# Patient Record
Sex: MALE | Race: ASIAN | NOT HISPANIC OR LATINO | URBAN - METROPOLITAN AREA
[De-identification: names, ages, dates, MRNs, and addresses within clinical notes are randomized per-mention and may not be internally consistent; named-entity substitution may affect disease eponyms.]

---

## 2019-08-16 ENCOUNTER — INPATIENT (INPATIENT)
Facility: HOSPITAL | Age: 84
LOS: 10 days | Discharge: HOME | DRG: 291 | End: 2019-08-27
Attending: INTERNAL MEDICINE | Admitting: INTERNAL MEDICINE
Payer: MEDICAID

## 2019-08-16 VITALS
OXYGEN SATURATION: 96 % | RESPIRATION RATE: 20 BRPM | SYSTOLIC BLOOD PRESSURE: 145 MMHG | DIASTOLIC BLOOD PRESSURE: 76 MMHG | HEART RATE: 90 BPM | TEMPERATURE: 98 F

## 2019-08-16 DIAGNOSIS — Z95.0 PRESENCE OF CARDIAC PACEMAKER: Chronic | ICD-10-CM

## 2019-08-16 LAB
ALBUMIN SERPL ELPH-MCNC: 3.5 G/DL — SIGNIFICANT CHANGE UP (ref 3.3–5)
ALP SERPL-CCNC: 65 U/L — SIGNIFICANT CHANGE UP (ref 40–120)
ALT FLD-CCNC: 11 U/L — SIGNIFICANT CHANGE UP (ref 10–45)
ANION GAP SERPL CALC-SCNC: 11 MMOL/L — SIGNIFICANT CHANGE UP (ref 5–17)
APTT BLD: 30.5 SEC — SIGNIFICANT CHANGE UP (ref 27.5–36.3)
AST SERPL-CCNC: 21 U/L — SIGNIFICANT CHANGE UP (ref 10–40)
BASOPHILS # BLD AUTO: 0 K/UL — SIGNIFICANT CHANGE UP (ref 0–0.2)
BASOPHILS NFR BLD AUTO: 0.6 % — SIGNIFICANT CHANGE UP (ref 0–2)
BILIRUB SERPL-MCNC: 0.3 MG/DL — SIGNIFICANT CHANGE UP (ref 0.2–1.2)
BUN SERPL-MCNC: 26 MG/DL — HIGH (ref 7–23)
CALCIUM SERPL-MCNC: 8.7 MG/DL — SIGNIFICANT CHANGE UP (ref 8.4–10.5)
CHLORIDE SERPL-SCNC: 108 MMOL/L — SIGNIFICANT CHANGE UP (ref 96–108)
CO2 SERPL-SCNC: 22 MMOL/L — SIGNIFICANT CHANGE UP (ref 22–31)
CREAT SERPL-MCNC: 1.33 MG/DL — HIGH (ref 0.5–1.3)
EOSINOPHIL # BLD AUTO: 0.2 K/UL — SIGNIFICANT CHANGE UP (ref 0–0.5)
EOSINOPHIL NFR BLD AUTO: 2.3 % — SIGNIFICANT CHANGE UP (ref 0–6)
GLUCOSE SERPL-MCNC: 120 MG/DL — HIGH (ref 70–99)
HCT VFR BLD CALC: 34.4 % — LOW (ref 39–50)
HGB BLD-MCNC: 11.2 G/DL — LOW (ref 13–17)
INR BLD: 1 RATIO — SIGNIFICANT CHANGE UP (ref 0.88–1.16)
LYMPHOCYTES # BLD AUTO: 1.6 K/UL — SIGNIFICANT CHANGE UP (ref 1–3.3)
LYMPHOCYTES # BLD AUTO: 24.7 % — SIGNIFICANT CHANGE UP (ref 13–44)
MCHC RBC-ENTMCNC: 30.5 PG — SIGNIFICANT CHANGE UP (ref 27–34)
MCHC RBC-ENTMCNC: 32.5 GM/DL — SIGNIFICANT CHANGE UP (ref 32–36)
MCV RBC AUTO: 93.8 FL — SIGNIFICANT CHANGE UP (ref 80–100)
MONOCYTES # BLD AUTO: 0.6 K/UL — SIGNIFICANT CHANGE UP (ref 0–0.9)
MONOCYTES NFR BLD AUTO: 9.8 % — SIGNIFICANT CHANGE UP (ref 2–14)
NEUTROPHILS # BLD AUTO: 4.2 K/UL — SIGNIFICANT CHANGE UP (ref 1.8–7.4)
NEUTROPHILS NFR BLD AUTO: 62.6 % — SIGNIFICANT CHANGE UP (ref 43–77)
NT-PROBNP SERPL-SCNC: HIGH PG/ML (ref 0–300)
PLATELET # BLD AUTO: 228 K/UL — SIGNIFICANT CHANGE UP (ref 150–400)
POTASSIUM SERPL-MCNC: 4.3 MMOL/L — SIGNIFICANT CHANGE UP (ref 3.5–5.3)
POTASSIUM SERPL-SCNC: 4.3 MMOL/L — SIGNIFICANT CHANGE UP (ref 3.5–5.3)
PROT SERPL-MCNC: 6.7 G/DL — SIGNIFICANT CHANGE UP (ref 6–8.3)
PROTHROM AB SERPL-ACNC: 11.5 SEC — SIGNIFICANT CHANGE UP (ref 10–12.9)
RBC # BLD: 3.67 M/UL — LOW (ref 4.2–5.8)
RBC # FLD: 13.1 % — SIGNIFICANT CHANGE UP (ref 10.3–14.5)
SODIUM SERPL-SCNC: 141 MMOL/L — SIGNIFICANT CHANGE UP (ref 135–145)
WBC # BLD: 6.6 K/UL — SIGNIFICANT CHANGE UP (ref 3.8–10.5)
WBC # FLD AUTO: 6.6 K/UL — SIGNIFICANT CHANGE UP (ref 3.8–10.5)

## 2019-08-16 PROCEDURE — 93010 ELECTROCARDIOGRAM REPORT: CPT

## 2019-08-16 PROCEDURE — 99285 EMERGENCY DEPT VISIT HI MDM: CPT

## 2019-08-16 RX ORDER — FUROSEMIDE 40 MG
20 TABLET ORAL ONCE
Refills: 0 | Status: COMPLETED | OUTPATIENT
Start: 2019-08-16 | End: 2019-08-16

## 2019-08-16 RX ADMIN — Medication 20 MILLIGRAM(S): at 23:49

## 2019-08-16 NOTE — ED ADULT TRIAGE NOTE - NS ED NOTE AC HIGH RISK COUNTRIES
Problem: ACTIVITY INTOLERANCE/IMPAIRED MOBILITY  Goal: Mobility/activity is maintained at optimum level for patient  4/8/2019 1101 by Rosa Linder RN  Outcome: Met This Shift     Problem: Falls - Risk of:  Goal: Will remain free from falls  Description  Will remain free from falls  4/8/2019 1101 by Rosa Linder RN  Outcome: Met This Shift No

## 2019-08-16 NOTE — ED ADULT NURSE NOTE - OBJECTIVE STATEMENT
patient came in with son complaining of sob especially on exertion x 2 weeks. With occassional cough felt like some mucus but not bringing up anything. Denies chest discomfort,nausea,vomiting, difficulty urinating or fever. (+) chronic BLE swelling. Also added feeling bloated & no bm for the last 2 days. Hx pacemaker placed on 2015 in [place].

## 2019-08-16 NOTE — ED ADULT NURSE NOTE - PAIN: BODY LOCATION
----- Message from Eric Prather MA sent at 6/13/2017  9:03 AM CDT -----  Contact: self - [phone]  Requesting to speak with someone this morning regarding his EKG results. Please call. Thanks!    abdomen

## 2019-08-17 DIAGNOSIS — I50.9 HEART FAILURE, UNSPECIFIED: ICD-10-CM

## 2019-08-17 DIAGNOSIS — I10 ESSENTIAL (PRIMARY) HYPERTENSION: ICD-10-CM

## 2019-08-17 DIAGNOSIS — N17.9 ACUTE KIDNEY FAILURE, UNSPECIFIED: ICD-10-CM

## 2019-08-17 DIAGNOSIS — Z85.46 PERSONAL HISTORY OF MALIGNANT NEOPLASM OF PROSTATE: ICD-10-CM

## 2019-08-17 DIAGNOSIS — Z29.9 ENCOUNTER FOR PROPHYLACTIC MEASURES, UNSPECIFIED: ICD-10-CM

## 2019-08-17 DIAGNOSIS — R09.89 OTHER SPECIFIED SYMPTOMS AND SIGNS INVOLVING THE CIRCULATORY AND RESPIRATORY SYSTEMS: ICD-10-CM

## 2019-08-17 LAB
ANION GAP SERPL CALC-SCNC: 13 MMOL/L — SIGNIFICANT CHANGE UP (ref 5–17)
APPEARANCE UR: CLEAR — SIGNIFICANT CHANGE UP
BILIRUB UR-MCNC: NEGATIVE — SIGNIFICANT CHANGE UP
BUN SERPL-MCNC: 24 MG/DL — HIGH (ref 7–23)
CALCIUM SERPL-MCNC: 8.8 MG/DL — SIGNIFICANT CHANGE UP (ref 8.4–10.5)
CHLORIDE SERPL-SCNC: 109 MMOL/L — HIGH (ref 96–108)
CO2 SERPL-SCNC: 21 MMOL/L — LOW (ref 22–31)
COLOR SPEC: SIGNIFICANT CHANGE UP
CREAT SERPL-MCNC: 1.27 MG/DL — SIGNIFICANT CHANGE UP (ref 0.5–1.3)
DIFF PNL FLD: SIGNIFICANT CHANGE UP
FERRITIN SERPL-MCNC: 99 NG/ML — SIGNIFICANT CHANGE UP (ref 30–400)
GLUCOSE SERPL-MCNC: 116 MG/DL — HIGH (ref 70–99)
GLUCOSE UR QL: NEGATIVE — SIGNIFICANT CHANGE UP
HCT VFR BLD CALC: 37 % — LOW (ref 39–50)
HGB BLD-MCNC: 11.7 G/DL — LOW (ref 13–17)
IRON SATN MFR SERPL: 15 % — LOW (ref 16–55)
IRON SATN MFR SERPL: 40 UG/DL — LOW (ref 45–165)
KETONES UR-MCNC: NEGATIVE — SIGNIFICANT CHANGE UP
LEUKOCYTE ESTERASE UR-ACNC: NEGATIVE — SIGNIFICANT CHANGE UP
MAGNESIUM SERPL-MCNC: 2.1 MG/DL — SIGNIFICANT CHANGE UP (ref 1.6–2.6)
MCHC RBC-ENTMCNC: 30.1 PG — SIGNIFICANT CHANGE UP (ref 27–34)
MCHC RBC-ENTMCNC: 31.6 GM/DL — LOW (ref 32–36)
MCV RBC AUTO: 95.1 FL — SIGNIFICANT CHANGE UP (ref 80–100)
NITRITE UR-MCNC: NEGATIVE — SIGNIFICANT CHANGE UP
PH UR: 6 — SIGNIFICANT CHANGE UP (ref 5–8)
PHOSPHATE SERPL-MCNC: 3.7 MG/DL — SIGNIFICANT CHANGE UP (ref 2.5–4.5)
PLATELET # BLD AUTO: 239 K/UL — SIGNIFICANT CHANGE UP (ref 150–400)
POTASSIUM SERPL-MCNC: 3.8 MMOL/L — SIGNIFICANT CHANGE UP (ref 3.5–5.3)
POTASSIUM SERPL-SCNC: 3.8 MMOL/L — SIGNIFICANT CHANGE UP (ref 3.5–5.3)
PROT UR-MCNC: ABNORMAL
RBC # BLD: 3.89 M/UL — LOW (ref 4.2–5.8)
RBC # BLD: 3.98 M/UL — LOW (ref 4.2–5.8)
RBC # FLD: 14 % — SIGNIFICANT CHANGE UP (ref 10.3–14.5)
RETICS #: 95.1 K/UL — SIGNIFICANT CHANGE UP (ref 25–125)
RETICS/RBC NFR: 2.4 % — SIGNIFICANT CHANGE UP (ref 0.5–2.5)
SODIUM SERPL-SCNC: 143 MMOL/L — SIGNIFICANT CHANGE UP (ref 135–145)
SP GR SPEC: 1.01 — SIGNIFICANT CHANGE UP (ref 1.01–1.02)
TIBC SERPL-MCNC: 274 UG/DL — SIGNIFICANT CHANGE UP (ref 220–430)
TROPONIN T, HIGH SENSITIVITY RESULT: 87 NG/L — HIGH (ref 0–51)
TROPONIN T, HIGH SENSITIVITY RESULT: 88 NG/L — HIGH (ref 0–51)
UIBC SERPL-MCNC: 234 UG/DL — SIGNIFICANT CHANGE UP (ref 110–370)
UROBILINOGEN FLD QL: SIGNIFICANT CHANGE UP
WBC # BLD: 7.1 K/UL — SIGNIFICANT CHANGE UP (ref 3.8–10.5)
WBC # FLD AUTO: 7.1 K/UL — SIGNIFICANT CHANGE UP (ref 3.8–10.5)

## 2019-08-17 PROCEDURE — 71046 X-RAY EXAM CHEST 2 VIEWS: CPT | Mod: 26

## 2019-08-17 PROCEDURE — 99223 1ST HOSP IP/OBS HIGH 75: CPT

## 2019-08-17 RX ORDER — FUROSEMIDE 40 MG
40 TABLET ORAL EVERY 8 HOURS
Refills: 0 | Status: DISCONTINUED | OUTPATIENT
Start: 2019-08-17 | End: 2019-08-18

## 2019-08-17 RX ADMIN — Medication 40 MILLIGRAM(S): at 21:38

## 2019-08-17 RX ADMIN — Medication 40 MILLIGRAM(S): at 07:58

## 2019-08-17 RX ADMIN — Medication 200 MILLIGRAM(S): at 23:17

## 2019-08-17 RX ADMIN — Medication 40 MILLIGRAM(S): at 13:00

## 2019-08-17 NOTE — PROVIDER CONTACT NOTE (CRITICAL VALUE NOTIFICATION) - BACKGROUND
89 y/o M with PMH CHF, HTN presents to the ED with c/o SOB and productive cough with white sputum onset 4 day ago

## 2019-08-17 NOTE — H&P ADULT - PROBLEM SELECTOR PLAN 4
Patient states that he had previously been treated and had downtrending PSA.  Patient should have regular follow up with his urologist upon discharge from the hospital.

## 2019-08-17 NOTE — H&P ADULT - NSHPLABSRESULTS_GEN_ALL_CORE
Labs, imaging and EKG personally reviewed by me.     LABS:                        11.2   6.6   )-----------( 228      ( 16 Aug 2019 23:07 )             34.4     Hgb Trend: 11.2<--  08-16    141  |  108  |  26<H>  ----------------------------<  120<H>  4.3   |  22  |  1.33<H>    Ca    8.7      16 Aug 2019 23:07    TPro  6.7  /  Alb  3.5  /  TBili  0.3  /  DBili  x   /  AST  21  /  ALT  11  /  AlkPhos  65  08-16    Creatinine Trend: 1.33<--  PT/INR - ( 16 Aug 2019 23:07 )   PT: 11.5 sec;   INR: 1.00 ratio         PTT - ( 16 Aug 2019 23:07 )  PTT:30.5 sec Labs, imaging and EKG personally reviewed by me.     EKG a sensed, v paced HR 70s    LABS:                        11.2   6.6   )-----------( 228      ( 16 Aug 2019 23:07 )             34.4     Hgb Trend: 11.2<--  08-16    141  |  108  |  26<H>  ----------------------------<  120<H>  4.3   |  22  |  1.33<H>    Ca    8.7      16 Aug 2019 23:07    TPro  6.7  /  Alb  3.5  /  TBili  0.3  /  DBili  x   /  AST  21  /  ALT  11  /  AlkPhos  65  08-16    Creatinine Trend: 1.33<--  PT/INR - ( 16 Aug 2019 23:07 )   PT: 11.5 sec;   INR: 1.00 ratio         PTT - ( 16 Aug 2019 23:07 )  PTT:30.5 sec    < from: Xray Chest 2 Views PA/Lat (08.17.19 @ 00:35) >      PROCEDURE DATE:  08/17/2019      ******PRELIMINARY REPORT******    ******PRELIMINARY REPORT******          INTERPRETATION:  Cardiomegaly with pulmonary edema and bilateral pleural   effusions.      ******PRELIMINARY REPORT******      < end of copied text >

## 2019-08-17 NOTE — H&P ADULT - HISTORY OF PRESENT ILLNESS
This patient is a 89yoM with PMH of htn, prostate cancer s/p treatment, ppm (indication unknown) who presents to the ED with complaint of dyspnea. He endorses having worsening dyspnea with exertion over the last 2 weeks, and lower extremity edema progressing to the knees bilaterally. He also complains of persistent dry cough. He denies any fever, chills, dizziness, CP or palpitations. The patient is hard of hearing.      In the ED, T 97.6F, HR 90, /76, RR 20, SpO2 97.6F  Patient was given lasix 20mg IVP. This patient is a 89yoM with PMH of htn, prostate cancer s/p treatment, ppm (indication unknown) who presents to the ED with complaint of dyspnea. He endorses having worsening dyspnea with exertion over the last 2 weeks, and lower extremity edema progressing to the knees bilaterally. He also complains of persistent dry cough. He denies any fever, chills, dizziness, CP or palpitations. He denies orthopnea. He has no history of MI, renal or hepatic dysfunction.   He is visiting from [place] and does not have a physician in the [place].   The patient is hard of hearing but reads English well.       In the ED, T 97.6F, HR 90, /76, RR 20, SpO2 97.6F  Patient was given lasix 20mg IVP.

## 2019-08-17 NOTE — H&P ADULT - NSHPPHYSICALEXAM_GEN_ALL_CORE
Vital Signs Last 24 Hrs  T(C): 36.3 (17 Aug 2019 02:18), Max: 36.6 (16 Aug 2019 22:39)  T(F): 97.4 (17 Aug 2019 02:18), Max: 97.9 (16 Aug 2019 22:39)  HR: 90 (17 Aug 2019 02:18) (71 - 90)  BP: 153/81 (17 Aug 2019 02:18) (137/89 - 158/90)  BP(mean): --  RR: 18 (17 Aug 2019 02:18) (18 - 20)  SpO2: 94% (17 Aug 2019 02:18) (94% - 96%)    PHYSICAL EXAM:  GENERAL: NAD, well-groomed, well-developed  HEAD:  Atraumatic, Normocephalic  EYES: EOMI, PERRLA, conjunctiva and sclera clear  ENMT: No oropharyngeal exudates, erythema or lesions,  Moist mucous membranes, hard of hearing  NECK: Supple, no cervical lymphadenopathy, no visible JVD  NERVOUS SYSTEM:  Alert & Oriented X3, 5/5 BUE and BLE motor strength, full sensation to light touch   CHEST/LUNG: bilateral crackles in all lung fields, no rhonchi  HEART: Regular rate and rhythm; No murmurs, rubs, or gallops  ppm visible at L chest wall  ABDOMEN: Soft, Nontender, Nondistended  EXTREMITIES:  2+ radial pulses, No clubbing, no cyanosis, 2+ pitting edema to knees bilaterally  MSK: lower back tenderness to palpation  SKIN: No rashes or lesions

## 2019-08-17 NOTE — PATIENT PROFILE ADULT - PSYCHOSOCIAL CONCERNS
----- Message from Kofi Renee MD sent at 3/31/2017  1:15 PM CDT -----  Amoxicillin 500 mg t.i.d. for 14 days, start 10 days prior to procedure  
Alison notified of ua results, RX will be started.  
none

## 2019-08-17 NOTE — H&P ADULT - PROBLEM SELECTOR PLAN 1
Patient has TRUONG, crackles bilaterally, with CXR of pulmonary edema, pleural effusions and elevated proBNP indicative of heart failure exacerbation.   Monitor on telemetry  Check transthoracic echocardiogram.  Start lasix 40mg IVP q8h. Monitor strict I&Os and daily weights. Check BMP and replete electrolytes q12.  Monitor renal function.   Obtain additional documents regarding patient's cardiac history. It is unclear if heart failure is chronic or acute. Patient denies history of MI. Patient may require inpatient ischemic workup if this had not been performed previously.  Repeat troponin x1. Patient has TRUONG, crackles bilaterally, with CXR of pulmonary edema, pleural effusions and elevated proBNP indicative of heart failure exacerbation.   Monitor on telemetry  Check transthoracic echocardiogram.  Start lasix 40mg IVP q8h. Monitor strict I&Os and daily weights. Check BMP and replete electrolytes q12.  Monitor renal function.   Obtain additional documents regarding patient's cardiac history. It is unclear if heart failure is chronic or acute. Patient denies history of MI. Patient may require inpatient ischemic workup if this had not been performed previously.  Repeat troponin x1.   Follow up final CXR read Patient has TRUONG, crackles bilaterally, with CXR of pulmonary edema, pleural effusions and elevated proBNP indicative of heart failure exacerbation.   Monitor on telemetry  Check transthoracic echocardiogram.  Start lasix 40mg IVP q8h. Monitor strict I&Os and daily weights. Check BMP and replete electrolytes q12.  Monitor renal function.   Obtain additional documents regarding patient's cardiac history. It is unclear if heart failure is chronic or acute. Patient denies history of MI. Patient may require inpatient ischemic workup if this had not been performed previously.  Check troponin    Follow up final CXR read

## 2019-08-17 NOTE — H&P ADULT - ASSESSMENT
This patient is a 89yoM with PMH of htn, prostate cancer s/p treatment, ppm (indication unknown) who presents to the ED with complaint of dyspnea. He endorses having worsening dyspnea with exertion over the last 2 weeks, and lower extremity edema progressing to the knees bilaterally. He also complains of persistent dry cough. He denies any fever, chills, dizziness, CP or palpitations. The patient is hard of hearing.      In the ED, T 97.6F, HR 90, /76, RR 20, SpO2 97.6F  Patient was given lasix 20mg IVP. This patient is a 89yoM with PMH of htn, prostate cancer s/p treatment, ppm (indication unknown) who presents to the ED with complaint of dyspnea and BLE edema 2/2 suspected heart failure exacerbation

## 2019-08-17 NOTE — ED PROVIDER NOTE - CLINICAL SUMMARY MEDICAL DECISION MAKING FREE TEXT BOX
89 y/o M with PMH HTN and CHF presents to the ED with c/o SOB, orthopnea and increased b/l leg swelling for the past 4 days. Also c/o cough. No fevers. Labs, BNP, CXR. 89 y/o M with PMH HTN and CHF presents to the ED with c/o SOB, orthopnea and increased b/l leg swelling for the past 4 days. Also c/o cough. No fevers. Labs, BNP, CXR, lasix. Patient 89 y/o M with PMH HTN and CHF presents to the ED with c/o SOB, orthopnea and increased b/l leg swelling for the past 4 days. Also c/o cough. No fevers. DDx include CHF exacerbation (most likely), pneumonia, PNA UTI. Labs, BNP, CXR, lasix.

## 2019-08-17 NOTE — ED PROVIDER NOTE - PHYSICAL EXAMINATION
Gen: WDWN, NAD  HEENT: EOMI, no nasal discharge, mucous membranes moist  CV: RRR, +S1/S2, no M/R/G  Resp: CTAB, +crackles in LLL, no wheezing  GI: Abdomen soft non-distended, NTTP, no masses  MSK: No open wounds, no bruising, 2+pitting edema to bilateral LEs to upper 1/3 shin  Neuro: A&Ox4, following commands, moving all four extremities spontaneously  Psych: appropriate mood, denies AH, VH, SI

## 2019-08-17 NOTE — H&P ADULT - NSHPREVIEWOFSYSTEMS_GEN_ALL_CORE
REVIEW OF SYSTEMS  CONSTITUTIONAL: No fever, no chills, no fatigue  EYES: No eye pain, no vision changes  ENMT:  No difficulty hearing, no throat pain  RESPIRATORY: + cough, no wheezing, no sputum production; + shortness of breath  CARDIOVASCULAR: No chest pain, no palpitations, + TRUONG, + leg swelling  GASTROINTESTINAL: No abdominal pain, no nausea, no vomiting, no diarrhea, no constipation  GENITOURINARY: No dysuria, no hematuria  NEUROLOGICAL: No headaches, no loss of strength, no numbness  SKIN: No itching, no rashes, no lesions   MUSCULOSKELETAL: No joint pain, no joint swelling; No muscle pain  HEME/LYMPH: No easy bruising, bleeding

## 2019-08-17 NOTE — H&P ADULT - PROBLEM SELECTOR PLAN 2
Patient noted to have elevated SCr, potentially  related to renal vascular congestion.  Continue diuresis as noted above.  Monitor SCr. Patient noted to have elevated SCr, potentially  related to renal vascular congestion.  Continue diuresis as noted above.  Monitor SCr.  When renal function normalizes, pt would  benefit from being started on an ACEI.

## 2019-08-17 NOTE — H&P ADULT - PROBLEM SELECTOR PLAN 3
The patient's BP is currently elevated.  Will continue lasix for diuresis.  Patient is on s-numlo at home, which appears to be amlodipine. Will hold for now.

## 2019-08-17 NOTE — ED PROVIDER NOTE - OBJECTIVE STATEMENT
89 y/o M with PMH CHF, HTN presents to the ED with c/o SOB and productive cough with white sputum onset 4 day ago. Patient states SOB worse with walking and laying flat. No fevers. Also notes 2 days constipation and some abdominal distension. Denies CP, palpitations, n/v/d. Patient took lasix 8 months ago 20mg, but has not had any in 8 months.

## 2019-08-18 LAB
ANION GAP SERPL CALC-SCNC: 12 MMOL/L — SIGNIFICANT CHANGE UP (ref 5–17)
BUN SERPL-MCNC: 24 MG/DL — HIGH (ref 7–23)
CALCIUM SERPL-MCNC: 8.7 MG/DL — SIGNIFICANT CHANGE UP (ref 8.4–10.5)
CHLORIDE SERPL-SCNC: 104 MMOL/L — SIGNIFICANT CHANGE UP (ref 96–108)
CO2 SERPL-SCNC: 26 MMOL/L — SIGNIFICANT CHANGE UP (ref 22–31)
CREAT SERPL-MCNC: 1.34 MG/DL — HIGH (ref 0.5–1.3)
GLUCOSE SERPL-MCNC: 104 MG/DL — HIGH (ref 70–99)
POTASSIUM SERPL-MCNC: 3.8 MMOL/L — SIGNIFICANT CHANGE UP (ref 3.5–5.3)
POTASSIUM SERPL-SCNC: 3.8 MMOL/L — SIGNIFICANT CHANGE UP (ref 3.5–5.3)
SODIUM SERPL-SCNC: 142 MMOL/L — SIGNIFICANT CHANGE UP (ref 135–145)

## 2019-08-18 RX ORDER — LANOLIN ALCOHOL/MO/W.PET/CERES
3 CREAM (GRAM) TOPICAL AT BEDTIME
Refills: 0 | Status: DISCONTINUED | OUTPATIENT
Start: 2019-08-18 | End: 2019-08-27

## 2019-08-18 RX ORDER — FUROSEMIDE 40 MG
40 TABLET ORAL
Refills: 0 | Status: DISCONTINUED | OUTPATIENT
Start: 2019-08-18 | End: 2019-08-22

## 2019-08-18 RX ADMIN — Medication 3 MILLIGRAM(S): at 21:49

## 2019-08-18 RX ADMIN — Medication 40 MILLIGRAM(S): at 05:17

## 2019-08-18 RX ADMIN — Medication 40 MILLIGRAM(S): at 17:00

## 2019-08-18 NOTE — PROVIDER CONTACT NOTE (MEDICATION) - ASSESSMENT
Pt A&OX3, forgetful at times.  Pt has a infrequent, nonproductive cough preventing him to fall sleep.

## 2019-08-18 NOTE — PROGRESS NOTE ADULT - SUBJECTIVE AND OBJECTIVE BOX
Patient is a 89y old  Male who presents with a chief complaint of dyspnea (17 Aug 2019 04:41)      SUBJECTIVE / OVERNIGHT EVENTS:    Events noted.  CONSTITUTIONAL: Weakness  No N/V/Abd pain  On supp O2    MEDICATIONS  (STANDING):  furosemide   Injectable 40 milliGRAM(s) IV Push two times a day    MEDICATIONS  (PRN):  melatonin 3 milliGRAM(s) Oral at bedtime PRN Insomnia        CAPILLARY BLOOD GLUCOSE        I&O's Summary    17 Aug 2019 07:  -  18 Aug 2019 07:00  --------------------------------------------------------  IN: 1205 mL / OUT: 675 mL / NET: 530 mL    18 Aug 2019 07:01  -  18 Aug 2019 22:50  --------------------------------------------------------  IN: 1200 mL / OUT: 1425 mL / NET: -225 mL        PHYSICAL EXAM:    NECK: Supple, No JVD  CHEST/LUNG: Clear to auscultation bilaterally; Bl basilar crackles  HEART: Regular rate and rhythm; No murmurs, rubs, or gallops  ABDOMEN: Soft, Nontender, Nondistended; Bowel sounds present  EXTREMITIES:   Pedal edema  NEUROLOGY: AAO       LABS:                        11.7   7.10  )-----------( 239      ( 17 Aug 2019 12:53 )             37.0         142  |  104  |  24<H>  ----------------------------<  104<H>  3.8   |  26  |  1.34<H>    Ca    8.7      18 Aug 2019 05:51  Phos  3.7       Mg     2.1         TPro  6.7  /  Alb  3.5  /  TBili  0.3  /  DBili  x   /  AST  21  /  ALT  11  /  AlkPhos  65  08-16    PT/INR - ( 16 Aug 2019 23:07 )   PT: 11.5 sec;   INR: 1.00 ratio         PTT - ( 16 Aug 2019 23:07 )  PTT:30.5 sec      Urinalysis Basic - ( 17 Aug 2019 12:12 )    Color: Light Yellow / Appearance: Clear / S.010 / pH: x  Gluc: x / Ketone: Negative  / Bili: Negative / Urobili: <2 mg/dL   Blood: x / Protein: 100 mg/dL / Nitrite: Negative   Leuk Esterase: Negative / RBC: 2 /HPF / WBC 1 /HPF   Sq Epi: x / Non Sq Epi: 0 /HPF / Bacteria: Negative      CAPILLARY BLOOD GLUCOSE                    RADIOLOGY & ADDITIONAL TESTS:    Imaging Personally Reviewed:    Consultant(s) Notes Reviewed:      Care Discussed with Consultants/Other Providers:

## 2019-08-18 NOTE — PROVIDER CONTACT NOTE (MEDICATION) - SITUATION
Pt complaining about anxiety and difficulty sleeping, says he takes daxid 25 mg at home for anxiety and lexapro.

## 2019-08-18 NOTE — PROVIDER CONTACT NOTE (MEDICATION) - ASSESSMENT
Pt A&OX4, V/S stable see flowsheet.  Pt complaining of anxiety and difficulty sleeping and requesting his home meds for anxiety.

## 2019-08-18 NOTE — PROVIDER CONTACT NOTE (MEDICATION) - ACTION/TREATMENT ORDERED:
WALLY Robb ordered melatonin prn for sleep, unable to prescribe med because of neg side effect on heart. Will continue to monitor.  Pt will bring home meds for anxiety tmr to discuss with AM team.

## 2019-08-18 NOTE — PROVIDER CONTACT NOTE (MEDICATION) - BACKGROUND
Pt admitted for CHF exacerbation on Lasix IV and LUZ.  PMH of HTN, BPH, prostate cancer, LUZ, HF and history of pacemaker.

## 2019-08-19 LAB
ANION GAP SERPL CALC-SCNC: 11 MMOL/L — SIGNIFICANT CHANGE UP (ref 5–17)
BUN SERPL-MCNC: 21 MG/DL — SIGNIFICANT CHANGE UP (ref 7–23)
CALCIUM SERPL-MCNC: 9 MG/DL — SIGNIFICANT CHANGE UP (ref 8.4–10.5)
CHLORIDE SERPL-SCNC: 98 MMOL/L — SIGNIFICANT CHANGE UP (ref 96–108)
CO2 SERPL-SCNC: 28 MMOL/L — SIGNIFICANT CHANGE UP (ref 22–31)
CREAT SERPL-MCNC: 1.31 MG/DL — HIGH (ref 0.5–1.3)
GLUCOSE SERPL-MCNC: 105 MG/DL — HIGH (ref 70–99)
HCT VFR BLD CALC: 38.4 % — LOW (ref 39–50)
HGB BLD-MCNC: 12.3 G/DL — LOW (ref 13–17)
MCHC RBC-ENTMCNC: 29.7 PG — SIGNIFICANT CHANGE UP (ref 27–34)
MCHC RBC-ENTMCNC: 32 GM/DL — SIGNIFICANT CHANGE UP (ref 32–36)
MCV RBC AUTO: 92.8 FL — SIGNIFICANT CHANGE UP (ref 80–100)
PLATELET # BLD AUTO: 242 K/UL — SIGNIFICANT CHANGE UP (ref 150–400)
POTASSIUM SERPL-MCNC: 3.5 MMOL/L — SIGNIFICANT CHANGE UP (ref 3.5–5.3)
POTASSIUM SERPL-SCNC: 3.5 MMOL/L — SIGNIFICANT CHANGE UP (ref 3.5–5.3)
RBC # BLD: 4.14 M/UL — LOW (ref 4.2–5.8)
RBC # FLD: 13.7 % — SIGNIFICANT CHANGE UP (ref 10.3–14.5)
SODIUM SERPL-SCNC: 137 MMOL/L — SIGNIFICANT CHANGE UP (ref 135–145)
WBC # BLD: 6.24 K/UL — SIGNIFICANT CHANGE UP (ref 3.8–10.5)
WBC # FLD AUTO: 6.24 K/UL — SIGNIFICANT CHANGE UP (ref 3.8–10.5)

## 2019-08-19 PROCEDURE — 93306 TTE W/DOPPLER COMPLETE: CPT | Mod: 26

## 2019-08-19 RX ADMIN — Medication 40 MILLIGRAM(S): at 17:46

## 2019-08-19 RX ADMIN — Medication 40 MILLIGRAM(S): at 05:01

## 2019-08-20 LAB
ANION GAP SERPL CALC-SCNC: 10 MMOL/L — SIGNIFICANT CHANGE UP (ref 5–17)
BUN SERPL-MCNC: 18 MG/DL — SIGNIFICANT CHANGE UP (ref 7–23)
CALCIUM SERPL-MCNC: 8.7 MG/DL — SIGNIFICANT CHANGE UP (ref 8.4–10.5)
CHLORIDE SERPL-SCNC: 94 MMOL/L — LOW (ref 96–108)
CO2 SERPL-SCNC: 31 MMOL/L — SIGNIFICANT CHANGE UP (ref 22–31)
CREAT SERPL-MCNC: 1.14 MG/DL — SIGNIFICANT CHANGE UP (ref 0.5–1.3)
GLUCOSE SERPL-MCNC: 100 MG/DL — HIGH (ref 70–99)
HCT VFR BLD CALC: 40.4 % — SIGNIFICANT CHANGE UP (ref 39–50)
HGB BLD-MCNC: 13.3 G/DL — SIGNIFICANT CHANGE UP (ref 13–17)
MCHC RBC-ENTMCNC: 30.1 PG — SIGNIFICANT CHANGE UP (ref 27–34)
MCHC RBC-ENTMCNC: 32.9 GM/DL — SIGNIFICANT CHANGE UP (ref 32–36)
MCV RBC AUTO: 91.4 FL — SIGNIFICANT CHANGE UP (ref 80–100)
PLATELET # BLD AUTO: 258 K/UL — SIGNIFICANT CHANGE UP (ref 150–400)
POTASSIUM SERPL-MCNC: 3.8 MMOL/L — SIGNIFICANT CHANGE UP (ref 3.5–5.3)
POTASSIUM SERPL-SCNC: 3.8 MMOL/L — SIGNIFICANT CHANGE UP (ref 3.5–5.3)
RBC # BLD: 4.42 M/UL — SIGNIFICANT CHANGE UP (ref 4.2–5.8)
RBC # FLD: 13.6 % — SIGNIFICANT CHANGE UP (ref 10.3–14.5)
SODIUM SERPL-SCNC: 135 MMOL/L — SIGNIFICANT CHANGE UP (ref 135–145)
WBC # BLD: 6.73 K/UL — SIGNIFICANT CHANGE UP (ref 3.8–10.5)
WBC # FLD AUTO: 6.73 K/UL — SIGNIFICANT CHANGE UP (ref 3.8–10.5)

## 2019-08-20 PROCEDURE — 99255 IP/OBS CONSLTJ NEW/EST HI 80: CPT | Mod: GC

## 2019-08-20 RX ORDER — ACETAMINOPHEN 500 MG
650 TABLET ORAL ONCE
Refills: 0 | Status: COMPLETED | OUTPATIENT
Start: 2019-08-20 | End: 2019-08-20

## 2019-08-20 RX ADMIN — Medication 650 MILLIGRAM(S): at 12:30

## 2019-08-20 RX ADMIN — Medication 40 MILLIGRAM(S): at 17:41

## 2019-08-20 RX ADMIN — Medication 650 MILLIGRAM(S): at 11:31

## 2019-08-20 RX ADMIN — Medication 40 MILLIGRAM(S): at 06:05

## 2019-08-20 NOTE — SWALLOW BEDSIDE ASSESSMENT ADULT - SWALLOW EVAL: DIAGNOSIS
Pt presents with a grossly functional oropharyngeal swallow. Some confusion, abnormal behaviors noted initially during cup sips (pouring water into open oral cavity), however responded to min cues to take small single cup sips. Otherwise, adequate oral prep, transit and clearance across consistencies. No overt signs/symptoms of laryngeal penetration/aspiration noted across consistencies trialed.

## 2019-08-20 NOTE — CONSULT NOTE ADULT - ASSESSMENT
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 Cardiology consulted for management of pacemaker lead thrombus.     !!! INCOMPLETE NOTE PENDING DISCUSSION WITH ATTENDING !!!    #Pacemaker lead thrombus  - Low risk for significant pulmonary embolis  - Recommend starting warfarin for 3 months  - Repeat TTE at 6 weeks to evaluate for resolution/regression of thrombus    Mark Hellerman PGY2 89 yo man (visiting from [place], speaks [place]) w/ hx of htn, prostate cancer s/p treatment, who presented with 2 weeks of dyspnea associated with progressive LLE, admitted for HF exacerbation, TTE w/ concern for lead thrombus. Cardiology consulted for management of possible pacemaker lead thrombus.     #Pacemaker lead thrombus  - Etiology of lead finding unclear. Low concern for endocarditis give absence of fever and leukocytosis.   - Management of implanted cardiac lead thrombi remains undefined however some studies have found >80% thrombus resolution after 6 week trial of Warfarin (PMID [id number])  - Recommend 6 week trial of Warfarin (Goal INR 2-3) w/ repeat TTE after 6 weeks to evaluate for resolution of thrombus    #Heart failure  - Recommend continuing diuresis  - Recommend initiating guideline directed medical therapy starting with carvedilol ER 20mg daily     Mark Hellerman, PGY2 89 yo man (visiting from [place], speaks [place]) w/ hx of htn, prostate cancer s/p treatment, who presented with 2 weeks of dyspnea associated with progressive LLE, admitted for HF exacerbation, TTE w/ concern for lead thrombus. Cardiology consulted for management of possible pacemaker lead thrombus.     #Pacemaker lead thrombus  - Etiology of lead finding unclear. Low concern for endocarditis give absence of fever and leukocytosis.   - Management of implanted cardiac lead thrombi remains undefined however some studies have found >80% thrombus resolution after 6 week trial of Warfarin (PMID [id number])  - Recommend 6 week trial of Warfarin (Goal INR 2-3) w/ repeat TTE after 6 weeks to evaluate for resolution of thrombus    #Heart failure  - Recommend continuing diuresis  - Recommend initiating guideline directed medical therapy starting with carvedilol ER 10mg daily     Mark Hellerman, PGY2

## 2019-08-20 NOTE — CONSULT NOTE ADULT - SUBJECTIVE AND OBJECTIVE BOX
Patient seen and evaluated at bedside    Chief Complaint:    HPI:  This patient is a 89yoM with PMH of htn, prostate cancer s/p treatment, ppm (indication unknown) who presents to the ED with complaint of dyspnea. He endorses having worsening dyspnea with exertion over the last 2 weeks, and lower extremity edema progressing to the knees bilaterally. He also complains of persistent dry cough. He denies any fever, chills, dizziness, CP or palpitations. He denies orthopnea. He has no history of MI, renal or hepatic dysfunction.   He is visiting from [facility] and does not have a physician in the [place].   The patient is hard of hearing but reads English well.       In the ED, T 97.6F, HR 90, /76, RR 20, SpO2 97.6F  Patient was given lasix 20mg IVP. (17 Aug 2019 04:41)      PMH:   BPH (benign prostatic hyperplasia)  Hypertension      PSH:   History of pacemaker      Medications:   furosemide   Injectable 40 milliGRAM(s) IV Push two times a day  melatonin 3 milliGRAM(s) Oral at bedtime PRN      Allergies:  No Known Allergies      FAMILY HISTORY:      Social History:  Smoking History:  Alcohol Use:  Drug Use:    Review of Systems:  REVIEW OF SYSTEMS:  CONSTITUTIONAL: No weakness, fevers or chills  EYES/ENT: No visual changes;  No dysphagia  NECK: No pain or stiffness  RESPIRATORY: No cough, wheezing, hemoptysis; No shortness of breath  CARDIOVASCULAR: No chest pain or palpitations; No lower extremity edema  GASTROINTESTINAL: No abdominal or epigastric pain. No nausea, vomiting, or hematemesis; No diarrhea or constipation. No melena or hematochezia.  BACK: No back pain  GENITOURINARY: No dysuria, frequency or hematuria  NEUROLOGICAL: No numbness or weakness  SKIN: No itching, burning, rashes, or lesions   All other review of systems is negative unless indicated above.    Physical Exam:  T(F): 97.8 (08-20), Max: 98.8 (08-20)  HR: 64 (08-20) (64 - 85)  BP: 114/61 (08-20) (114/61 - 154/79)  RR: 18 (08-20)  SpO2: 98% (08-20)  GENERAL: No acute distress, well-developed  HEAD:  Atraumatic, Normocephalic  ENT: EOMI, PERRLA, conjunctiva and sclera clear, Neck supple, No JVD, moist mucosa  CHEST/LUNG: Clear to auscultation bilaterally; No wheeze, equal breath sounds bilaterally   BACK: No spinal tenderness  HEART: Regular rate and rhythm; No murmurs, rubs, or gallops  ABDOMEN: Soft, Nontender, Nondistended; Bowel sounds present  EXTREMITIES:  No clubbing, cyanosis, or edema  PSYCH: Nl behavior, nl affect  NEUROLOGY: AAOx3, non-focal, cranial nerves intact  SKIN: Normal color, No rashes or lesions  LINES:    Cardiovascular Diagnostic Testing:    ECG: Personally reviewed    Echo:    Stress Testing:    Cath:    Interpretation of Telemetry:    Imaging:    Labs: Personally reviewed                        13.3   6.73  )-----------( 258      ( 20 Aug 2019 08:14 )             40.4     08-20    135  |  94<L>  |  18  ----------------------------<  100<H>  3.8   |  31  |  1.14    Ca    8.7      20 Aug 2019 06:32            Serum Pro-Brain Natriuretic Peptide: 20464 pg/mL (08-16 @ 23:07) Patient seen and evaluated at bedside    Chief Complaint:    Hx obtained from chart, [facility]  unable to connect a Kannada-speaking , unable to reach pt's son.      HPI:  89 yo man (visiting from [place], speaks Kannada) w/ hx of htn, prostate cancer s/p treatment, who presented with 2 weeks of dyspnea associated with progressive LLE, admitted for HF exacerbation. During this hospital course pt has been managed with IV furosemide 40 mg q8h.  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  Pt denies chest pain, palpitations orthopnea or hx of MI.  Pt is visiting family from [facility] and is hard of hearing.  Cardiology consulted for management of pacemaker lead thrombus       PMH:   BPH (benign prostatic hyperplasia)  Hypertension      PSH:   History of pacemaker      Medications:   furosemide   Injectable 40 milliGRAM(s) IV Push two times a day  melatonin 3 milliGRAM(s) Oral at bedtime PRN      Allergies:  No Known Allergies      FAMILY HISTORY:  Unkown     Social History:  The patient lives with his son.  He denies tobacco or alcohol use.    Review of Systems:  REVIEW OF SYSTEMS:  Limited ROS due to language barrier and inability to obtain   Pt states "no" when asked about chest pain but "yes" to trouble breathing    Physical Exam:  T(F): 97.8 (08-20), Max: 98.8 (08-20)  HR: 64 (08-20) (64 - 85)  BP: 114/61 (08-20) (114/61 - 154/79)  RR: 18 (08-20)  SpO2: 98% (08-20)    GENERAL: No acute distress, well-developed  HEAD:  Atraumatic, Normocephalic  ENT: EOMI, PERRLA, conjunctiva and sclera clear, Neck supple, No JVD, moist mucosa  CHEST/LUNG: Clear to auscultation bilaterally; No wheeze, equal breath sounds bilaterally   BACK: No spinal tenderness  HEART: Regular rate and rhythm; No murmurs, rubs, or gallops  ABDOMEN: Soft, Nontender, Nondistended; Bowel sounds present  EXTREMITIES:  No clubbing, cyanosis, or edema  PSYCH: Nl behavior, nl affect  NEUROLOGY: AAOx3, non-focal, cranial nerves intact  SKIN: Normal color, No rashes or lesions  LINES:    Cardiovascular Diagnostic Testing:    ECG: Personally reviewed, atrially sensed, ventricularly paced rhythm at 68 bpm    Echo:  Patient name: BECK BANEGAS  YOB: 1928   Age: 89 (M)   MR#: [id number]  Study Date: 8/19/2019  [facility]  Study quality: Technically fair  Referring Physician: Bolivar Solomon MD  Blood Pressure: 124/51 mmHg  Height: 170 cm  Weight: 70 kg  BSA: 1.8 m2  ------------------------------------------------------------------------  PROCEDURE: Transthoracic echocardiogram with 2-D, M-Mode  and complete spectral and color flow Doppler. Verbal  consent was obtained for injection of  Ultrasonic Enhancing  Agent following a discussion of risks and benefits.  Following intravenous injection of Ultrasonic Enhancing  Agent , harmonic imaging was performed.  INDICATION: Edema, unspecified (R60.9)  ------------------------------------------------------------------------  Dimensions:    Normal Values:  LA:     4.9    2.0 - 4.0 cm  Ao:     2.7    2.0 - 3.8 cm  SEPTUM: 1.3    0.6 - 1.2 cm  PWT:    1.2    0.6 - 1.1 cm  LVIDd:  4.8    3.0 - 5.6 cm  LVIDs:  4.3    1.8 - 4.0 cm  Derived variables:  LVMI: 128 g/m2  RWT: 0.50  Fractional short: 10 %  EF (Visual Estimate): 20-25 %  Doppler Peak Velocity (m/sec): AoV=1.2  ------------------------------------------------------------------------  Observations:  Mitral Valve: Mitral annular calcification;  Tethered  mitral valve leaflets with normal opening. Moderate mitral  regurgitation.  Aortic Valve/Aorta: Calcified trileaflet aortic valve with  decreased opening. Peak transaortic valve gradient equals 6  mm Hg, mean transaortic valve gradient equals 3 mm Hg,  estimated aortic valve area equals 1.7 sqcm (by continuity  equation), aortic valve velocity time integral equals 29  cm, consistent with mild aortic stenosis. Mild aortic  regurgitation.  Aortic Root: 2.7 cm.  Left Atrium: Mildly dilated left atrium.  LA volume index =  38 cc/m2.  Left Ventricle: Severe segmental left ventricular systolic  dysfunction. The function is best preserved at basal to mid  lateral wall, basal inferolateral wal and basal anterior  wall; all remaining segments are hypokinetic. Moderate  diastolic dysfunction  Right Heart: A device wire is noted in the right heart.  There appears to be thickening on wire with a possible  mobile elements- clinical cooelation indicated.  Normal  right ventricular size with decreased right ventricular  systolic function. Tricuspid valve not well visualized.  Normal pulmonic valve. Mild-moderate pulmonic  regurgitation.  Pericardium/Pleura: Normal pericardium with no pericardial  effusion.  Hemodynamic: Estimated right atrial pressure is 8 mm Hg.  Estimated right ventricular systolic pressure equals 46 mm  Hg, assuming right atrial pressure equals 8 mm Hg,  consistent with mild pulmonary hypertension.  ------------------------------------------------------------------------  Conclusions:  1. Mitral annular calcification;  Tethered mitral valve  leaflets with normal opening. Moderate mitral  regurgitation.  2. Calcified trileaflet aortic valve with decreased  opening. Peak transaortic valve gradient equals 6 mm Hg,  mean transaortic valve gradient equals 3 mm Hg, estimated  aortic valve area equals 1.7 sqcm (by continuity equation),  aortic valve velocity time integral equals 29 cm,  consistent with mild aortic stenosis. Mild aortic  regurgitation.  3. Severe segmental left ventricular systolic dysfunction.  The function is best preserved at basal to mid lateral  wall, basal inferolateral wal and basal anterior wall; all  remaining segments are hypokinetic.  4. Moderate diastolic dysfunction  5. A device wire is noted in the right heart. There appears  to be thickening on wire with a possible mobile elements-  clinical cooelation indicated.  6. Normal right ventricular size with decreased right  ventricular systolic function.  *** No previous Echo exam.  ------------------------------------------------------------------------  Confirmed on  8/19/2019 - 16:33:40 by Barbie Stokes M.D.  ------------------------------------------------------------------------      Stress Testing:    Imaging:    Labs: Personally reviewed                        13.3   6.73  )-----------( 258      ( 20 Aug 2019 08:14 )             40.4     08-20    135  |  94<L>  |  18  ----------------------------<  100<H>  3.8   |  31  |  1.14    Ca    8.7      20 Aug 2019 06:32            Serum Pro-Brain Natriuretic Peptide: 07535 pg/mL (08-16 @ 23:07) Patient seen and evaluated at bedside    Chief Complaint:    Hx obtained from chart, [place]  unable to connect a Kannada-speaking , unable to reach pt's son.      HPI:  89 yo man (visiting from [place], speaks Kannada) w/ hx of htn, prostate cancer s/p treatment, who presented with 2 weeks of dyspnea associated with progressive LLE, admitted for HF exacerbation. During this hospital course pt has been managed with IV furosemide 40 mg q8h.  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  Pt denies chest pain, palpitations orthopnea or hx of MI.  Pt is visiting family from [facility] and is hard of hearing.  Cardiology consulted for management of pacemaker lead thrombus       PMH:   BPH (benign prostatic hyperplasia)  Hypertension      PSH:   History of pacemaker      Medications:   furosemide   Injectable 40 milliGRAM(s) IV Push two times a day  melatonin 3 milliGRAM(s) Oral at bedtime PRN      Allergies:  No Known Allergies      FAMILY HISTORY:  Unkown     Social History:  The patient lives with his son.  He denies tobacco or alcohol use.    Review of Systems:  REVIEW OF SYSTEMS:  Limited ROS due to language barrier and inability to obtain   Pt states "no" when asked about chest pain but "yes" to trouble breathing    Physical Exam:  T(F): 97.8 (08-20), Max: 98.8 (08-20)  HR: 64 (08-20) (64 - 85)  BP: 114/61 (08-20) (114/61 - 154/79)  RR: 18 (08-20)  SpO2: 98% (08-20)    GENERAL: No acute distress, well-developed  ENT: EOMI, PERRLA, conjunctiva and sclera clear, Neck supple, No JVD, moist mucosa  CHEST/LUNG: Bibasilar crackles; No wheeze, equal breath sounds bilaterally   HEART: Regular rate and rhythm; No murmurs, rubs, or gallops  ABDOMEN: Soft, Nontender, Nondistended; Bowel sounds present  EXTREMITIES:  No clubbing, cyanosis, or edema  PSYCH: Nl behavior, nl affect  NEUROLOGY: non-focal, cranial nerves intact  SKIN: Normal color, No rashes or lesions  LINES:    Cardiovascular Diagnostic Testing:    ECG: Personally reviewed, atrially sensed, ventricularly paced rhythm at 68 bpm    Echo:  Patient name: MAKENZIE, BECK  YOB: 1928   Age: 89 (M)   MR#: [id number]  Study Date: 8/19/2019  [facility]  Study quality: Technically fair  Referring Physician: Bolivar Solomon MD  Blood Pressure: 124/51 mmHg  Height: 170 cm  Weight: 70 kg  BSA: 1.8 m2  ------------------------------------------------------------------------  PROCEDURE: Transthoracic echocardiogram with 2-D, M-Mode  and complete spectral and color flow Doppler. Verbal  consent was obtained for injection of  Ultrasonic Enhancing  Agent following a discussion of risks and benefits.  Following intravenous injection of Ultrasonic Enhancing  Agent , harmonic imaging was performed.  INDICATION: Edema, unspecified (R60.9)  ------------------------------------------------------------------------  Dimensions:    Normal Values:  LA:     4.9    2.0 - 4.0 cm  Ao:     2.7    2.0 - 3.8 cm  SEPTUM: 1.3    0.6 - 1.2 cm  PWT:    1.2    0.6 - 1.1 cm  LVIDd:  4.8    3.0 - 5.6 cm  LVIDs:  4.3    1.8 - 4.0 cm  Derived variables:  LVMI: 128 g/m2  RWT: 0.50  Fractional short: 10 %  EF (Visual Estimate): 20-25 %  Doppler Peak Velocity (m/sec): AoV=1.2  ------------------------------------------------------------------------  Observations:  Mitral Valve: Mitral annular calcification;  Tethered  mitral valve leaflets with normal opening. Moderate mitral  regurgitation.  Aortic Valve/Aorta: Calcified trileaflet aortic valve with  decreased opening. Peak transaortic valve gradient equals 6  mm Hg, mean transaortic valve gradient equals 3 mm Hg,  estimated aortic valve area equals 1.7 sqcm (by continuity  equation), aortic valve velocity time integral equals 29  cm, consistent with mild aortic stenosis. Mild aortic  regurgitation.  Aortic Root: 2.7 cm.  Left Atrium: Mildly dilated left atrium.  LA volume index =  38 cc/m2.  Left Ventricle: Severe segmental left ventricular systolic  dysfunction. The function is best preserved at basal to mid  lateral wall, basal inferolateral wal and basal anterior  wall; all remaining segments are hypokinetic. Moderate  diastolic dysfunction  Right Heart: A device wire is noted in the right heart.  There appears to be thickening on wire with a possible  mobile elements- clinical cooelation indicated.  Normal  right ventricular size with decreased right ventricular  systolic function. Tricuspid valve not well visualized.  Normal pulmonic valve. Mild-moderate pulmonic  regurgitation.  Pericardium/Pleura: Normal pericardium with no pericardial  effusion.  Hemodynamic: Estimated right atrial pressure is 8 mm Hg.  Estimated right ventricular systolic pressure equals 46 mm  Hg, assuming right atrial pressure equals 8 mm Hg,  consistent with mild pulmonary hypertension.  ------------------------------------------------------------------------  Conclusions:  1. Mitral annular calcification;  Tethered mitral valve  leaflets with normal opening. Moderate mitral  regurgitation.  2. Calcified trileaflet aortic valve with decreased  opening. Peak transaortic valve gradient equals 6 mm Hg,  mean transaortic valve gradient equals 3 mm Hg, estimated  aortic valve area equals 1.7 sqcm (by continuity equation),  aortic valve velocity time integral equals 29 cm,  consistent with mild aortic stenosis. Mild aortic  regurgitation.  3. Severe segmental left ventricular systolic dysfunction.  The function is best preserved at basal to mid lateral  wall, basal inferolateral wal and basal anterior wall; all  remaining segments are hypokinetic.  4. Moderate diastolic dysfunction  5. A device wire is noted in the right heart. There appears  to be thickening on wire with a possible mobile elements-  clinical cooelation indicated.  6. Normal right ventricular size with decreased right  ventricular systolic function.  *** No previous Echo exam.  ------------------------------------------------------------------------  Confirmed on  8/19/2019 - 16:33:40 by Barbie Stokes M.D.  ------------------------------------------------------------------------      Stress Testing:    Imaging:    Labs: Personally reviewed                        13.3   6.73  )-----------( 258      ( 20 Aug 2019 08:14 )             40.4     08-20    135  |  94<L>  |  18  ----------------------------<  100<H>  3.8   |  31  |  1.14    Ca    8.7      20 Aug 2019 06:32            Serum Pro-Brain Natriuretic Peptide: [id number] pg/mL (08-16 @ 23:07) Patient seen and evaluated at bedside    Chief Complaint:    Hx obtained from chart, [place]  unable to connect a Kannada-speaking , unable to reach pt's son.      HPI:  89 yo man (visiting from [place], speaks Kannada) w/ hx of htn, prostate cancer s/p treatment, who presented with 2 weeks of dyspnea associated with progressive LLE, admitted for HF exacerbation. During this hospital course pt has been managed with IV furosemide 40 mg q8h.  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  Pt denies chest pain, palpitations orthopnea or hx of MI.  Pt is visiting family from [facility] and is hard of hearing.  Cardiology consulted for management of pacemaker lead thrombus       PMH:   BPH (benign prostatic hyperplasia)  Hypertension      PSH:   History of pacemaker      Medications:   furosemide   Injectable 40 milliGRAM(s) IV Push two times a day  melatonin 3 milliGRAM(s) Oral at bedtime PRN      Allergies:  No Known Allergies      FAMILY HISTORY:  Unkown     Social History:  The patient lives with his son.  He denies tobacco or alcohol use.    Review of Systems:  REVIEW OF SYSTEMS:  Limited ROS due to language barrier and inability to obtain   Pt states "no" when asked about chest pain but "yes" to trouble breathing    Physical Exam:  T(F): 97.8 (08-20), Max: 98.8 (08-20)  HR: 64 (08-20) (64 - 85)  BP: 114/61 (08-20) (114/61 - 154/79)  RR: 18 (08-20)  SpO2: 98% (08-20)    GENERAL: No acute distress, well-developed  ENT: EOMI, PERRLA, conjunctiva and sclera clear, Neck supple, No JVD, moist mucosa  CHEST/LUNG: Bibasilar crackles; No wheeze, equal breath sounds bilaterally   HEART: Regular rate and rhythm; No murmurs, rubs, or gallops  ABDOMEN: Soft, Nontender, Nondistended; Bowel sounds present  EXTREMITIES:  No clubbing, cyanosis, 1+ pitting edema to rogers tibia bilaterally   PSYCH: Nl behavior, nl affect  NEUROLOGY: non-focal, cranial nerves intact  SKIN: Normal color, No rashes or lesions  LINES:    Cardiovascular Diagnostic Testing:    ECG: Personally reviewed, atrially sensed, ventricularly paced rhythm at 68 bpm    Echo:  Patient name: BECK BANEGAS  YOB: 1928   Age: 89 (M)   MR#: [id number]  Study Date: 8/19/2019  [facility]  Study quality: Technically fair  Referring Physician: Bolivar Solomon MD  Blood Pressure: 124/51 mmHg  Height: 170 cm  Weight: 70 kg  BSA: 1.8 m2  ------------------------------------------------------------------------  PROCEDURE: Transthoracic echocardiogram with 2-D, M-Mode  and complete spectral and color flow Doppler. Verbal  consent was obtained for injection of  Ultrasonic Enhancing  Agent following a discussion of risks and benefits.  Following intravenous injection of Ultrasonic Enhancing  Agent , harmonic imaging was performed.  INDICATION: Edema, unspecified (R60.9)  ------------------------------------------------------------------------  Dimensions:    Normal Values:  LA:     4.9    2.0 - 4.0 cm  Ao:     2.7    2.0 - 3.8 cm  SEPTUM: 1.3    0.6 - 1.2 cm  PWT:    1.2    0.6 - 1.1 cm  LVIDd:  4.8    3.0 - 5.6 cm  LVIDs:  4.3    1.8 - 4.0 cm  Derived variables:  LVMI: 128 g/m2  RWT: 0.50  Fractional short: 10 %  EF (Visual Estimate): 20-25 %  Doppler Peak Velocity (m/sec): AoV=1.2  ------------------------------------------------------------------------  Observations:  Mitral Valve: Mitral annular calcification;  Tethered  mitral valve leaflets with normal opening. Moderate mitral  regurgitation.  Aortic Valve/Aorta: Calcified trileaflet aortic valve with  decreased opening. Peak transaortic valve gradient equals 6  mm Hg, mean transaortic valve gradient equals 3 mm Hg,  estimated aortic valve area equals 1.7 sqcm (by continuity  equation), aortic valve velocity time integral equals 29  cm, consistent with mild aortic stenosis. Mild aortic  regurgitation.  Aortic Root: 2.7 cm.  Left Atrium: Mildly dilated left atrium.  LA volume index =  38 cc/m2.  Left Ventricle: Severe segmental left ventricular systolic  dysfunction. The function is best preserved at basal to mid  lateral wall, basal inferolateral wal and basal anterior  wall; all remaining segments are hypokinetic. Moderate  diastolic dysfunction  Right Heart: A device wire is noted in the right heart.  There appears to be thickening on wire with a possible  mobile elements- clinical cooelation indicated.  Normal  right ventricular size with decreased right ventricular  systolic function. Tricuspid valve not well visualized.  Normal pulmonic valve. Mild-moderate pulmonic  regurgitation.  Pericardium/Pleura: Normal pericardium with no pericardial  effusion.  Hemodynamic: Estimated right atrial pressure is 8 mm Hg.  Estimated right ventricular systolic pressure equals 46 mm  Hg, assuming right atrial pressure equals 8 mm Hg,  consistent with mild pulmonary hypertension.  ------------------------------------------------------------------------  Conclusions:  1. Mitral annular calcification;  Tethered mitral valve  leaflets with normal opening. Moderate mitral  regurgitation.  2. Calcified trileaflet aortic valve with decreased  opening. Peak transaortic valve gradient equals 6 mm Hg,  mean transaortic valve gradient equals 3 mm Hg, estimated  aortic valve area equals 1.7 sqcm (by continuity equation),  aortic valve velocity time integral equals 29 cm,  consistent with mild aortic stenosis. Mild aortic  regurgitation.  3. Severe segmental left ventricular systolic dysfunction.  The function is best preserved at basal to mid lateral  wall, basal inferolateral wal and basal anterior wall; all  remaining segments are hypokinetic.  4. Moderate diastolic dysfunction  5. A device wire is noted in the right heart. There appears  to be thickening on wire with a possible mobile elements-  clinical cooelation indicated.  6. Normal right ventricular size with decreased right  ventricular systolic function.  *** No previous Echo exam.  ------------------------------------------------------------------------  Confirmed on  8/19/2019 - 16:33:40 by Barbie Stokes M.D.  ------------------------------------------------------------------------      Stress Testing:    Imaging:    Labs: Personally reviewed                        13.3   6.73  )-----------( 258      ( 20 Aug 2019 08:14 )             40.4     08-20    135  |  94<L>  |  18  ----------------------------<  100<H>  3.8   |  31  |  1.14    Ca    8.7      20 Aug 2019 06:32            Serum Pro-Brain Natriuretic Peptide: [id number] pg/mL (08-16 @ 23:07)

## 2019-08-20 NOTE — SWALLOW BEDSIDE ASSESSMENT ADULT - SWALLOW EVAL: FUNCTIONAL LEVEL AT TIME OF EVAL
Pt found AA+Ox2-3 to person, place, and generally to date "August". Able to follow directives. Speaks/understands English for basic wants/needs. Was able to communicate effectively re: reason for hospitalization.

## 2019-08-20 NOTE — SWALLOW BEDSIDE ASSESSMENT ADULT - COMMENTS
Acute heart failure/exacerbation->given IV lasix. CXR of pulmonary edema, pleural effusions and elevated proBNP indicative of heart failure exacerbation. Monitored on tele. 8/18 pt with c/o anxiety, insomnia, cough preventing him from sleeping->given guaifenesin syrup  CXR 8/17: Cardiomegaly with mild pulmonary vascular congestion and small bilateral pleural effusions. No pneumothorax. Left chest wall pacemaker. Initially some ?confusion; open mouth posture and pouring water from cup into oral cavity. Given min cues to "take small sips", patient resumed normal pattern of drinking from cup and water bottle.

## 2019-08-20 NOTE — CONSULT NOTE ADULT - ATTENDING COMMENTS
Patient interviewed and examined. I was physically present for the essential portions of the E/M service provided.  Chart reviewed and note edited where appropriate.  Case discussed with fellow.  Agree w/ Assessment and Plan as outlined.    Panda Milian MD [facility]  Spectra:  [phone]  Office: [phone]

## 2019-08-20 NOTE — SWALLOW BEDSIDE ASSESSMENT ADULT - SLP PERTINENT HISTORY OF CURRENT PROBLEM
89yoM with PMH of htn, prostate cancer s/p treatment, ppm (indication unknown) who presents to the ED with complaint of dyspnea. He endorses having worsening dyspnea with exertion over the last 2 weeks, and lower extremity edema progressing to the knees bilaterally. He also complains of persistent dry cough. He denies any fever, chills, dizziness, CP or palpitations. He denies orthopnea. He has no history of MI, renal or hepatic dysfunction.

## 2019-08-20 NOTE — SWALLOW BEDSIDE ASSESSMENT ADULT - ASR SWALLOW ASPIRATION MONITOR
upper respiratory infection/gurgly voice/fever/pneumonia/change of breathing pattern/cough/throat clearing/Monitor for s/s aspiration/laryngeal penetration. If noted:  D/C p.o. intake, provide non-oral nutrition/hydration/meds, and contact this service @ [phone]

## 2019-08-20 NOTE — PHYSICAL THERAPY INITIAL EVALUATION ADULT - PERTINENT HX OF CURRENT PROBLEM, REHAB EVAL
Patient is a 89 yo M with PMH of htn, prostate cancer s/p treatment, ppm (indication unknown) who presents to the ED with complaint of dyspnea and BLE edema 2/2 suspected heart failure exacerbation. CXR of pulmonary edema, pleural effusions

## 2019-08-21 LAB
ANION GAP SERPL CALC-SCNC: 10 MMOL/L — SIGNIFICANT CHANGE UP (ref 5–17)
APTT BLD: 185 SEC — CRITICAL HIGH (ref 27.5–36.3)
APTT BLD: 30.1 SEC — SIGNIFICANT CHANGE UP (ref 27.5–36.3)
APTT BLD: 74.8 SEC — HIGH (ref 27.5–36.3)
BASOPHILS # BLD AUTO: 0.07 K/UL — SIGNIFICANT CHANGE UP (ref 0–0.2)
BASOPHILS NFR BLD AUTO: 1.1 % — SIGNIFICANT CHANGE UP (ref 0–2)
BUN SERPL-MCNC: 21 MG/DL — SIGNIFICANT CHANGE UP (ref 7–23)
CALCIUM SERPL-MCNC: 8.3 MG/DL — LOW (ref 8.4–10.5)
CHLORIDE SERPL-SCNC: 93 MMOL/L — LOW (ref 96–108)
CO2 SERPL-SCNC: 31 MMOL/L — SIGNIFICANT CHANGE UP (ref 22–31)
CREAT SERPL-MCNC: 1.2 MG/DL — SIGNIFICANT CHANGE UP (ref 0.5–1.3)
EOSINOPHIL # BLD AUTO: 0.46 K/UL — SIGNIFICANT CHANGE UP (ref 0–0.5)
EOSINOPHIL NFR BLD AUTO: 7.2 % — HIGH (ref 0–6)
GLUCOSE SERPL-MCNC: 103 MG/DL — HIGH (ref 70–99)
HCT VFR BLD CALC: 39.4 % — SIGNIFICANT CHANGE UP (ref 39–50)
HCT VFR BLD CALC: 40.6 % — SIGNIFICANT CHANGE UP (ref 39–50)
HGB BLD-MCNC: 13 G/DL — SIGNIFICANT CHANGE UP (ref 13–17)
HGB BLD-MCNC: 13.2 G/DL — SIGNIFICANT CHANGE UP (ref 13–17)
IMM GRANULOCYTES NFR BLD AUTO: 0.3 % — SIGNIFICANT CHANGE UP (ref 0–1.5)
INR BLD: 0.94 RATIO — SIGNIFICANT CHANGE UP (ref 0.88–1.16)
INR BLD: 1.02 RATIO — SIGNIFICANT CHANGE UP (ref 0.88–1.16)
LYMPHOCYTES # BLD AUTO: 1.96 K/UL — SIGNIFICANT CHANGE UP (ref 1–3.3)
LYMPHOCYTES # BLD AUTO: 30.5 % — SIGNIFICANT CHANGE UP (ref 13–44)
MCHC RBC-ENTMCNC: 29.9 PG — SIGNIFICANT CHANGE UP (ref 27–34)
MCHC RBC-ENTMCNC: 30.1 PG — SIGNIFICANT CHANGE UP (ref 27–34)
MCHC RBC-ENTMCNC: 32.5 GM/DL — SIGNIFICANT CHANGE UP (ref 32–36)
MCHC RBC-ENTMCNC: 33 GM/DL — SIGNIFICANT CHANGE UP (ref 32–36)
MCV RBC AUTO: 91.2 FL — SIGNIFICANT CHANGE UP (ref 80–100)
MCV RBC AUTO: 92.1 FL — SIGNIFICANT CHANGE UP (ref 80–100)
MONOCYTES # BLD AUTO: 0.74 K/UL — SIGNIFICANT CHANGE UP (ref 0–0.9)
MONOCYTES NFR BLD AUTO: 11.5 % — SIGNIFICANT CHANGE UP (ref 2–14)
NEUTROPHILS # BLD AUTO: 3.18 K/UL — SIGNIFICANT CHANGE UP (ref 1.8–7.4)
NEUTROPHILS NFR BLD AUTO: 49.4 % — SIGNIFICANT CHANGE UP (ref 43–77)
PLATELET # BLD AUTO: 253 K/UL — SIGNIFICANT CHANGE UP (ref 150–400)
PLATELET # BLD AUTO: 276 K/UL — SIGNIFICANT CHANGE UP (ref 150–400)
POTASSIUM SERPL-MCNC: 3.9 MMOL/L — SIGNIFICANT CHANGE UP (ref 3.5–5.3)
POTASSIUM SERPL-SCNC: 3.9 MMOL/L — SIGNIFICANT CHANGE UP (ref 3.5–5.3)
PROTHROM AB SERPL-ACNC: 10.8 SEC — SIGNIFICANT CHANGE UP (ref 10–12.9)
PROTHROM AB SERPL-ACNC: 11.6 SEC — SIGNIFICANT CHANGE UP (ref 10–13.1)
RBC # BLD: 4.32 M/UL — SIGNIFICANT CHANGE UP (ref 4.2–5.8)
RBC # BLD: 4.41 M/UL — SIGNIFICANT CHANGE UP (ref 4.2–5.8)
RBC # FLD: 12.9 % — SIGNIFICANT CHANGE UP (ref 10.3–14.5)
RBC # FLD: 13.5 % — SIGNIFICANT CHANGE UP (ref 10.3–14.5)
SODIUM SERPL-SCNC: 134 MMOL/L — LOW (ref 135–145)
WBC # BLD: 6.43 K/UL — SIGNIFICANT CHANGE UP (ref 3.8–10.5)
WBC # BLD: 7.4 K/UL — SIGNIFICANT CHANGE UP (ref 3.8–10.5)
WBC # FLD AUTO: 6.43 K/UL — SIGNIFICANT CHANGE UP (ref 3.8–10.5)
WBC # FLD AUTO: 7.4 K/UL — SIGNIFICANT CHANGE UP (ref 3.8–10.5)

## 2019-08-21 PROCEDURE — 99233 SBSQ HOSP IP/OBS HIGH 50: CPT | Mod: GC

## 2019-08-21 RX ORDER — HEPARIN SODIUM 5000 [USP'U]/ML
2000 INJECTION INTRAVENOUS; SUBCUTANEOUS EVERY 6 HOURS
Refills: 0 | Status: DISCONTINUED | OUTPATIENT
Start: 2019-08-21 | End: 2019-08-27

## 2019-08-21 RX ORDER — WARFARIN SODIUM 2.5 MG/1
5 TABLET ORAL ONCE
Refills: 0 | Status: COMPLETED | OUTPATIENT
Start: 2019-08-21 | End: 2019-08-21

## 2019-08-21 RX ORDER — CARVEDILOL PHOSPHATE 80 MG/1
3.12 CAPSULE, EXTENDED RELEASE ORAL EVERY 12 HOURS
Refills: 0 | Status: DISCONTINUED | OUTPATIENT
Start: 2019-08-21 | End: 2019-08-22

## 2019-08-21 RX ORDER — HEPARIN SODIUM 5000 [USP'U]/ML
INJECTION INTRAVENOUS; SUBCUTANEOUS
Qty: 25000 | Refills: 0 | Status: DISCONTINUED | OUTPATIENT
Start: 2019-08-21 | End: 2019-08-27

## 2019-08-21 RX ORDER — HEPARIN SODIUM 5000 [USP'U]/ML
4500 INJECTION INTRAVENOUS; SUBCUTANEOUS EVERY 6 HOURS
Refills: 0 | Status: DISCONTINUED | OUTPATIENT
Start: 2019-08-21 | End: 2019-08-27

## 2019-08-21 RX ADMIN — HEPARIN SODIUM 0 UNIT(S)/HR: 5000 INJECTION INTRAVENOUS; SUBCUTANEOUS at 16:32

## 2019-08-21 RX ADMIN — CARVEDILOL PHOSPHATE 3.12 MILLIGRAM(S): 80 CAPSULE, EXTENDED RELEASE ORAL at 17:01

## 2019-08-21 RX ADMIN — Medication 40 MILLIGRAM(S): at 17:01

## 2019-08-21 RX ADMIN — CARVEDILOL PHOSPHATE 3.12 MILLIGRAM(S): 80 CAPSULE, EXTENDED RELEASE ORAL at 05:55

## 2019-08-21 RX ADMIN — HEPARIN SODIUM 1100 UNIT(S)/HR: 5000 INJECTION INTRAVENOUS; SUBCUTANEOUS at 09:55

## 2019-08-21 RX ADMIN — WARFARIN SODIUM 5 MILLIGRAM(S): 2.5 TABLET ORAL at 03:00

## 2019-08-21 RX ADMIN — HEPARIN SODIUM 900 UNIT(S)/HR: 5000 INJECTION INTRAVENOUS; SUBCUTANEOUS at 17:35

## 2019-08-21 RX ADMIN — WARFARIN SODIUM 5 MILLIGRAM(S): 2.5 TABLET ORAL at 21:00

## 2019-08-21 RX ADMIN — Medication 40 MILLIGRAM(S): at 05:59

## 2019-08-21 RX ADMIN — HEPARIN SODIUM 1100 UNIT(S)/HR: 5000 INJECTION INTRAVENOUS; SUBCUTANEOUS at 03:09

## 2019-08-21 NOTE — PROGRESS NOTE ADULT - SUBJECTIVE AND OBJECTIVE BOX
Patient seen and examined at bedside.    Overnight Events:   No events over night     Review Of Systems: No chest pain, shortness of breath, or palpitations            Medications:  carvedilol 3.125 milliGRAM(s) Oral every 12 hours  furosemide   Injectable 40 milliGRAM(s) IV Push two times a day  heparin  Infusion.  Unit(s)/Hr IV Continuous <Continuous>  heparin  Injectable 4500 Unit(s) IV Push every 6 hours PRN  heparin  Injectable 2000 Unit(s) IV Push every 6 hours PRN  melatonin 3 milliGRAM(s) Oral at bedtime PRN      PAST MEDICAL & SURGICAL HISTORY:  BPH (benign prostatic hyperplasia)  Hypertension  History of pacemaker: 2015 back in [facility]      Vitals:  T(F): 97.7 (08-21), Max: 98.1 (08-20)  HR: 72 (08-21) (64 - 73)  BP: 118/66 (08-21) (100/52 - 139/73)  RR: 20 (08-21)  SpO2: 97% (08-21)  I&O's Summary    20 Aug 2019 07:01  -  21 Aug 2019 07:00  --------------------------------------------------------  IN: 755 mL / OUT: 922 mL / NET: -167 mL        Physical Exam:  GENERAL: No acute distress, well-developed  ENT: EOMI, PERRLA, conjunctiva and sclera clear, Neck supple, No JVD, moist mucosa  CHEST/LUNG: Diminished breath, faint crackles at bases,no wheeze   HEART: Distant heart sounds, regular rate and rhythm; No murmurs, rubs, or gallops  ABDOMEN: Soft, Nontender, Nondistended; Bowel sounds present  EXTREMITIES:  No clubbing, cyanosis, 1+ pitting edema to mid tibia bilaterally   PSYCH: Nl behavior, nl affect  NEUROLOGY: non-focal, cranial nerves intact  SKIN: Normal color, No rashes or lesions                          13.2   6.43  )-----------( 276      ( 21 Aug 2019 09:44 )             40.6     08-21    134<L>  |  93<L>  |  21  ----------------------------<  103<H>  3.9   |  31  |  1.20    Ca    8.3<L>      21 Aug 2019 06:38      PT/INR - ( 21 Aug 2019 00:28 )   PT: 10.8 sec;   INR: 0.94 ratio         PTT - ( 21 Aug 2019 09:38 )  PTT:74.8 sec      Serum Pro-Brain Natriuretic Peptide: [id number] pg/mL (08-16 @ 23:07)        ECG: Personally reviewed, atrially sensed, ventricularly paced rhythm at 68 bpm    Echo:  Patient name: BECK BANEGAS  YOB: 1928   Age: 89 (M)   MR#: [id number]  Study Date: 8/19/2019  [facility]  Study quality: Technically fair  Referring Physician: Bolivar Solomon MD  Blood Pressure: 124/51 mmHg  Height: 170 cm  Weight: 70 kg  BSA: 1.8 m2  ------------------------------------------------------------------------  PROCEDURE: Transthoracic echocardiogram with 2-D, M-Mode  and complete spectral and color flow Doppler. Verbal  consent was obtained for injection of  Ultrasonic Enhancing  Agent following a discussion of risks and benefits.  Following intravenous injection of Ultrasonic Enhancing  Agent , harmonic imaging was performed.  INDICATION: Edema, unspecified (R60.9)  ------------------------------------------------------------------------  Dimensions:    Normal Values:  LA:     4.9    2.0 - 4.0 cm  Ao:     2.7    2.0 - 3.8 cm  SEPTUM: 1.3    0.6 - 1.2 cm  PWT:    1.2    0.6 - 1.1 cm  LVIDd:  4.8    3.0 - 5.6 cm  LVIDs:  4.3    1.8 - 4.0 cm  Derived variables:  LVMI: 128 g/m2  RWT: 0.50  Fractional short: 10 %  EF (Visual Estimate): 20-25 %  Doppler Peak Velocity (m/sec): AoV=1.2  ------------------------------------------------------------------------  Observations:  Mitral Valve: Mitral annular calcification;  Tethered  mitral valve leaflets with normal opening. Moderate mitral  regurgitation.  Aortic Valve/Aorta: Calcified trileaflet aortic valve with  decreased opening. Peak transaortic valve gradient equals 6  mm Hg, mean transaortic valve gradient equals 3 mm Hg,  estimated aortic valve area equals 1.7 sqcm (by continuity  equation), aortic valve velocity time integral equals 29  cm, consistent with mild aortic stenosis. Mild aortic  regurgitation.  Aortic Root: 2.7 cm.  Left Atrium: Mildly dilated left atrium.  LA volume index =  38 cc/m2.  Left Ventricle: Severe segmental left ventricular systolic  dysfunction. The function is best preserved at basal to mid  lateral wall, basal inferolateral wal and basal anterior  wall; all remaining segments are hypokinetic. Moderate  diastolic dysfunction  Right Heart: A device wire is noted in the right heart.  There appears to be thickening on wire with a possible  mobile elements- clinical cooelation indicated.  Normal  right ventricular size with decreased right ventricular  systolic function. Tricuspid valve not well visualized.  Normal pulmonic valve. Mild-moderate pulmonic  regurgitation.  Pericardium/Pleura: Normal pericardium with no pericardial  effusion.  Hemodynamic: Estimated right atrial pressure is 8 mm Hg.  Estimated right ventricular systolic pressure equals 46 mm  Hg, assuming right atrial pressure equals 8 mm Hg,  consistent with mild pulmonary hypertension.  ------------------------------------------------------------------------  Conclusions:  1. Mitral annular calcification;  Tethered mitral valve  leaflets with normal opening. Moderate mitral  regurgitation.  2. Calcified trileaflet aortic valve with decreased  opening. Peak transaortic valve gradient equals 6 mm Hg,  mean transaortic valve gradient equals 3 mm Hg, estimated  aortic valve area equals 1.7 sqcm (by continuity equation),  aortic valve velocity time integral equals 29 cm,  consistent with mild aortic stenosis. Mild aortic  regurgitation.  3. Severe segmental left ventricular systolic dysfunction.  The function is best preserved at basal to mid lateral  wall, basal inferolateral wal and basal anterior wall; all  remaining segments are hypokinetic.  4. Moderate diastolic dysfunction  5. A device wire is noted in the right heart. There appears  to be thickening on wire with a possible mobile elements-  clinical cooelation indicated.  6. Normal right ventricular size with decreased right  ventricular systolic function.  *** No previous Echo exam.  ------------------------------------------------------------------------  Confirmed on  8/19/2019 - 16:33:40 by Barbie Stokes M.D.  ------------------------------------------------------------------------ Patient seen and examined at bedside.    Overnight Events:   No events over night     Review Of Systems: No chest pain, shortness of breath, or palpitations            GEN:  Appetite normal.  No fever or chills.  HEENT:  No visual change or epistaxis.  LUNGS:  No cough, wheeze or hemoptysis  CARDIAC:  see HPI  GI: No N/V.  No diarrhea or constipation.  No melena or hematochezia  : No dysuria or hematuria  NEURO:  No amaurosis or speech disturbance.  No new focal weakness or sensory sxss.  PSYCH:  No anxiety or depression  SKIN: no rash.  No new ecchymoses.       Medications:  carvedilol 3.125 milliGRAM(s) Oral every 12 hours  furosemide   Injectable 40 milliGRAM(s) IV Push two times a day  heparin  Infusion.  Unit(s)/Hr IV Continuous <Continuous>  heparin  Injectable 4500 Unit(s) IV Push every 6 hours PRN  heparin  Injectable 2000 Unit(s) IV Push every 6 hours PRN  melatonin 3 milliGRAM(s) Oral at bedtime PRN      PAST MEDICAL & SURGICAL HISTORY:  BPH (benign prostatic hyperplasia)  Hypertension  History of pacemaker: 2015 back in [facility]      Vitals:  T(F): 97.7 (08-21), Max: 98.1 (08-20)  HR: 72 (08-21) (64 - 73)  BP: 118/66 (08-21) (100/52 - 139/73)  RR: 20 (08-21)  SpO2: 97% (08-21)  I&O's Summary    20 Aug 2019 07:01  -  21 Aug 2019 07:00  --------------------------------------------------------  IN: 755 mL / OUT: 922 mL / NET: -167 mL        Physical Exam:  GENERAL: No acute distress, well-developed  ENT: EOMI, PERRLA, conjunctiva and sclera clear, Neck supple, No JVD, moist mucosa  CHEST/LUNG: Diminished breath, faint crackles at bases,no wheeze   HEART: Distant heart sounds, regular rate and rhythm; No murmurs, rubs, or gallops  ABDOMEN: Soft, Nontender, Nondistended; Bowel sounds present  EXTREMITIES:  No clubbing, cyanosis, 1+ pitting edema to mid tibia bilaterally   PSYCH: Nl behavior, nl affect  NEUROLOGY: non-focal, cranial nerves intact  SKIN: Normal color, No rashes or lesions                          13.2   6.43  )-----------( 276      ( 21 Aug 2019 09:44 )             40.6     08-21    134<L>  |  93<L>  |  21  ----------------------------<  103<H>  3.9   |  31  |  1.20    Ca    8.3<L>      21 Aug 2019 06:38      PT/INR - ( 21 Aug 2019 00:28 )   PT: 10.8 sec;   INR: 0.94 ratio         PTT - ( 21 Aug 2019 09:38 )  PTT:74.8 sec      Serum Pro-Brain Natriuretic Peptide: 61531 pg/mL (08-16 @ 23:07)        ECG: Personally reviewed, atrially sensed, ventricularly paced rhythm at 68 bpm    Echo:  Patient name: BECK BANEGAS  YOB: 1928   Age: 89 (M)   MR#: [id number]  Study Date: 8/19/2019  [facility]  Study quality: Technically fair  Referring Physician: Bolivar Solomon MD  Blood Pressure: 124/51 mmHg  Height: 170 cm  Weight: 70 kg  BSA: 1.8 m2  ------------------------------------------------------------------------  PROCEDURE: Transthoracic echocardiogram with 2-D, M-Mode  and complete spectral and color flow Doppler. Verbal  consent was obtained for injection of  Ultrasonic Enhancing  Agent following a discussion of risks and benefits.  Following intravenous injection of Ultrasonic Enhancing  Agent , harmonic imaging was performed.  INDICATION: Edema, unspecified (R60.9)  ------------------------------------------------------------------------  Dimensions:    Normal Values:  LA:     4.9    2.0 - 4.0 cm  Ao:     2.7    2.0 - 3.8 cm  SEPTUM: 1.3    0.6 - 1.2 cm  PWT:    1.2    0.6 - 1.1 cm  LVIDd:  4.8    3.0 - 5.6 cm  LVIDs:  4.3    1.8 - 4.0 cm  Derived variables:  LVMI: 128 g/m2  RWT: 0.50  Fractional short: 10 %  EF (Visual Estimate): 20-25 %  Doppler Peak Velocity (m/sec): AoV=1.2  ------------------------------------------------------------------------  Observations:  Mitral Valve: Mitral annular calcification;  Tethered  mitral valve leaflets with normal opening. Moderate mitral  regurgitation.  Aortic Valve/Aorta: Calcified trileaflet aortic valve with  decreased opening. Peak transaortic valve gradient equals 6  mm Hg, mean transaortic valve gradient equals 3 mm Hg,  estimated aortic valve area equals 1.7 sqcm (by continuity  equation), aortic valve velocity time integral equals 29  cm, consistent with mild aortic stenosis. Mild aortic  regurgitation.  Aortic Root: 2.7 cm.  Left Atrium: Mildly dilated left atrium.  LA volume index =  38 cc/m2.  Left Ventricle: Severe segmental left ventricular systolic  dysfunction. The function is best preserved at basal to mid  lateral wall, basal inferolateral wal and basal anterior  wall; all remaining segments are hypokinetic. Moderate  diastolic dysfunction  Right Heart: A device wire is noted in the right heart.  There appears to be thickening on wire with a possible  mobile elements- clinical cooelation indicated.  Normal  right ventricular size with decreased right ventricular  systolic function. Tricuspid valve not well visualized.  Normal pulmonic valve. Mild-moderate pulmonic  regurgitation.  Pericardium/Pleura: Normal pericardium with no pericardial  effusion.  Hemodynamic: Estimated right atrial pressure is 8 mm Hg.  Estimated right ventricular systolic pressure equals 46 mm  Hg, assuming right atrial pressure equals 8 mm Hg,  consistent with mild pulmonary hypertension.  ------------------------------------------------------------------------  Conclusions:  1. Mitral annular calcification;  Tethered mitral valve  leaflets with normal opening. Moderate mitral  regurgitation.  2. Calcified trileaflet aortic valve with decreased  opening. Peak transaortic valve gradient equals 6 mm Hg,  mean transaortic valve gradient equals 3 mm Hg, estimated  aortic valve area equals 1.7 sqcm (by continuity equation),  aortic valve velocity time integral equals 29 cm,  consistent with mild aortic stenosis. Mild aortic  regurgitation.  3. Severe segmental left ventricular systolic dysfunction.  The function is best preserved at basal to mid lateral  wall, basal inferolateral wal and basal anterior wall; all  remaining segments are hypokinetic.  4. Moderate diastolic dysfunction  5. A device wire is noted in the right heart. There appears  to be thickening on wire with a possible mobile elements-  clinical cooelation indicated.  6. Normal right ventricular size with decreased right  ventricular systolic function.  *** No previous Echo exam.  ------------------------------------------------------------------------  Confirmed on  8/19/2019 - 16:33:40 by Barbie Stokes M.D.  ------------------------------------------------------------------------

## 2019-08-21 NOTE — CHART NOTE - NSCHARTNOTEFT_GEN_A_CORE
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 Per Neha, started heparin to Coumadin (5mg) bridge and carvedilol 3.125 q12 hrs. Will relay to primary team in AM.

## 2019-08-22 LAB
ANION GAP SERPL CALC-SCNC: 11 MMOL/L — SIGNIFICANT CHANGE UP (ref 5–17)
APTT BLD: 104.4 SEC — HIGH (ref 27.5–36.3)
APTT BLD: 121.9 SEC — CRITICAL HIGH (ref 27.5–36.3)
APTT BLD: 90.1 SEC — HIGH (ref 27.5–36.3)
APTT BLD: 96.4 SEC — HIGH (ref 27.5–36.3)
BUN SERPL-MCNC: 22 MG/DL — SIGNIFICANT CHANGE UP (ref 7–23)
CALCIUM SERPL-MCNC: 9.1 MG/DL — SIGNIFICANT CHANGE UP (ref 8.4–10.5)
CHLORIDE SERPL-SCNC: 92 MMOL/L — LOW (ref 96–108)
CO2 SERPL-SCNC: 30 MMOL/L — SIGNIFICANT CHANGE UP (ref 22–31)
CREAT SERPL-MCNC: 1.31 MG/DL — HIGH (ref 0.5–1.3)
GLUCOSE SERPL-MCNC: 96 MG/DL — SIGNIFICANT CHANGE UP (ref 70–99)
HCT VFR BLD CALC: 39.6 % — SIGNIFICANT CHANGE UP (ref 39–50)
HGB BLD-MCNC: 12.7 G/DL — LOW (ref 13–17)
INR BLD: 1.02 RATIO — SIGNIFICANT CHANGE UP (ref 0.88–1.16)
MCHC RBC-ENTMCNC: 29.6 PG — SIGNIFICANT CHANGE UP (ref 27–34)
MCHC RBC-ENTMCNC: 32.1 GM/DL — SIGNIFICANT CHANGE UP (ref 32–36)
MCV RBC AUTO: 92.3 FL — SIGNIFICANT CHANGE UP (ref 80–100)
PLATELET # BLD AUTO: 255 K/UL — SIGNIFICANT CHANGE UP (ref 150–400)
POTASSIUM SERPL-MCNC: 4.1 MMOL/L — SIGNIFICANT CHANGE UP (ref 3.5–5.3)
POTASSIUM SERPL-SCNC: 4.1 MMOL/L — SIGNIFICANT CHANGE UP (ref 3.5–5.3)
PROTHROM AB SERPL-ACNC: 11.6 SEC — SIGNIFICANT CHANGE UP (ref 10–12.9)
RBC # BLD: 4.29 M/UL — SIGNIFICANT CHANGE UP (ref 4.2–5.8)
RBC # FLD: 13.7 % — SIGNIFICANT CHANGE UP (ref 10.3–14.5)
SODIUM SERPL-SCNC: 133 MMOL/L — LOW (ref 135–145)
WBC # BLD: 6.56 K/UL — SIGNIFICANT CHANGE UP (ref 3.8–10.5)
WBC # FLD AUTO: 6.56 K/UL — SIGNIFICANT CHANGE UP (ref 3.8–10.5)

## 2019-08-22 PROCEDURE — 74177 CT ABD & PELVIS W/CONTRAST: CPT | Mod: 26

## 2019-08-22 PROCEDURE — 99232 SBSQ HOSP IP/OBS MODERATE 35: CPT | Mod: GC

## 2019-08-22 RX ORDER — CARVEDILOL PHOSPHATE 80 MG/1
6.25 CAPSULE, EXTENDED RELEASE ORAL EVERY 12 HOURS
Refills: 0 | Status: DISCONTINUED | OUTPATIENT
Start: 2019-08-22 | End: 2019-08-27

## 2019-08-22 RX ORDER — FUROSEMIDE 40 MG
60 TABLET ORAL DAILY
Refills: 0 | Status: DISCONTINUED | OUTPATIENT
Start: 2019-08-22 | End: 2019-08-27

## 2019-08-22 RX ORDER — WARFARIN SODIUM 2.5 MG/1
7 TABLET ORAL ONCE
Refills: 0 | Status: COMPLETED | OUTPATIENT
Start: 2019-08-22 | End: 2019-08-22

## 2019-08-22 RX ORDER — WARFARIN SODIUM 2.5 MG/1
7 TABLET ORAL ONCE
Refills: 0 | Status: DISCONTINUED | OUTPATIENT
Start: 2019-08-22 | End: 2019-08-22

## 2019-08-22 RX ADMIN — HEPARIN SODIUM 700 UNIT(S)/HR: 5000 INJECTION INTRAVENOUS; SUBCUTANEOUS at 07:54

## 2019-08-22 RX ADMIN — Medication 60 MILLIGRAM(S): at 21:20

## 2019-08-22 RX ADMIN — HEPARIN SODIUM 700 UNIT(S)/HR: 5000 INJECTION INTRAVENOUS; SUBCUTANEOUS at 14:43

## 2019-08-22 RX ADMIN — HEPARIN SODIUM 700 UNIT(S)/HR: 5000 INJECTION INTRAVENOUS; SUBCUTANEOUS at 21:18

## 2019-08-22 RX ADMIN — CARVEDILOL PHOSPHATE 3.12 MILLIGRAM(S): 80 CAPSULE, EXTENDED RELEASE ORAL at 17:03

## 2019-08-22 RX ADMIN — Medication 40 MILLIGRAM(S): at 17:03

## 2019-08-22 RX ADMIN — CARVEDILOL PHOSPHATE 3.12 MILLIGRAM(S): 80 CAPSULE, EXTENDED RELEASE ORAL at 05:08

## 2019-08-22 RX ADMIN — WARFARIN SODIUM 7 MILLIGRAM(S): 2.5 TABLET ORAL at 21:20

## 2019-08-22 RX ADMIN — Medication 40 MILLIGRAM(S): at 05:08

## 2019-08-22 RX ADMIN — HEPARIN SODIUM 800 UNIT(S)/HR: 5000 INJECTION INTRAVENOUS; SUBCUTANEOUS at 00:54

## 2019-08-22 RX ADMIN — CARVEDILOL PHOSPHATE 6.25 MILLIGRAM(S): 80 CAPSULE, EXTENDED RELEASE ORAL at 21:21

## 2019-08-22 NOTE — PROGRESS NOTE ADULT - ATTENDING COMMENTS
Patient interviewed and examined. I was physically present for the essential portions of the E/M service provided.  Chart reviewed and note edited where appropriate.  Case discussed with fellow.  Agree w/ Assessment and Plan as outlined.    Panda Milian MD [facility]  Spectra:  [phone]  Office: [phone]
Patient interviewed and examined. I was physically present for the essential portions of the E/M service provided.  Chart reviewed and note edited where appropriate.  Case discussed with fellow.  Agree w/ Assessment and Plan as outlined.    Panda Milian MD [facility]  Spectra:  [phone]  Office: [phone]

## 2019-08-22 NOTE — PROGRESS NOTE ADULT - SUBJECTIVE AND OBJECTIVE BOX
Patient seen and examined at bedside.    Overnight Events: No acute events overnight. Pt w/o SOB.       REVIEW OF SYSTEMS:  Constitutional:     [ ] negative [ ] fevers [ ] chills [ ] weight loss [ ] weight gain  HEENT:                  [ ] negative [ ] dry eyes [ ] eye irritation [ ] postnasal drip [ ] nasal congestion  CV:                         [ ] negative  [ ] chest pain [ ] orthopnea [ ] palpitations [ ] murmur  Resp:                     [ ] negative [ ] cough [ ] shortness of breath [ ] dyspnea [ ] wheezing [ ] sputum [ ]hemoptysis  GI:                          [ ] negative [ ] nausea [ ] vomiting [ ] diarrhea [ ] constipation [ ] abd pain [ ] dysphagia   :                        [ ] negative [ ] dysuria [ ] nocturia [ ] hematuria [ ] increased urinary frequency  Musculoskeletal: [ ] negative [ ] back pain [ ] myalgias [ ] arthralgias [ ] fracture  Skin:                       [ ] negative [ ] rash [ ] itch  Neurological:        [ ] negative [ ] headache [ ] dizziness [ ] syncope [ ] weakness [ ] numbness  Psychiatric:           [ ] negative [ ] anxiety [ ] depression  Endocrine:            [ ] negative [ ] diabetes [ ] thyroid problem  Heme/Lymph:      [ ] negative [ ] anemia [ ] bleeding problem  Allergic/Immune: [ ] negative [ ] itchy eyes [ ] nasal discharge [ ] hives [ ] angioedema    [ x] All other systems negative  [ ] Unable to assess ROS due to    Current Meds:  carvedilol 3.125 milliGRAM(s) Oral every 12 hours  furosemide   Injectable 40 milliGRAM(s) IV Push two times a day  heparin  Infusion.  Unit(s)/Hr IV Continuous <Continuous>  heparin  Injectable 4500 Unit(s) IV Push every 6 hours PRN  heparin  Injectable 2000 Unit(s) IV Push every 6 hours PRN  melatonin 3 milliGRAM(s) Oral at bedtime PRN      PAST MEDICAL & SURGICAL HISTORY:  BPH (benign prostatic hyperplasia)  Hypertension  History of pacemaker: 2015 back in [place]      Vitals:  T(F): 97.9 (08-22), Max: 98.2 (08-22)  HR: 66 (08-22) (66 - 81)  BP: 128/58 (08-22) (117/62 - 137/84)  RR: 18 (08-22)  SpO2: 100% (08-22)  I&O's Summary    21 Aug 2019 07:01  -  22 Aug 2019 07:00  --------------------------------------------------------  IN: 870 mL / OUT: 400 mL / NET: 470 mL        Physical Exam:  GENERAL: No acute distress, well-developed  ENT: EOMI, PERRLA, conjunctiva and sclera clear, Neck supple, No JVD, moist mucosa  CHEST/LUNG: Diminished breath, faint crackles at bases,no wheeze   HEART: Distant heart sounds, regular rate and rhythm; No murmurs, rubs, or gallops  ABDOMEN: Soft, Nontender, Nondistended; Bowel sounds present  EXTREMITIES:  No clubbing, cyanosis, 1+ pitting edema to mid tibia bilaterally   PSYCH: Nl behavior, nl affect  NEUROLOGY: non-focal, cranial nerves intact  SKIN: Normal color, No rashes or lesions                          12.7   6.56  )-----------( 255      ( 22 Aug 2019 08:15 )             39.6     08-22    133<L>  |  92<L>  |  22  ----------------------------<  96  4.1   |  30  |  1.31<H>    Ca    9.1      22 Aug 2019 06:43      PT/INR - ( 22 Aug 2019 06:43 )   PT: 11.6 sec;   INR: 1.02 ratio         PTT - ( 22 Aug 2019 06:43 )  PTT:104.4 sec      Serum Pro-Brain Natriuretic Peptide: 08406 pg/mL (08-16 @ 23:07)          New ECG(s): Personally reviewed    Echo:  < from: TTE with Doppler (w/Cont) (08.19.19 @ 13:25) >  1. Mitral annular calcification;  Tethered mitral valve  leaflets with normal opening. Moderate mitral  regurgitation.  2. Calcified trileaflet aortic valve with decreased  opening. Peak transaortic valve gradient equals 6 mm Hg,  mean transaortic valve gradient equals 3 mm Hg, estimated  aortic valve area equals 1.7 sqcm (by continuity equation),  aortic valve velocity time integral equals 29 cm,  consistent with mild aortic stenosis. Mild aortic  regurgitation.  3. Severe segmental left ventricular systolic dysfunction.  The function is best preserved at basal to mid lateral  wall, basal inferolateral wal and basal anterior wall; all  remaining segments are hypokinetic.  4. Moderate diastolic dysfunction  5. A device wire is noted in the right heart. There appears  to be thickening on wire with a possible mobile elements-  clinical cooelation indicated.  6. Normal right ventricular size with decreased right  ventricular systolic function.    < end of copied text >

## 2019-08-22 NOTE — PROGRESS NOTE ADULT - SUBJECTIVE AND OBJECTIVE BOX
Patient is a 89y old  Male who presents with a chief complaint of dyspnea (22 Aug 2019 10:22)      SUBJECTIVE / OVERNIGHT EVENTS:    Events noted.  CONSTITUTIONAL: No fever,  or fatigue  RESPIRATORY: No cough, wheezing,  No shortness of breath  CARDIOVASCULAR: No chest pain, palpitations,   GASTROINTESTINAL: No abdominal or epigastric pain.  NEUROLOGICAL: No headaches,     MEDICATIONS  (STANDING):  carvedilol 6.25 milliGRAM(s) Oral every 12 hours  furosemide    Tablet 60 milliGRAM(s) Oral daily  heparin  Infusion.  Unit(s)/Hr (11 mL/Hr) IV Continuous <Continuous>    MEDICATIONS  (PRN):  heparin  Injectable 4500 Unit(s) IV Push every 6 hours PRN For aPTT less than 40  heparin  Injectable 2000 Unit(s) IV Push every 6 hours PRN For aPTT between 40 - 57  melatonin 3 milliGRAM(s) Oral at bedtime PRN Insomnia        CAPILLARY BLOOD GLUCOSE        I&O's Summary    21 Aug 2019 07:01  -  22 Aug 2019 07:00  --------------------------------------------------------  IN: 870 mL / OUT: 400 mL / NET: 470 mL    22 Aug 2019 07:01  -  23 Aug 2019 00:04  --------------------------------------------------------  IN: 240 mL / OUT: 0 mL / NET: 240 mL        PHYSICAL EXAM:  GENERAL: NAD  NECK: Supple, No JVD  CHEST/LUNG: Clear to auscultation bilaterally; No wheezing.  HEART: Regular rate and rhythm; No murmurs, rubs, or gallops  ABDOMEN: Soft, Nontender, Nondistended; Bowel sounds present  EXTREMITIES:   No edema  NEUROLOGY: AAO X 3      LABS:                        12.7   6.56  )-----------( 255      ( 22 Aug 2019 08:15 )             39.6     08-22    133<L>  |  92<L>  |  22  ----------------------------<  96  4.1   |  30  |  1.31<H>    Ca    9.1      22 Aug 2019 06:43      PT/INR - ( 22 Aug 2019 06:43 )   PT: 11.6 sec;   INR: 1.02 ratio         PTT - ( 22 Aug 2019 20:43 )  PTT:90.1 sec        CAPILLARY BLOOD GLUCOSE                    RADIOLOGY & ADDITIONAL TESTS:    Imaging Personally Reviewed:    Consultant(s) Notes Reviewed:      Care Discussed with Consultants/Other Providers:

## 2019-08-23 LAB
ANION GAP SERPL CALC-SCNC: 11 MMOL/L — SIGNIFICANT CHANGE UP (ref 5–17)
APTT BLD: 80 SEC — HIGH (ref 27.5–36.3)
BUN SERPL-MCNC: 24 MG/DL — HIGH (ref 7–23)
CALCIUM SERPL-MCNC: 8.7 MG/DL — SIGNIFICANT CHANGE UP (ref 8.4–10.5)
CHLORIDE SERPL-SCNC: 93 MMOL/L — LOW (ref 96–108)
CO2 SERPL-SCNC: 30 MMOL/L — SIGNIFICANT CHANGE UP (ref 22–31)
CREAT SERPL-MCNC: 1.29 MG/DL — SIGNIFICANT CHANGE UP (ref 0.5–1.3)
GLUCOSE SERPL-MCNC: 113 MG/DL — HIGH (ref 70–99)
HCT VFR BLD CALC: 41.2 % — SIGNIFICANT CHANGE UP (ref 39–50)
HGB BLD-MCNC: 12.7 G/DL — LOW (ref 13–17)
INR BLD: 1.08 RATIO — SIGNIFICANT CHANGE UP (ref 0.88–1.16)
MCHC RBC-ENTMCNC: 28.5 PG — SIGNIFICANT CHANGE UP (ref 27–34)
MCHC RBC-ENTMCNC: 30.8 GM/DL — LOW (ref 32–36)
MCV RBC AUTO: 92.6 FL — SIGNIFICANT CHANGE UP (ref 80–100)
PLATELET # BLD AUTO: 265 K/UL — SIGNIFICANT CHANGE UP (ref 150–400)
POTASSIUM SERPL-MCNC: 4.5 MMOL/L — SIGNIFICANT CHANGE UP (ref 3.5–5.3)
POTASSIUM SERPL-SCNC: 4.5 MMOL/L — SIGNIFICANT CHANGE UP (ref 3.5–5.3)
PROTHROM AB SERPL-ACNC: 12.5 SEC — SIGNIFICANT CHANGE UP (ref 10–12.9)
RBC # BLD: 4.45 M/UL — SIGNIFICANT CHANGE UP (ref 4.2–5.8)
RBC # FLD: 13.6 % — SIGNIFICANT CHANGE UP (ref 10.3–14.5)
SODIUM SERPL-SCNC: 134 MMOL/L — LOW (ref 135–145)
WBC # BLD: 6.05 K/UL — SIGNIFICANT CHANGE UP (ref 3.8–10.5)
WBC # FLD AUTO: 6.05 K/UL — SIGNIFICANT CHANGE UP (ref 3.8–10.5)

## 2019-08-23 RX ORDER — WARFARIN SODIUM 2.5 MG/1
7 TABLET ORAL ONCE
Refills: 0 | Status: COMPLETED | OUTPATIENT
Start: 2019-08-23 | End: 2019-08-23

## 2019-08-23 RX ADMIN — WARFARIN SODIUM 7 MILLIGRAM(S): 2.5 TABLET ORAL at 21:27

## 2019-08-23 RX ADMIN — Medication 60 MILLIGRAM(S): at 05:45

## 2019-08-23 RX ADMIN — HEPARIN SODIUM 700 UNIT(S)/HR: 5000 INJECTION INTRAVENOUS; SUBCUTANEOUS at 07:16

## 2019-08-23 RX ADMIN — Medication 3 MILLIGRAM(S): at 21:27

## 2019-08-23 RX ADMIN — CARVEDILOL PHOSPHATE 6.25 MILLIGRAM(S): 80 CAPSULE, EXTENDED RELEASE ORAL at 05:44

## 2019-08-23 RX ADMIN — CARVEDILOL PHOSPHATE 6.25 MILLIGRAM(S): 80 CAPSULE, EXTENDED RELEASE ORAL at 17:15

## 2019-08-23 NOTE — DIETITIAN INITIAL EVALUATION ADULT. - REASON INDICATOR FOR ASSESSMENT
Pt seen for LOS initial assessment. Source: EMR, patient (pt AAO, but very [place], able to read and communicate in English)

## 2019-08-23 NOTE — DIETITIAN INITIAL EVALUATION ADULT. - PROBLEM SELECTOR PLAN 2
Patient noted to have elevated SCr, potentially  related to renal vascular congestion.  Continue diuresis as noted above.  Monitor SCr.  When renal function normalizes, pt would  benefit from being started on an ACEI.

## 2019-08-23 NOTE — DIETITIAN INITIAL EVALUATION ADULT. - OTHER INFO
Pt visiting from [place], reports appetite and po intakes were good PTA. Pt does endorse difficulty breathing PTA and may have had decreased po intakes 2/2 symptoms. Pt reports good appetite, but prefers ethic cuisine brought from home, noted 50% po intakes documented per flowsheet (food from home). Pt states he gained some 'water wt' 2/2 not taking lasix for 8 weeks PTA, but does not recall UBW. Pt denies unintended wt loss PTA. Noted pt with dosing wt 126 pounds, s/p IV Lasix now current wt is 114.1 pounds. Pt is a lacto vegetarian, NKFA. Pt denies GI distress, +BM today. Pt missing some teeth, but denies chewing/swallowing difficulties.

## 2019-08-23 NOTE — DIETITIAN INITIAL EVALUATION ADULT. - PERTINENT LABORATORY DATA
Na 134 [135 - 145], K+ 4.5 [3.5 - 5.3], BUN 24 [7 - 23], Cr 1.29 [0.50 - 1.30],  [70 - 99], Phos --, Alk Phos --, AST --, ALT --, Mg --, Ca 8.7 [8.4 - 10.5], HbA1c --

## 2019-08-23 NOTE — PROGRESS NOTE ADULT - SUBJECTIVE AND OBJECTIVE BOX
Patient is a 89y old  Male who presents with a chief complaint of dyspnea (22 Aug 2019 10:22)      SUBJECTIVE / OVERNIGHT EVENTS:    Events noted.  CONSTITUTIONAL: No fever,  or fatigue  RESPIRATORY: No cough, wheezing,  No shortness of breath  CARDIOVASCULAR: No chest pain, palpitations,   GASTROINTESTINAL: No abdominal or epigastric pain.  NEUROLOGICAL: No headaches,     MEDICATIONS  (STANDING):  carvedilol 6.25 milliGRAM(s) Oral every 12 hours  furosemide    Tablet 60 milliGRAM(s) Oral daily  heparin  Infusion.  Unit(s)/Hr (11 mL/Hr) IV Continuous <Continuous>    MEDICATIONS  (PRN):  heparin  Injectable 4500 Unit(s) IV Push every 6 hours PRN For aPTT less than 40  heparin  Injectable 2000 Unit(s) IV Push every 6 hours PRN For aPTT between 40 - 57  melatonin 3 milliGRAM(s) Oral at bedtime PRN Insomnia        CAPILLARY BLOOD GLUCOSE        I&O's Summary    21 Aug 2019 07:01  -  22 Aug 2019 07:00  --------------------------------------------------------  IN: 870 mL / OUT: 400 mL / NET: 470 mL    22 Aug 2019 07:01  -  23 Aug 2019 00:04  --------------------------------------------------------  IN: 240 mL / OUT: 0 mL / NET: 240 mL        PHYSICAL EXAM:    NECK: Supple, No JVD  CHEST/LUNG: Clear to auscultation bilaterally; No wheezing.  HEART: Regular rate and rhythm; No murmurs, rubs, or gallops  ABDOMEN: Soft, Nontender, Nondistended; Bowel sounds present  EXTREMITIES:   Pedal edema  NEUROLOGY: AAO X 3      LABS:                        12.7   6.56  )-----------( 255      ( 22 Aug 2019 08:15 )             39.6     08-22    133<L>  |  92<L>  |  22  ----------------------------<  96  4.1   |  30  |  1.31<H>    Ca    9.1      22 Aug 2019 06:43      PT/INR - ( 22 Aug 2019 06:43 )   PT: 11.6 sec;   INR: 1.02 ratio         PTT - ( 22 Aug 2019 20:43 )  PTT:90.1 sec        CAPILLARY BLOOD GLUCOSE                    RADIOLOGY & ADDITIONAL TESTS:    Imaging Personally Reviewed:    Consultant(s) Notes Reviewed:      Care Discussed with Consultants/Other Providers:

## 2019-08-23 NOTE — DIETITIAN INITIAL EVALUATION ADULT. - PERTINENT MEDS FT
MEDICATIONS  (STANDING):  carvedilol 6.25 milliGRAM(s) Oral every 12 hours  furosemide    Tablet 60 milliGRAM(s) Oral daily  heparin  Infusion.  Unit(s)/Hr (11 mL/Hr) IV Continuous <Continuous>    MEDICATIONS  (PRN):  heparin  Injectable 4500 Unit(s) IV Push every 6 hours PRN For aPTT less than 40  heparin  Injectable 2000 Unit(s) IV Push every 6 hours PRN For aPTT between 40 - 57  melatonin 3 milliGRAM(s) Oral at bedtime PRN Insomnia

## 2019-08-23 NOTE — DIETITIAN INITIAL EVALUATION ADULT. - ADD RECOMMEND
1) Continue to monitor weight, lab values, and diet tolerance. 2) Coumadin nutrition education provided. Including: Coumadin/Vitamin K interaction, vitamin k points and serving sizes, consistent vitamin K intake. Provided Coumadin education booklet. Discussed importance of avoiding high sodium foods/using salt when cooking.

## 2019-08-23 NOTE — DIETITIAN INITIAL EVALUATION ADULT. - ENERGY NEEDS
Height: 61 inches, Weight: 114.1 pounds  BMI: 21.6 kg/m2 IBW: 112 pounds (+/-10%), %IBW: 102%  Pertinent Info: Per chart, 89 y/o male with PMH of prostate cancer and PPM, visiting from [facility], [facility], admitted with acute heart failure and PPM lead thrombus started on Coumadin x 6 weeks, s/p SLP swallow evaluation recommended for Regular consistency (8/20/2019). +1 bilateral ankles edema, no pressure ulcers noted at this time.

## 2019-08-23 NOTE — DIETITIAN INITIAL EVALUATION ADULT. - PHYSICAL APPEARANCE
Pt declined nutrition focused physical exam, feeling cold and covered in blanket head to toe. No severe wasting noted in face visually and looks age appropriate

## 2019-08-23 NOTE — DIETITIAN INITIAL EVALUATION ADULT. - PROBLEM SELECTOR PLAN 1
Patient has TRUONG, crackles bilaterally, with CXR of pulmonary edema, pleural effusions and elevated proBNP indicative of heart failure exacerbation.   Monitor on telemetry  Check transthoracic echocardiogram.  Start lasix 40mg IVP q8h. Monitor strict I&Os and daily weights. Check BMP and replete electrolytes q12.  Monitor renal function.   Obtain additional documents regarding patient's cardiac history. It is unclear if heart failure is chronic or acute. Patient denies history of MI. Patient may require inpatient ischemic workup if this had not been performed previously.  Check troponin    Follow up final CXR read

## 2019-08-24 LAB
ANION GAP SERPL CALC-SCNC: 13 MMOL/L — SIGNIFICANT CHANGE UP (ref 5–17)
APTT BLD: 66.5 SEC — HIGH (ref 27.5–36.3)
BUN SERPL-MCNC: 27 MG/DL — HIGH (ref 7–23)
CALCIUM SERPL-MCNC: 8.5 MG/DL — SIGNIFICANT CHANGE UP (ref 8.4–10.5)
CHLORIDE SERPL-SCNC: 93 MMOL/L — LOW (ref 96–108)
CO2 SERPL-SCNC: 27 MMOL/L — SIGNIFICANT CHANGE UP (ref 22–31)
CREAT SERPL-MCNC: 1.25 MG/DL — SIGNIFICANT CHANGE UP (ref 0.5–1.3)
GLUCOSE SERPL-MCNC: 109 MG/DL — HIGH (ref 70–99)
HCT VFR BLD CALC: 38.7 % — LOW (ref 39–50)
HGB BLD-MCNC: 11.9 G/DL — LOW (ref 13–17)
INR BLD: 1.38 RATIO — HIGH (ref 0.88–1.16)
MCHC RBC-ENTMCNC: 28.4 PG — SIGNIFICANT CHANGE UP (ref 27–34)
MCHC RBC-ENTMCNC: 30.7 GM/DL — LOW (ref 32–36)
MCV RBC AUTO: 92.4 FL — SIGNIFICANT CHANGE UP (ref 80–100)
PLATELET # BLD AUTO: 237 K/UL — SIGNIFICANT CHANGE UP (ref 150–400)
POTASSIUM SERPL-MCNC: 4.5 MMOL/L — SIGNIFICANT CHANGE UP (ref 3.5–5.3)
POTASSIUM SERPL-SCNC: 4.5 MMOL/L — SIGNIFICANT CHANGE UP (ref 3.5–5.3)
PROTHROM AB SERPL-ACNC: 15.9 SEC — HIGH (ref 10–13.1)
RBC # BLD: 4.19 M/UL — LOW (ref 4.2–5.8)
RBC # FLD: 13.5 % — SIGNIFICANT CHANGE UP (ref 10.3–14.5)
SODIUM SERPL-SCNC: 133 MMOL/L — LOW (ref 135–145)
WBC # BLD: 5.49 K/UL — SIGNIFICANT CHANGE UP (ref 3.8–10.5)
WBC # FLD AUTO: 5.49 K/UL — SIGNIFICANT CHANGE UP (ref 3.8–10.5)

## 2019-08-24 RX ORDER — WARFARIN SODIUM 2.5 MG/1
7 TABLET ORAL ONCE
Refills: 0 | Status: COMPLETED | OUTPATIENT
Start: 2019-08-24 | End: 2019-08-24

## 2019-08-24 RX ADMIN — WARFARIN SODIUM 7 MILLIGRAM(S): 2.5 TABLET ORAL at 21:01

## 2019-08-24 RX ADMIN — CARVEDILOL PHOSPHATE 6.25 MILLIGRAM(S): 80 CAPSULE, EXTENDED RELEASE ORAL at 17:30

## 2019-08-24 RX ADMIN — Medication 60 MILLIGRAM(S): at 05:47

## 2019-08-24 RX ADMIN — CARVEDILOL PHOSPHATE 6.25 MILLIGRAM(S): 80 CAPSULE, EXTENDED RELEASE ORAL at 05:47

## 2019-08-24 NOTE — PROGRESS NOTE ADULT - SUBJECTIVE AND OBJECTIVE BOX
Patient is a 89y old  Male who presents with a chief complaint of dyspnea (23 Aug 2019 16:50)      INTERVAL HPI/OVERNIGHT EVENTS:  T(C): 36.6 (08-24-19 @ 14:27), Max: 36.7 (08-23-19 @ 21:03)  HR: 63 (08-24-19 @ 14:27) (60 - 63)  BP: 126/69 (08-24-19 @ 14:27) (116/67 - 130/66)  RR: 18 (08-24-19 @ 14:27) (18 - 18)  SpO2: 96% (08-24-19 @ 14:27) (96% - 100%)  Wt(kg): --  I&O's Summary    23 Aug 2019 07:01  -  24 Aug 2019 07:00  --------------------------------------------------------  IN: 904 mL / OUT: 0 mL / NET: 904 mL        LABS:                        11.9   5.49  )-----------( 237      ( 24 Aug 2019 10:23 )             38.7     08-24    133<L>  |  93<L>  |  27<H>  ----------------------------<  109<H>  4.5   |  27  |  1.25    Ca    8.5      24 Aug 2019 06:44      PT/INR - ( 24 Aug 2019 09:22 )   PT: 15.9 sec;   INR: 1.38 ratio         PTT - ( 24 Aug 2019 09:22 )  PTT:66.5 sec    CAPILLARY BLOOD GLUCOSE                MEDICATIONS  (STANDING):  carvedilol 6.25 milliGRAM(s) Oral every 12 hours  furosemide    Tablet 60 milliGRAM(s) Oral daily  heparin  Infusion.  Unit(s)/Hr (11 mL/Hr) IV Continuous <Continuous>  warfarin 7 milliGRAM(s) Oral once    MEDICATIONS  (PRN):  heparin  Injectable 4500 Unit(s) IV Push every 6 hours PRN For aPTT less than 40  heparin  Injectable 2000 Unit(s) IV Push every 6 hours PRN For aPTT between 40 - 57  melatonin 3 milliGRAM(s) Oral at bedtime PRN Insomnia          PHYSICAL EXAM:  GENERAL: NAD, well-groomed, well-developed  HEAD:  Atraumatic, Normocephalic  CHEST/LUNG: Clear to percussion bilaterally; No rales, rhonchi, wheezing, or rubs  HEART: Regular rate and rhythm; No murmurs, rubs, or gallops  ABDOMEN: Soft, Nontender, Nondistended; Bowel sounds present  EXTREMITIES:  2+ Peripheral Pulses, No clubbing, cyanosis, or edema  LYMPH: No lymphadenopathy noted  SKIN: No rashes or lesions    Care Discussed with Consultants/Other Providers [ ] YES  [ ] NO

## 2019-08-25 LAB
HCT VFR BLD CALC: 35.5 % — LOW (ref 39–50)
HGB BLD-MCNC: 11.2 G/DL — LOW (ref 13–17)
INR BLD: 1.66 RATIO — HIGH (ref 0.88–1.16)
MCHC RBC-ENTMCNC: 29.4 PG — SIGNIFICANT CHANGE UP (ref 27–34)
MCHC RBC-ENTMCNC: 31.5 GM/DL — LOW (ref 32–36)
MCV RBC AUTO: 93.2 FL — SIGNIFICANT CHANGE UP (ref 80–100)
PLATELET # BLD AUTO: 230 K/UL — SIGNIFICANT CHANGE UP (ref 150–400)
PROTHROM AB SERPL-ACNC: 19.4 SEC — HIGH (ref 10–13.1)
RBC # BLD: 3.81 M/UL — LOW (ref 4.2–5.8)
RBC # FLD: 13.6 % — SIGNIFICANT CHANGE UP (ref 10.3–14.5)
WBC # BLD: 5.84 K/UL — SIGNIFICANT CHANGE UP (ref 3.8–10.5)
WBC # FLD AUTO: 5.84 K/UL — SIGNIFICANT CHANGE UP (ref 3.8–10.5)

## 2019-08-25 RX ORDER — WARFARIN SODIUM 2.5 MG/1
7 TABLET ORAL ONCE
Refills: 0 | Status: COMPLETED | OUTPATIENT
Start: 2019-08-25 | End: 2019-08-25

## 2019-08-25 RX ADMIN — CARVEDILOL PHOSPHATE 6.25 MILLIGRAM(S): 80 CAPSULE, EXTENDED RELEASE ORAL at 17:29

## 2019-08-25 RX ADMIN — Medication 3 MILLIGRAM(S): at 21:04

## 2019-08-25 RX ADMIN — CARVEDILOL PHOSPHATE 6.25 MILLIGRAM(S): 80 CAPSULE, EXTENDED RELEASE ORAL at 05:35

## 2019-08-25 RX ADMIN — WARFARIN SODIUM 7 MILLIGRAM(S): 2.5 TABLET ORAL at 21:05

## 2019-08-25 RX ADMIN — Medication 60 MILLIGRAM(S): at 05:35

## 2019-08-25 NOTE — PROGRESS NOTE ADULT - SUBJECTIVE AND OBJECTIVE BOX
Patient is a 89y old  Male who presents with a chief complaint of dyspnea (24 Aug 2019 16:58)      INTERVAL HPI/OVERNIGHT EVENTS:  T(C): 37.2 (08-25-19 @ 17:05), Max: 37.2 (08-25-19 @ 17:05)  HR: 63 (08-25-19 @ 17:05) (60 - 67)  BP: 112/59 (08-25-19 @ 17:05) (112/59 - 131/73)  RR: 18 (08-25-19 @ 17:05) (18 - 18)  SpO2: 96% (08-25-19 @ 17:05) (96% - 99%)  Wt(kg): --  I&O's Summary    24 Aug 2019 07:01  -  25 Aug 2019 07:00  --------------------------------------------------------  IN: 364 mL / OUT: 50 mL / NET: 314 mL    25 Aug 2019 07:01  -  25 Aug 2019 17:54  --------------------------------------------------------  IN: 261 mL / OUT: 0 mL / NET: 261 mL        LABS:                        11.2   5.84  )-----------( 230      ( 25 Aug 2019 08:43 )             35.5     08-24    133<L>  |  93<L>  |  27<H>  ----------------------------<  109<H>  4.5   |  27  |  1.25    Ca    8.5      24 Aug 2019 06:44      PT/INR - ( 25 Aug 2019 08:33 )   PT: 19.4 sec;   INR: 1.66 ratio         PTT - ( 24 Aug 2019 09:22 )  PTT:66.5 sec    CAPILLARY BLOOD GLUCOSE                MEDICATIONS  (STANDING):  carvedilol 6.25 milliGRAM(s) Oral every 12 hours  furosemide    Tablet 60 milliGRAM(s) Oral daily  heparin  Infusion.  Unit(s)/Hr (11 mL/Hr) IV Continuous <Continuous>  warfarin 7 milliGRAM(s) Oral once    MEDICATIONS  (PRN):  heparin  Injectable 4500 Unit(s) IV Push every 6 hours PRN For aPTT less than 40  heparin  Injectable 2000 Unit(s) IV Push every 6 hours PRN For aPTT between 40 - 57  melatonin 3 milliGRAM(s) Oral at bedtime PRN Insomnia          PHYSICAL EXAM:  GENERAL: NAD, well-groomed, well-developed  HEAD:  Atraumatic, Normocephalic  CHEST/LUNG: Clear to percussion bilaterally; No rales, rhonchi, wheezing, or rubs  HEART: Regular rate and rhythm; No murmurs, rubs, or gallops  ABDOMEN: Soft, Nontender, Nondistended; Bowel sounds present  EXTREMITIES:  2+ Peripheral Pulses, No clubbing, cyanosis, or edema  LYMPH: No lymphadenopathy noted  SKIN: No rashes or lesions    Care Discussed with Consultants/Other Providers [ ] YES  [ ] NO

## 2019-08-26 LAB
APTT BLD: 74.5 SEC — HIGH (ref 27.5–36.3)
HCT VFR BLD CALC: 37.5 % — LOW (ref 39–50)
HGB BLD-MCNC: 11.8 G/DL — LOW (ref 13–17)
INR BLD: 1.91 RATIO — HIGH (ref 0.88–1.16)
MCHC RBC-ENTMCNC: 29.1 PG — SIGNIFICANT CHANGE UP (ref 27–34)
MCHC RBC-ENTMCNC: 31.5 GM/DL — LOW (ref 32–36)
MCV RBC AUTO: 92.6 FL — SIGNIFICANT CHANGE UP (ref 80–100)
PLATELET # BLD AUTO: 218 K/UL — SIGNIFICANT CHANGE UP (ref 150–400)
PROTHROM AB SERPL-ACNC: 22.2 SEC — HIGH (ref 10–13.1)
RBC # BLD: 4.05 M/UL — LOW (ref 4.2–5.8)
RBC # FLD: 13.5 % — SIGNIFICANT CHANGE UP (ref 10.3–14.5)
WBC # BLD: 5.27 K/UL — SIGNIFICANT CHANGE UP (ref 3.8–10.5)
WBC # FLD AUTO: 5.27 K/UL — SIGNIFICANT CHANGE UP (ref 3.8–10.5)

## 2019-08-26 RX ORDER — WARFARIN SODIUM 2.5 MG/1
7 TABLET ORAL ONCE
Refills: 0 | Status: COMPLETED | OUTPATIENT
Start: 2019-08-26 | End: 2019-08-26

## 2019-08-26 RX ADMIN — CARVEDILOL PHOSPHATE 6.25 MILLIGRAM(S): 80 CAPSULE, EXTENDED RELEASE ORAL at 17:45

## 2019-08-26 RX ADMIN — HEPARIN SODIUM 700 UNIT(S)/HR: 5000 INJECTION INTRAVENOUS; SUBCUTANEOUS at 17:52

## 2019-08-26 RX ADMIN — CARVEDILOL PHOSPHATE 6.25 MILLIGRAM(S): 80 CAPSULE, EXTENDED RELEASE ORAL at 05:42

## 2019-08-26 RX ADMIN — WARFARIN SODIUM 7 MILLIGRAM(S): 2.5 TABLET ORAL at 21:28

## 2019-08-26 RX ADMIN — Medication 60 MILLIGRAM(S): at 05:42

## 2019-08-26 RX ADMIN — Medication 3 MILLIGRAM(S): at 21:50

## 2019-08-26 NOTE — PROGRESS NOTE ADULT - SUBJECTIVE AND OBJECTIVE BOX
Patient is a 89y old  Male who presents with a chief complaint of dyspnea (25 Aug 2019 17:54)      INTERVAL HPI/OVERNIGHT EVENTS:  T(C): 36.9 (08-26-19 @ 17:06), Max: 36.9 (08-26-19 @ 09:07)  HR: 63 (08-26-19 @ 17:06) (59 - 63)  BP: 139/99 (08-26-19 @ 17:06) (113/55 - 139/99)  RR: 20 (08-26-19 @ 17:06) (18 - 20)  SpO2: 100% (08-26-19 @ 17:06) (94% - 100%)  Wt(kg): --  I&O's Summary    25 Aug 2019 07:01  -  26 Aug 2019 07:00  --------------------------------------------------------  IN: 500 mL / OUT: 0 mL / NET: 500 mL    26 Aug 2019 07:01  -  26 Aug 2019 17:14  --------------------------------------------------------  IN: 550 mL / OUT: 0 mL / NET: 550 mL        LABS:                        11.8   5.27  )-----------( 218      ( 26 Aug 2019 08:50 )             37.5           PT/INR - ( 26 Aug 2019 09:42 )   PT: 22.2 sec;   INR: 1.91 ratio         PTT - ( 26 Aug 2019 09:42 )  PTT:74.5 sec    CAPILLARY BLOOD GLUCOSE                MEDICATIONS  (STANDING):  carvedilol 6.25 milliGRAM(s) Oral every 12 hours  furosemide    Tablet 60 milliGRAM(s) Oral daily  heparin  Infusion.  Unit(s)/Hr (11 mL/Hr) IV Continuous <Continuous>    MEDICATIONS  (PRN):  heparin  Injectable 4500 Unit(s) IV Push every 6 hours PRN For aPTT less than 40  heparin  Injectable 2000 Unit(s) IV Push every 6 hours PRN For aPTT between 40 - 57  melatonin 3 milliGRAM(s) Oral at bedtime PRN Insomnia          PHYSICAL EXAM:  GENERAL: NAD, well-groomed, well-developed  HEAD:  Atraumatic, Normocephalic  CHEST/LUNG: Clear to percussion bilaterally; No rales, rhonchi, wheezing, or rubs  HEART: Regular rate and rhythm; No murmurs, rubs, or gallops  ABDOMEN: Soft, Nontender, Nondistended; Bowel sounds present  EXTREMITIES:  2+ Peripheral Pulses, No clubbing, cyanosis, or edema  LYMPH: No lymphadenopathy noted  SKIN: No rashes or lesions    Care Discussed with Consultants/Other Providers [ ] YES  [ ] NO

## 2019-08-27 VITALS
RESPIRATION RATE: 20 BRPM | OXYGEN SATURATION: 95 % | TEMPERATURE: 98 F | HEART RATE: 69 BPM | DIASTOLIC BLOOD PRESSURE: 53 MMHG | SYSTOLIC BLOOD PRESSURE: 110 MMHG

## 2019-08-27 LAB
ANION GAP SERPL CALC-SCNC: 11 MMOL/L — SIGNIFICANT CHANGE UP (ref 5–17)
APTT BLD: 81.3 SEC — HIGH (ref 27.5–36.3)
BUN SERPL-MCNC: 29 MG/DL — HIGH (ref 7–23)
CALCIUM SERPL-MCNC: 9.2 MG/DL — SIGNIFICANT CHANGE UP (ref 8.4–10.5)
CHLORIDE SERPL-SCNC: 97 MMOL/L — SIGNIFICANT CHANGE UP (ref 96–108)
CO2 SERPL-SCNC: 27 MMOL/L — SIGNIFICANT CHANGE UP (ref 22–31)
CREAT SERPL-MCNC: 1.28 MG/DL — SIGNIFICANT CHANGE UP (ref 0.5–1.3)
GLUCOSE SERPL-MCNC: 100 MG/DL — HIGH (ref 70–99)
HCT VFR BLD CALC: 38.3 % — LOW (ref 39–50)
HGB BLD-MCNC: 11.8 G/DL — LOW (ref 13–17)
INR BLD: 2.16 RATIO — HIGH (ref 0.88–1.16)
MCHC RBC-ENTMCNC: 29 PG — SIGNIFICANT CHANGE UP (ref 27–34)
MCHC RBC-ENTMCNC: 30.8 GM/DL — LOW (ref 32–36)
MCV RBC AUTO: 94.1 FL — SIGNIFICANT CHANGE UP (ref 80–100)
PLATELET # BLD AUTO: 214 K/UL — SIGNIFICANT CHANGE UP (ref 150–400)
POTASSIUM SERPL-MCNC: 4.6 MMOL/L — SIGNIFICANT CHANGE UP (ref 3.5–5.3)
POTASSIUM SERPL-SCNC: 4.6 MMOL/L — SIGNIFICANT CHANGE UP (ref 3.5–5.3)
PROTHROM AB SERPL-ACNC: 25 SEC — HIGH (ref 10–13.1)
RBC # BLD: 4.07 M/UL — LOW (ref 4.2–5.8)
RBC # FLD: 13.5 % — SIGNIFICANT CHANGE UP (ref 10.3–14.5)
SODIUM SERPL-SCNC: 135 MMOL/L — SIGNIFICANT CHANGE UP (ref 135–145)
WBC # BLD: 5.68 K/UL — SIGNIFICANT CHANGE UP (ref 3.8–10.5)
WBC # FLD AUTO: 5.68 K/UL — SIGNIFICANT CHANGE UP (ref 3.8–10.5)

## 2019-08-27 PROCEDURE — 83880 ASSAY OF NATRIURETIC PEPTIDE: CPT

## 2019-08-27 PROCEDURE — 83540 ASSAY OF IRON: CPT

## 2019-08-27 PROCEDURE — 96374 THER/PROPH/DIAG INJ IV PUSH: CPT

## 2019-08-27 PROCEDURE — 92610 EVALUATE SWALLOWING FUNCTION: CPT

## 2019-08-27 PROCEDURE — 83735 ASSAY OF MAGNESIUM: CPT

## 2019-08-27 PROCEDURE — 85045 AUTOMATED RETICULOCYTE COUNT: CPT

## 2019-08-27 PROCEDURE — 97161 PT EVAL LOW COMPLEX 20 MIN: CPT

## 2019-08-27 PROCEDURE — 93005 ELECTROCARDIOGRAM TRACING: CPT

## 2019-08-27 PROCEDURE — 81001 URINALYSIS AUTO W/SCOPE: CPT

## 2019-08-27 PROCEDURE — 84100 ASSAY OF PHOSPHORUS: CPT

## 2019-08-27 PROCEDURE — 82728 ASSAY OF FERRITIN: CPT

## 2019-08-27 PROCEDURE — 85610 PROTHROMBIN TIME: CPT

## 2019-08-27 PROCEDURE — 85027 COMPLETE CBC AUTOMATED: CPT

## 2019-08-27 PROCEDURE — C8929: CPT

## 2019-08-27 PROCEDURE — 85730 THROMBOPLASTIN TIME PARTIAL: CPT

## 2019-08-27 PROCEDURE — 83550 IRON BINDING TEST: CPT

## 2019-08-27 PROCEDURE — 84484 ASSAY OF TROPONIN QUANT: CPT

## 2019-08-27 PROCEDURE — 71046 X-RAY EXAM CHEST 2 VIEWS: CPT

## 2019-08-27 PROCEDURE — 99285 EMERGENCY DEPT VISIT HI MDM: CPT

## 2019-08-27 PROCEDURE — 80053 COMPREHEN METABOLIC PANEL: CPT

## 2019-08-27 PROCEDURE — 80048 BASIC METABOLIC PNL TOTAL CA: CPT

## 2019-08-27 PROCEDURE — 74177 CT ABD & PELVIS W/CONTRAST: CPT

## 2019-08-27 RX ORDER — WARFARIN SODIUM 2.5 MG/1
7 TABLET ORAL
Qty: 210 | Refills: 0
Start: 2019-08-27 | End: 2019-09-25

## 2019-08-27 RX ORDER — CARVEDILOL PHOSPHATE 80 MG/1
1 CAPSULE, EXTENDED RELEASE ORAL
Qty: 60 | Refills: 0
Start: 2019-08-27 | End: 2019-09-25

## 2019-08-27 RX ORDER — LANOLIN ALCOHOL/MO/W.PET/CERES
1 CREAM (GRAM) TOPICAL
Qty: 30 | Refills: 0
Start: 2019-08-27 | End: 2019-09-25

## 2019-08-27 RX ORDER — WARFARIN SODIUM 2.5 MG/1
7 TABLET ORAL ONCE
Refills: 0 | Status: DISCONTINUED | OUTPATIENT
Start: 2019-08-27 | End: 2019-08-27

## 2019-08-27 RX ORDER — FUROSEMIDE 40 MG
3 TABLET ORAL
Qty: 90 | Refills: 0
Start: 2019-08-27 | End: 2019-09-25

## 2019-08-27 RX ADMIN — Medication 60 MILLIGRAM(S): at 05:41

## 2019-08-27 RX ADMIN — CARVEDILOL PHOSPHATE 6.25 MILLIGRAM(S): 80 CAPSULE, EXTENDED RELEASE ORAL at 05:41

## 2019-08-27 NOTE — PROGRESS NOTE ADULT - ASSESSMENT
89 yo man (visiting from [place], speaks [place]) w/ hx of htn, prostate cancer s/p treatment, who presented with 2 weeks of dyspnea associated with progressive LLE, admitted for HF exacerbation, TTE w/ concern for lead thrombus. Cardiology consulted for management of possible pacemaker lead thrombus.     Recommendations:   #Pacemaker lead thrombus  - C/w Warfarin for 6 week trial (Goal INR 2-3)   - Repeat TTE after 6 weeks to evaluate for resolution of thrombus    #Heart failure  - C/w diuresis  - C/w carvedilol     Mark Hellerman, PGY2
89yoM with PMH of htn, prostate cancer s/p treatment, ppm (indication unknown) who presents to the ED with complaint of dyspnea and BLE edema 2/2 suspected heart failure exacerbation     Problem/Plan - 1:  ·  Problem: Acute on chronic sys heart failure:  Plan:    PO Lasix  BMP    ?Mobile mass on PPM leads:   Cardio f/up appreciated.  ECHO reviewed    Thrombosis:  CT Abd/P: No malignancy
89yoM with PMH of htn, prostate cancer s/p treatment, ppm (indication unknown) who presents to the ED with complaint of dyspnea and BLE edema 2/2 suspected heart failure exacerbation     Problem/Plan - 1:  ·  Problem: Acute on chronic sys heart failure:  Plan:    PO Lasix  BMP    ?Mobile mass on PPM leads:   Cardio f/up appreciated.  ECHO reviewed  cw coumadin    Thrombosis:  CT Abd/P: No malignancy
89yoM with PMH of htn, prostate cancer s/p treatment, ppm (indication unknown) who presents to the ED with complaint of dyspnea and BLE edema 2/2 suspected heart failure exacerbation     Problem/Plan - 1:  ·  Problem: Acute on chronic sys heart failure:  Plan:    PO Lasix  BMP    ?Mobile mass on PPM leads:   Cardio f/up appreciated.  ECHO reviewed  cw coumadin    Thrombosis:  CT Abd/P: No malignancy
89yoM with PMH of htn, prostate cancer s/p treatment, ppm (indication unknown) who presents to the ED with complaint of dyspnea and BLE edema 2/2 suspected heart failure exacerbation     Problem/Plan - 1:  ·  Problem: Acute on chronic sys heart failure:  Plan:    PO Lasix  BMP    ?Mobile mass on PPM leads:   Cardio f/up appreciated.  ECHO reviewed  cw coumadin    Thrombosis:  CT Abd/P: No malignancy    dc planning
89 yo man (visiting from [place], speaks [place]) w/ hx of htn, prostate cancer s/p treatment, who presented with 2 weeks of dyspnea associated with progressive LLE, admitted for HF exacerbation, TTE w/ concern for lead thrombus. Cardiology consulted for management of possible pacemaker lead thrombus.     Recommendations:   #Pacemaker lead thrombus  - C/w Warfarin for 6 week trial (Goal INR 2-3)   - Repeat TTE after 6 weeks to evaluate for resolution of thrombus  - No further cardiac testing for this at this time.     #ADHFrEF  - Change lasix to 60 PO daily.   - C/w carvedilol increase dose to 6.25 BID.   - Pt has mild LUZ would hold off on ACEi at this time.     Christiane Stanley MD  Cardiology Fellow - PGY 5  Text or Call: [phone]  For all New Consults and Questions:  [email or web address]   Login: [email or web address]
· Assessment	  This patient is a 89yoM with PMH of htn, prostate cancer s/p treatment, ppm (indication unknown) who presents to the ED with complaint of dyspnea and BLE edema 2/2 suspected heart failure exacerbation     Problem/Plan - 1:  ·  Problem: Acute heart failure, unspecified heart failure type.  Plan: Patient has TRUONG, crackles bilaterally, with CXR of pulmonary edema, pleural effusions and elevated proBNP indicative of heart failure exacerbation.   Monitor on telemetry  ECHO  IV Lasix       Problem/Plan - 2:  ·  Problem: LUZ (acute kidney injury).  Plan: Patient noted to have elevated SCr, potentially  related to renal vascular congestion.  Continue diuresis   Monitor SCr.  When renal function normalizes, pt would  benefit from being started on an ACEI.      Problem/Plan - 3:  ·  Problem: Hypertension.  Plan: will monitor       Problem/Plan - 4:  ·  Problem: History of prostate cancer.  Plan: Patient states that he had previously been treated and had downtrending PSA.  Patient should have regular follow up with his urologist upon discharge from the hospital.     Dw family yesterday.
· Assessment	  This patient is a 89yoM with PMH of htn, prostate cancer s/p treatment, ppm (indication unknown) who presents to the ED with complaint of dyspnea and BLE edema 2/2 suspected heart failure exacerbation     Problem/Plan - 1:  ·  Problem: Acute heart failure, unspecified heart failure type.  Plan: Patient has TRUONG, crackles bilaterally, with CXR of pulmonary edema, pleural effusions and elevated proBNP indicative of heart failure exacerbation.   Monitor on telemetry  ECHO  IV Lasix     Problem/Plan - 2:  ·  Problem: LUZ (acute kidney injury).  Plan: Patient noted to have elevated SCr, potentially  related to renal vascular congestion.  Continue diuresis   Monitor SCr.  When renal function normalizes, pt would  benefit from being started on an ACEI.      Problem/Plan - 3:  ·  Problem: Hypertension.  Plan: will monitor       Problem/Plan - 4:  ·  Problem: History of prostate cancer.  Plan: Patient states that he had previously been treated and had downtrending PSA.  Patient should have regular follow up with his urologist upon discharge from the hospital.     Dw family yesterday.
· Assessment	  This patient is a 89yoM with PMH of htn, prostate cancer s/p treatment, ppm (indication unknown) who presents to the ED with complaint of dyspnea and BLE edema 2/2 suspected heart failure exacerbation     Problem/Plan - 1:  ·  Problem: Acute on chronic sys heart failure:  Plan:    IV Lasix  BMP    ?Mobile mass on PPM leads:   Cardio eval called.  ECHO reviewed
· Assessment	  This patient is a 89yoM with PMH of htn, prostate cancer s/p treatment, ppm (indication unknown) who presents to the ED with complaint of dyspnea and BLE edema 2/2 suspected heart failure exacerbation     Problem/Plan - 1:  ·  Problem: Acute on chronic sys heart failure:  Plan:    IV Lasix  BMP    ?Mobile mass on PPM leads:   Cardio f/up appreciated.  ECHO reviewed    R/o malignancy:  CT Abd/P    Dw pt's son.
· Assessment	  This patient is a 89yoM with PMH of htn, prostate cancer s/p treatment, ppm (indication unknown) who presents to the ED with complaint of dyspnea and BLE edema 2/2 suspected heart failure exacerbation     Problem/Plan - 1:  ·  Problem: Acute on chronic sys heart failure:  Plan:    IV Lasix  BMP    ?Mobile mass on PPM leads:   Cardio f/up appreciated.  ECHO reviewed    R/o malignancy:  CT Abd/P: No malignancy
· Assessment	  This patient is a 89yoM with PMH of htn, prostate cancer s/p treatment, ppm (indication unknown) who presents to the ED with complaint of dyspnea and BLE edema 2/2 suspected heart failure exacerbation     Problem/Plan - 1:  ·  Problem: Acute on chronic sys heart failure:  Plan:    PO Lasix  BMP    ?Mobile mass on PPM leads:   Cardio f/up appreciated.  ECHO reviewed    Thrombosis:  CT Abd/P: No malignancy

## 2019-08-27 NOTE — DISCHARGE NOTE NURSING/CASE MANAGEMENT/SOCIAL WORK - PATIENT PORTAL LINK FT
You can access the FollowMyHealth Patient Portal offered by [facility] by registering at the following website: [email or web address]. By joining [facility]’s FollowMyHealth portal, you will also be able to view your health information using other applications (apps) compatible with our system.

## 2019-08-27 NOTE — DISCHARGE NOTE PROVIDER - HOSPITAL COURSE
89yoM with PMH of htn, prostate cancer s/p treatment, ppm (indication unknown) who presents to the ED with complaint of dyspnea and BLE edema 2/2 suspected heart failure exacerbation         Problem/Plan - 1:    ·  Problem: Acute on chronic sys heart failure:  Plan:      PO Lasix    BMP        ?Mobile mass on PPM leads:     Cardio f/up appreciated.    ECHO reviewed        Thrombosis:    CT Abd/P: No malignancy                Pacemaker lead thrombus    - C/w Warfarin for 6 week trial (Goal INR 2-3)     - Repeat TTE after 6 weeks to evaluate for resolution of thrombus    - No further cardiac testing for this at this time.         #ADHFrEF    - Change lasix to 60 PO daily.     - C/w carvedilol increase dose to 6.25 BID.     - Pt has mild LUZ would hold off on ACEi at this time. 89yoM with PMH of htn, prostate cancer s/p treatment, ppm (indication unknown) who presents to the ED with complaint of dyspnea and BLE edema 2/2 suspected heart failure exacerbation    Cardiology consulted, treated with IV lasix, now on PO, Coreg dose increased. ECHO revealed PPM lead thrombus. CT a/p negative for malignancy. heparin drip initiated with bridge to Coumadin. INR now therapeutic 2.16 and pt cleared for discharge.

## 2019-08-27 NOTE — DISCHARGE NOTE PROVIDER - NSDCCPCAREPLAN_GEN_ALL_CORE_FT
PRINCIPAL DISCHARGE DIAGNOSIS  Diagnosis: CHF exacerbation  Assessment and Plan of Treatment: Continue Lasix as prescribed  Continue Coreg as prescribed  Weigh yourself daily.  If you gain 3lbs in 3 days, or 5lbs in a week call your Health Care Provider.  Do not eat or drink foods containing more than 2000mg of salt (sodium) in your diet every day.  Call your Health Care Provider if you have any swelling or increased swelling in your feet, ankles, and/or stomach.  Take all of your medication as directed.  If you become dizzy call your Health Care Provider.        SECONDARY DISCHARGE DIAGNOSES  Diagnosis: Thrombus  Assessment and Plan of Treatment: Clot on pacemaker lead  Continue Coumadin as prescribed  C/w Warfarin for 6 week trial (Goal INR 2-3)   Repeat TTE after 6 weeks to evaluate for resolution of thrombus  ***********FOLLOW UP with Dr. Fisher for INR CHECK in 3 days*********    Diagnosis: Hypertension  Assessment and Plan of Treatment: Take medications as prescribed  Low salt diet  Activity as tolerated.  Take all medication as prescribed.  Follow up with your medical doctor for routine blood pressure monitoring at your next visit.  Notify your doctor if you have any of the following symptoms:   Dizziness, Lightheadedness, Blurry vision, Headache, Chest pain, Shortness of breath

## 2019-08-27 NOTE — PROGRESS NOTE ADULT - PROVIDER SPECIALTY LIST ADULT
Cardiology
Hospitalist
Internal Medicine
Cardiology

## 2019-08-27 NOTE — DISCHARGE NOTE PROVIDER - CARE PROVIDER_API CALL
Ike Fisher)  Medicine  [address], [facility]  [address]  [place] [address]  Phone: [phone]  Fax: [phone]  Follow Up Time: 1-3 days    Panda Milian)  Cardiovascular Disease; Internal Medicine  [address], [facility] [address]  [place] [address]  Phone: [phone]  Fax: [phone]  Follow Up Time: 2 weeks

## 2019-08-27 NOTE — PROGRESS NOTE ADULT - SUBJECTIVE AND OBJECTIVE BOX
Patient is a 89y old  Male who presents with a chief complaint of dyspnea (27 Aug 2019 11:42)      INTERVAL HPI/OVERNIGHT EVENTS:  T(C): 36.7 (08-27-19 @ 14:14), Max: 36.8 (08-27-19 @ 01:00)  HR: 69 (08-27-19 @ 14:14) (60 - 69)  BP: 110/53 (08-27-19 @ 14:14) (106/55 - 130/62)  RR: 20 (08-27-19 @ 14:14) (20 - 20)  SpO2: 95% (08-27-19 @ 14:14) (95% - 98%)  Wt(kg): --  I&O's Summary    26 Aug 2019 07:01  -  27 Aug 2019 07:00  --------------------------------------------------------  IN: 1248 mL / OUT: 0 mL / NET: 1248 mL    27 Aug 2019 07:01  -  27 Aug 2019 17:36  --------------------------------------------------------  IN: 543 mL / OUT: 0 mL / NET: 543 mL        LABS:                        11.8   5.68  )-----------( 214      ( 27 Aug 2019 08:46 )             38.3     08-27    135  |  97  |  29<H>  ----------------------------<  100<H>  4.6   |  27  |  1.28    Ca    9.2      27 Aug 2019 06:56      PT/INR - ( 27 Aug 2019 08:22 )   PT: 25.0 sec;   INR: 2.16 ratio         PTT - ( 27 Aug 2019 08:22 )  PTT:81.3 sec    CAPILLARY BLOOD GLUCOSE                MEDICATIONS  (STANDING):  carvedilol 6.25 milliGRAM(s) Oral every 12 hours  furosemide    Tablet 60 milliGRAM(s) Oral daily  warfarin 7 milliGRAM(s) Oral once    MEDICATIONS  (PRN):  melatonin 3 milliGRAM(s) Oral at bedtime PRN Insomnia          PHYSICAL EXAM:  GENERAL: NAD, well-groomed, well-developed  HEAD:  Atraumatic, Normocephalic  CHEST/LUNG: Clear to percussion bilaterally; No rales, rhonchi, wheezing, or rubs  HEART: Regular rate and rhythm; No murmurs, rubs, or gallops  ABDOMEN: Soft, Nontender, Nondistended; Bowel sounds present  EXTREMITIES:  2+ Peripheral Pulses, No clubbing, cyanosis, or edema  LYMPH: No lymphadenopathy noted  SKIN: No rashes or lesions    Care Discussed with Consultants/Other Providers [ ] YES  [ ] NO

## 2019-08-27 NOTE — DISCHARGE NOTE PROVIDER - PROVIDER TOKENS
PROVIDER:[TOKEN:[[id number]],FOLLOWUP:[1-3 days]],PROVIDER:[TOKEN:[[id number]],FOLLOWUP:[2 weeks]]

## 2019-09-22 ENCOUNTER — INPATIENT (INPATIENT)
Facility: HOSPITAL | Age: 84
LOS: 4 days | Discharge: HOME | DRG: 292 | End: 2019-09-27
Attending: INTERNAL MEDICINE | Admitting: INTERNAL MEDICINE
Payer: MEDICAID

## 2019-09-22 VITALS
SYSTOLIC BLOOD PRESSURE: 148 MMHG | TEMPERATURE: 98 F | HEIGHT: 64 IN | HEART RATE: 85 BPM | WEIGHT: 112.44 LBS | RESPIRATION RATE: 22 BRPM | DIASTOLIC BLOOD PRESSURE: 73 MMHG | OXYGEN SATURATION: 97 %

## 2019-09-22 DIAGNOSIS — I10 ESSENTIAL (PRIMARY) HYPERTENSION: ICD-10-CM

## 2019-09-22 DIAGNOSIS — I82.90 ACUTE EMBOLISM AND THROMBOSIS OF UNSPECIFIED VEIN: ICD-10-CM

## 2019-09-22 DIAGNOSIS — J81.0 ACUTE PULMONARY EDEMA: ICD-10-CM

## 2019-09-22 DIAGNOSIS — Z29.9 ENCOUNTER FOR PROPHYLACTIC MEASURES, UNSPECIFIED: ICD-10-CM

## 2019-09-22 DIAGNOSIS — I50.9 HEART FAILURE, UNSPECIFIED: ICD-10-CM

## 2019-09-22 DIAGNOSIS — I50.23 ACUTE ON CHRONIC SYSTOLIC (CONGESTIVE) HEART FAILURE: ICD-10-CM

## 2019-09-22 DIAGNOSIS — N17.9 ACUTE KIDNEY FAILURE, UNSPECIFIED: ICD-10-CM

## 2019-09-22 DIAGNOSIS — Z95.0 PRESENCE OF CARDIAC PACEMAKER: Chronic | ICD-10-CM

## 2019-09-22 DIAGNOSIS — D64.9 ANEMIA, UNSPECIFIED: ICD-10-CM

## 2019-09-22 LAB
ALBUMIN SERPL ELPH-MCNC: 3.8 G/DL — SIGNIFICANT CHANGE UP (ref 3.3–5)
ALP SERPL-CCNC: 64 U/L — SIGNIFICANT CHANGE UP (ref 40–120)
ALT FLD-CCNC: 13 U/L — SIGNIFICANT CHANGE UP (ref 10–45)
ANION GAP SERPL CALC-SCNC: 12 MMOL/L — SIGNIFICANT CHANGE UP (ref 5–17)
APPEARANCE UR: CLEAR — SIGNIFICANT CHANGE UP
APTT BLD: 31.3 SEC — SIGNIFICANT CHANGE UP (ref 27.5–36.3)
AST SERPL-CCNC: 25 U/L — SIGNIFICANT CHANGE UP (ref 10–40)
BACTERIA # UR AUTO: NEGATIVE — SIGNIFICANT CHANGE UP
BASE EXCESS BLDV CALC-SCNC: -1.3 MMOL/L — SIGNIFICANT CHANGE UP (ref -2–2)
BASOPHILS # BLD AUTO: 0.1 K/UL — SIGNIFICANT CHANGE UP (ref 0–0.2)
BASOPHILS NFR BLD AUTO: 0.9 % — SIGNIFICANT CHANGE UP (ref 0–2)
BILIRUB SERPL-MCNC: 0.4 MG/DL — SIGNIFICANT CHANGE UP (ref 0.2–1.2)
BILIRUB UR-MCNC: NEGATIVE — SIGNIFICANT CHANGE UP
BUN SERPL-MCNC: 40 MG/DL — HIGH (ref 7–23)
CA-I SERPL-SCNC: 1.18 MMOL/L — SIGNIFICANT CHANGE UP (ref 1.12–1.3)
CALCIUM SERPL-MCNC: 9.1 MG/DL — SIGNIFICANT CHANGE UP (ref 8.4–10.5)
CHLORIDE BLDV-SCNC: 111 MMOL/L — HIGH (ref 96–108)
CHLORIDE SERPL-SCNC: 110 MMOL/L — HIGH (ref 96–108)
CO2 BLDV-SCNC: 24 MMOL/L — SIGNIFICANT CHANGE UP (ref 22–30)
CO2 SERPL-SCNC: 20 MMOL/L — LOW (ref 22–31)
COLOR SPEC: COLORLESS — SIGNIFICANT CHANGE UP
CREAT SERPL-MCNC: 1.6 MG/DL — HIGH (ref 0.5–1.3)
DIFF PNL FLD: ABNORMAL
EOSINOPHIL # BLD AUTO: 0.2 K/UL — SIGNIFICANT CHANGE UP (ref 0–0.5)
EOSINOPHIL NFR BLD AUTO: 2.6 % — SIGNIFICANT CHANGE UP (ref 0–6)
EPI CELLS # UR: 0 /HPF — SIGNIFICANT CHANGE UP
GAS PNL BLDV: 140 MMOL/L — SIGNIFICANT CHANGE UP (ref 135–145)
GAS PNL BLDV: SIGNIFICANT CHANGE UP
GAS PNL BLDV: SIGNIFICANT CHANGE UP
GLUCOSE BLDV-MCNC: 107 MG/DL — HIGH (ref 70–99)
GLUCOSE SERPL-MCNC: 114 MG/DL — HIGH (ref 70–99)
GLUCOSE UR QL: NEGATIVE — SIGNIFICANT CHANGE UP
HCO3 BLDV-SCNC: 23 MMOL/L — SIGNIFICANT CHANGE UP (ref 21–29)
HCT VFR BLD CALC: 30.7 % — LOW (ref 39–50)
HCT VFR BLDA CALC: 31 % — LOW (ref 39–50)
HGB BLD CALC-MCNC: 10.1 G/DL — LOW (ref 13–17)
HGB BLD-MCNC: 10.4 G/DL — LOW (ref 13–17)
HYALINE CASTS # UR AUTO: 0 /LPF — SIGNIFICANT CHANGE UP (ref 0–2)
INR BLD: 1.5 RATIO — HIGH (ref 0.88–1.16)
KETONES UR-MCNC: NEGATIVE — SIGNIFICANT CHANGE UP
LACTATE BLDV-MCNC: 1.2 MMOL/L — SIGNIFICANT CHANGE UP (ref 0.7–2)
LEUKOCYTE ESTERASE UR-ACNC: NEGATIVE — SIGNIFICANT CHANGE UP
LYMPHOCYTES # BLD AUTO: 1.8 K/UL — SIGNIFICANT CHANGE UP (ref 1–3.3)
LYMPHOCYTES # BLD AUTO: 28.6 % — SIGNIFICANT CHANGE UP (ref 13–44)
MCHC RBC-ENTMCNC: 30.7 PG — SIGNIFICANT CHANGE UP (ref 27–34)
MCHC RBC-ENTMCNC: 33.7 GM/DL — SIGNIFICANT CHANGE UP (ref 32–36)
MCV RBC AUTO: 91.2 FL — SIGNIFICANT CHANGE UP (ref 80–100)
MONOCYTES # BLD AUTO: 0.7 K/UL — SIGNIFICANT CHANGE UP (ref 0–0.9)
MONOCYTES NFR BLD AUTO: 11.4 % — SIGNIFICANT CHANGE UP (ref 2–14)
NEUTROPHILS # BLD AUTO: 3.4 K/UL — SIGNIFICANT CHANGE UP (ref 1.8–7.4)
NEUTROPHILS NFR BLD AUTO: 56.5 % — SIGNIFICANT CHANGE UP (ref 43–77)
NITRITE UR-MCNC: NEGATIVE — SIGNIFICANT CHANGE UP
NT-PROBNP SERPL-SCNC: HIGH PG/ML (ref 0–300)
PCO2 BLDV: 40 MMHG — SIGNIFICANT CHANGE UP (ref 35–50)
PH BLDV: 7.38 — SIGNIFICANT CHANGE UP (ref 7.35–7.45)
PH UR: 6 — SIGNIFICANT CHANGE UP (ref 5–8)
PLATELET # BLD AUTO: 170 K/UL — SIGNIFICANT CHANGE UP (ref 150–400)
PO2 BLDV: <20 MMHG — LOW (ref 25–45)
POTASSIUM BLDV-SCNC: 5 MMOL/L — SIGNIFICANT CHANGE UP (ref 3.5–5.3)
POTASSIUM SERPL-MCNC: 5.3 MMOL/L — SIGNIFICANT CHANGE UP (ref 3.5–5.3)
POTASSIUM SERPL-SCNC: 5.3 MMOL/L — SIGNIFICANT CHANGE UP (ref 3.5–5.3)
PROT SERPL-MCNC: 6.9 G/DL — SIGNIFICANT CHANGE UP (ref 6–8.3)
PROT UR-MCNC: ABNORMAL
PROTHROM AB SERPL-ACNC: 17.5 SEC — HIGH (ref 10–12.9)
RBC # BLD: 3.37 M/UL — LOW (ref 4.2–5.8)
RBC # FLD: 12.8 % — SIGNIFICANT CHANGE UP (ref 10.3–14.5)
RBC CASTS # UR COMP ASSIST: 7 /HPF — HIGH (ref 0–4)
SAO2 % BLDV: 18 % — LOW (ref 67–88)
SODIUM SERPL-SCNC: 142 MMOL/L — SIGNIFICANT CHANGE UP (ref 135–145)
SP GR SPEC: 1.01 — LOW (ref 1.01–1.02)
TROPONIN T, HIGH SENSITIVITY RESULT: 61 NG/L — HIGH (ref 0–51)
TROPONIN T, HIGH SENSITIVITY RESULT: 63 NG/L — HIGH (ref 0–51)
UROBILINOGEN FLD QL: NEGATIVE — SIGNIFICANT CHANGE UP
WBC # BLD: 6.1 K/UL — SIGNIFICANT CHANGE UP (ref 3.8–10.5)
WBC # FLD AUTO: 6.1 K/UL — SIGNIFICANT CHANGE UP (ref 3.8–10.5)
WBC UR QL: 1 /HPF — SIGNIFICANT CHANGE UP (ref 0–5)

## 2019-09-22 PROCEDURE — 99285 EMERGENCY DEPT VISIT HI MDM: CPT

## 2019-09-22 PROCEDURE — 71045 X-RAY EXAM CHEST 1 VIEW: CPT | Mod: 26

## 2019-09-22 PROCEDURE — 99223 1ST HOSP IP/OBS HIGH 75: CPT

## 2019-09-22 PROCEDURE — 93010 ELECTROCARDIOGRAM REPORT: CPT

## 2019-09-22 RX ORDER — WARFARIN SODIUM 2.5 MG/1
7 TABLET ORAL ONCE
Refills: 0 | Status: COMPLETED | OUTPATIENT
Start: 2019-09-22 | End: 2019-09-22

## 2019-09-22 RX ORDER — CARVEDILOL PHOSPHATE 80 MG/1
6.25 CAPSULE, EXTENDED RELEASE ORAL EVERY 12 HOURS
Refills: 0 | Status: DISCONTINUED | OUTPATIENT
Start: 2019-09-22 | End: 2019-09-25

## 2019-09-22 RX ORDER — FUROSEMIDE 40 MG
80 TABLET ORAL ONCE
Refills: 0 | Status: COMPLETED | OUTPATIENT
Start: 2019-09-22 | End: 2019-09-22

## 2019-09-22 RX ORDER — HEPARIN SODIUM 5000 [USP'U]/ML
5000 INJECTION INTRAVENOUS; SUBCUTANEOUS EVERY 8 HOURS
Refills: 0 | Status: DISCONTINUED | OUTPATIENT
Start: 2019-09-22 | End: 2019-09-22

## 2019-09-22 RX ORDER — FUROSEMIDE 40 MG
80 TABLET ORAL
Refills: 0 | Status: COMPLETED | OUTPATIENT
Start: 2019-09-23 | End: 2019-09-23

## 2019-09-22 RX ADMIN — WARFARIN SODIUM 7 MILLIGRAM(S): 2.5 TABLET ORAL at 21:52

## 2019-09-22 RX ADMIN — CARVEDILOL PHOSPHATE 6.25 MILLIGRAM(S): 80 CAPSULE, EXTENDED RELEASE ORAL at 21:14

## 2019-09-22 RX ADMIN — Medication 80 MILLIGRAM(S): at 17:37

## 2019-09-22 NOTE — ED PROVIDER NOTE - PROGRESS NOTE DETAILS
Attending note (Javed): POCUS shows significantly decreased EF, some b lines (apical views a line predom) and moderate b/l pleural effusions; c/w acute CHF exacerbation.  No need for CTA jann given elevated Cr; will continue diuresis and plan for admission to medicine for further evaluation/management. discussed case with Dr. Solomon, discussed likely 2/2 fluid overload  and although much lower suspicion cannot CTA at this time to ensure no PE, may require VQ if not symptomatically improved. agrees with janice - Keke Tellez PA-C

## 2019-09-22 NOTE — ED PROVIDER NOTE - CARDIAC, MLM
Normal rate, regular rhythm.  Heart sounds S1, S2.  No murmurs, rubs or gallops. + 1+ bilateral LE edema

## 2019-09-22 NOTE — H&P ADULT - NSICDXPASTMEDICALHX_GEN_ALL_CORE_FT
PAST MEDICAL HISTORY:  BPH (benign prostatic hyperplasia)     CHF (congestive heart failure)     Hypertension     Prostate cancer

## 2019-09-22 NOTE — H&P ADULT - PROBLEM SELECTOR PLAN 3
in setting of acute HF  - reported urine output normal and urinating following IV lasix dose in ED  - monitor strict I/O, daily weight, and BMP daily. if worsening should have renal US if not responding to lasix  - avoid nephrotoxins, dose per CrCl

## 2019-09-22 NOTE — H&P ADULT - NSHPREVIEWOFSYSTEMS_GEN_ALL_CORE
CONSTITUTIONAL: No fever, no chills  EYES: No eye pain, no visual disturbance  Mouth: no pain in mouth, no cuts  RESPIRATORY: Non-productive cough, "gasping for air"  CARDIOVASCULAR: No CP, no palpitations  GASTROINTESTINAL: no abdominal pain, no n/v/d  GENITOURINARY: No dysuria, no hematuria  Heme: No easy bruising, no swelling appreciated in neck/armpit/groin  NEUROLOGICAL: No headaches, no weakness  SKIN: No itching, no rashes  MUSCULOSKELETAL: chronic b/l knee pain, no joint swelling

## 2019-09-22 NOTE — H&P ADULT - HISTORY OF PRESENT ILLNESS
Patient defers translation and requests interview in English  History also collected from patients Son Mr. 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 There are no sick contacts at home. Patient cannot remember all medications and med list confirmed with son. His son also reports that patient held medications over last 3d because he thought it would help him.    IN the ED, VS 98, 148/73, 85, 22 97%RA  s/p lasix 80g IVx1

## 2019-09-22 NOTE — H&P ADULT - ATTENDING COMMENTS
Patient assigned to me by night hospitalist in charge for management and care for patient for this evening only. Care to be resumed by day hospitalist Dr. Solomon at 08:00 in the morning and thereafter.

## 2019-09-22 NOTE — ED PROVIDER NOTE - ATTENDING CONTRIBUTION TO CARE
89y/o M with h/o multiple medical comorbidities including CHF, with recent admission for CHF exacerbation 1 month ago; possible clot on wire of PPM, on coumadin; now presenting to ED for evaluation of worsening cough, shortness of breath and leg swelling (bilateral) over the past 3 days.  No feevr/chills.  No chest pain.  SOB and cough (nonproductive) worse when lying down.      On Physical Exam:  General: elderly, chronically ill appearing but in NAD; cooperative with exam  HEENT: PERRL, MMM; + JVD  Neck: no neck tenderness, no nuchal rigidity  Cardiac: s1, s2; RRR  Lungs: diffuse rales, decreased BS at bases b/l  Abdomen: soft nontender/nondistended  : no bladder tenderness or distension  Skin: intact, no rash  Extremities: 1+ pretibial pitting edema, no gross deformities  Neuro: no gross neurologic deficits     AP: H/o CHF with worsening SOB and peripheral edema; likely acute CHF exacerbation given POCUS showing moderate b/l pleural effusions and poor systolic LV function.  Will start iv lasix; will obtain labs including trop and probnp, CXR and then plan for admission.

## 2019-09-22 NOTE — H&P ADULT - PROBLEM SELECTOR PLAN 4
continue coumadin dosing w/ goal INR 2.0-3.0  - charted hx of PPM thrombus on last admit Aug2019  repeat TTE in am

## 2019-09-22 NOTE — ED PROCEDURE NOTE - US CPT CODES
55156 US Chest (PTX, Pleural Effussion/CHF vs COPD)/76423 Echocardiography Transthoracic with Image 2D (Echo/FAST)

## 2019-09-22 NOTE — ED PROVIDER NOTE - OBJECTIVE STATEMENT
89yoM with PMH of htn, prostate cancer s/p treatment, ppm (indication unknown), chf with recent admission found to have EF of 20-30% and clot around pacer lead presenting to the ED for 2 days of SOB. patient with SOB at rest  which gets much worse with minimal exertion. no chest pain. mild LE edema which is improved since last admission. no fever or chills. + mild cough. on lasix 60 mg qd, taking 7mg coumadin with last INR check about 3 weeks ago.

## 2019-09-22 NOTE — H&P ADULT - NSHPLABSRESULTS_GEN_ALL_CORE
Personally reviewed available labs, imaging and ekg  Labs  CBC Full  -  ( 22 Sep 2019 17:43 )  WBC Count : 6.1 K/uL  RBC Count : 3.37 M/uL  Hemoglobin : 10.4 g/dL  Hematocrit : 30.7 %  Platelet Count - Automated : 170 K/uL  Mean Cell Volume : 91.2 fl  Mean Cell Hemoglobin : 30.7 pg  Mean Cell Hemoglobin Concentration : 33.7 gm/dL  Auto Neutrophil # : 3.4 K/uL  Auto Lymphocyte # : 1.8 K/uL  Auto Monocyte # : 0.7 K/uL  Auto Eosinophil # : 0.2 K/uL  Auto Basophil # : 0.1 K/uL  Auto Neutrophil % : 56.5 %  Auto Lymphocyte % : 28.6 %  Auto Monocyte % : 11.4 %  Auto Eosinophil % : 2.6 %  Auto Basophil % : 0.9 %  142  |  110<H>  |  40<H>  ----------------------------<  114<H>  5.3   |  20<L>  |  1.60<H>  Ca    9.1      22 Sep 2019 17:43  TPro  6.9  /  Alb  3.8  /  TBili  0.4  /  DBili  x   /  AST  25  /  ALT  13  /  AlkPhos  64    PT/INR - ( 22 Sep 2019 17:43 )   PT: 17.5 sec;   INR: 1.50 ratio    PTT - ( 22 Sep 2019 17:43 )  PTT:31.3 sec  Urinalysis Basic - ( 22 Sep 2019 18:50 )  Color: Colorless / Appearance: Clear / S.009 / pH: x  Gluc: x / Ketone: Negative  / Bili: Negative / Urobili: Negative   Blood: x / Protein: 30 mg/dL / Nitrite: Negative   Leuk Esterase: Negative / RBC: 7 /hpf / WBC 1 /HPF   Sq Epi: x / Non Sq Epi: 0 /hpf / Bacteria: Negative  Troponin T, High Sensitivity Result: 61:  (19 @ 17:43)  Serum Pro-Brain Natriuretic Peptide: 26362 pg/mL (19 @ 17:43)  17:43 - VBG - pH: 7.38  | pCO2: 40    | pO2: <20   | Lactate: 1.2    Imaging  CXR w/ bl pleural effusion and airspace opacification   EKG: vpaced 81bpm w/o ST elevation c/w STEMI Personally reviewed available labs, imaging and ekg  Labs  CBC Full  -  ( 22 Sep 2019 17:43 )  WBC Count : 6.1 K/uL  RBC Count : 3.37 M/uL  Hemoglobin : 10.4 g/dL  Hematocrit : 30.7 %  Platelet Count - Automated : 170 K/uL  Mean Cell Volume : 91.2 fl  Mean Cell Hemoglobin : 30.7 pg  Mean Cell Hemoglobin Concentration : 33.7 gm/dL  Auto Neutrophil # : 3.4 K/uL  Auto Lymphocyte # : 1.8 K/uL  Auto Monocyte # : 0.7 K/uL  Auto Eosinophil # : 0.2 K/uL  Auto Basophil # : 0.1 K/uL  Auto Neutrophil % : 56.5 %  Auto Lymphocyte % : 28.6 %  Auto Monocyte % : 11.4 %  Auto Eosinophil % : 2.6 %  Auto Basophil % : 0.9 %  142  |  110<H>  |  40<H>  ----------------------------<  114<H>  5.3   |  20<L>  |  1.60<H>  Ca    9.1      22 Sep 2019 17:43  TPro  6.9  /  Alb  3.8  /  TBili  0.4  /  DBili  x   /  AST  25  /  ALT  13  /  AlkPhos  64    PT/INR - ( 22 Sep 2019 17:43 )   PT: 17.5 sec;   INR: 1.50 ratio    PTT - ( 22 Sep 2019 17:43 )  PTT:31.3 sec  Urinalysis Basic - ( 22 Sep 2019 18:50 )  Color: Colorless / Appearance: Clear / S.009 / pH: x  Gluc: x / Ketone: Negative  / Bili: Negative / Urobili: Negative   Blood: x / Protein: 30 mg/dL / Nitrite: Negative   Leuk Esterase: Negative / RBC: 7 /hpf / WBC 1 /HPF   Sq Epi: x / Non Sq Epi: 0 /hpf / Bacteria: Negative  Troponin T, High Sensitivity Result: 61:  (19 @ 17:43)  Serum Pro-Brain Natriuretic Peptide: 62802 pg/mL (19 @ 17:43)  17:43 - VBG - pH: 7.38  | pCO2: 40    | pO2: <20   | Lactate: 1.2    Imaging  CXR w/ bl pleural effusion and b/l airspace opacification c/w congestion  EKG: vpaced 81bpm w/o ST elevation c/w STEMI

## 2019-09-22 NOTE — H&P ADULT - PROBLEM SELECTOR PLAN 1
2/2 acute HF  noted on CXR  s/p lasix 80mg in ED. c/w lasix 80mg IV in am  strict I/O and daily weight

## 2019-09-22 NOTE — ED PROVIDER NOTE - PRINCIPAL DIAGNOSIS
Normal vision: sees adequately in most situations; can see medication labels, newsprint CHF (congestive heart failure)

## 2019-09-22 NOTE — ED PROVIDER NOTE - PMH
BPH (benign prostatic hyperplasia)    CHF (congestive heart failure)    Hypertension    Prostate cancer

## 2019-09-22 NOTE — ED ADULT NURSE NOTE - OBJECTIVE STATEMENT
89 y.o. Male presents to the ED c/o difficulty breathing x3 days. Hx CHF - on lasix, warfarin for blood clot on wire from pacemaker, HTN. Pacemaker noted on L chest wall. +2 swelling noted b/l lower extremities. A&Ox3. Ambulatory with walker at baseline. Denies CP, N/V/D, urinary/bowel complications, fever/chills. Son at bedside. Pt is in no current distress. Comfort and safety provided. Will continue to monitor. ALEISHA Tellez at bedside for assessment.

## 2019-09-22 NOTE — H&P ADULT - NSHPPHYSICALEXAM_GEN_ALL_CORE
Vital Signs Last 24 Hrs  T(C): 36.6 (22 Sep 2019 19:11), Max: 36.8 (22 Sep 2019 17:49)  T(F): 97.9 (22 Sep 2019 19:11), Max: 98.2 (22 Sep 2019 17:49)  HR: 88 (22 Sep 2019 19:11) (80 - 88)  BP: 145/89 (22 Sep 2019 19:11) (133/91 - 148/73)  RR: 20 (22 Sep 2019 19:11) (20 - 22)  SpO2: 100% (22 Sep 2019 19:11) (97% - 100%) on room air    GENERAL: NAD, well-developed  HEAD:  Atraumatic, Normocephalic  EYES: EOMI, PERRLA, conjunctiva and sclera clear  Mouth: MMM, no lesions, poor dentition  NECK: Supple, no appreciable masses  Lung: normal work of breathing, crackles in b/l bases  Chest: S1&S2+, rrr, no m/r/g appreciated  ABDOMEN: bs+, soft, nt, nd  : No shahid catheter, no CVA tenderness  EXTREMITIES:  radial pulse present b/l, PT present b/l, pitting edema to knee b/l  Neuro: Alert and appropriate to conversation, no paralysis appreciated  SKIN: warm and dry, no visible rashes in exposed areas

## 2019-09-22 NOTE — H&P ADULT - PROBLEM SELECTOR PLAN 2
last TTE Aug2019- EF 20-25%, Moderate MR, Mild AS/AR  - pulmonary edema w/ LE swelling. etiology unclear  - pending Troponin trend, no chest pain in history and not during admit exam  - noted to not be adherent over last 3d  - lasix 80mg IV in ED, will continue with am dose and thereafter for day hospitalist to titrate. If not improving may require cardiology consultation for medication optimization.  - c/w home carvedilol dosing  - Repeat TTE given decompensated state and his of moderate MR, if worsening will need card input last TTE Aug2019- EF 20-25%, Moderate MR, Mild AS/AR  - pulmonary edema w/ LE swelling. etiology unclear  - pending Troponin trend, no chest pain in history and not during admit exam  - noted to not be adherent over last 3d  - lasix 80mg IV in ED, will continue with am dose and thereafter for day hospitalist to titrate. If not improving may require cardiology consultation for medication optimization.  - c/w home carvedilol dosing  - Repeat TTE given decompensated state and his of moderate MR, if worsening will need card input  - Tele monitoring during diuresis  -monitor in am BMP w/ lytes during diuresis and goal K to 4.0 and Mg to 2.0

## 2019-09-22 NOTE — H&P ADULT - ASSESSMENT
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

## 2019-09-23 PROBLEM — I10 ESSENTIAL (PRIMARY) HYPERTENSION: Chronic | Status: ACTIVE | Noted: 2019-08-16

## 2019-09-23 PROBLEM — N40.0 BENIGN PROSTATIC HYPERPLASIA WITHOUT LOWER URINARY TRACT SYMPTOMS: Chronic | Status: ACTIVE | Noted: 2019-08-16

## 2019-09-23 LAB
ANION GAP SERPL CALC-SCNC: 12 MMOL/L — SIGNIFICANT CHANGE UP (ref 5–17)
APTT BLD: 32.3 SEC — SIGNIFICANT CHANGE UP (ref 27.5–36.3)
BASOPHILS # BLD AUTO: 0.05 K/UL — SIGNIFICANT CHANGE UP (ref 0–0.2)
BASOPHILS NFR BLD AUTO: 0.8 % — SIGNIFICANT CHANGE UP (ref 0–2)
BUN SERPL-MCNC: 43 MG/DL — HIGH (ref 7–23)
CALCIUM SERPL-MCNC: 9.3 MG/DL — SIGNIFICANT CHANGE UP (ref 8.4–10.5)
CHLORIDE SERPL-SCNC: 107 MMOL/L — SIGNIFICANT CHANGE UP (ref 96–108)
CO2 SERPL-SCNC: 21 MMOL/L — LOW (ref 22–31)
CREAT SERPL-MCNC: 1.78 MG/DL — HIGH (ref 0.5–1.3)
EOSINOPHIL # BLD AUTO: 0.13 K/UL — SIGNIFICANT CHANGE UP (ref 0–0.5)
EOSINOPHIL NFR BLD AUTO: 2.2 % — SIGNIFICANT CHANGE UP (ref 0–6)
GLUCOSE SERPL-MCNC: 103 MG/DL — HIGH (ref 70–99)
HCT VFR BLD CALC: 33.5 % — LOW (ref 39–50)
HGB BLD-MCNC: 10.3 G/DL — LOW (ref 13–17)
IMM GRANULOCYTES NFR BLD AUTO: 0.2 % — SIGNIFICANT CHANGE UP (ref 0–1.5)
INR BLD: 1.36 RATIO — HIGH (ref 0.88–1.16)
LYMPHOCYTES # BLD AUTO: 1.58 K/UL — SIGNIFICANT CHANGE UP (ref 1–3.3)
LYMPHOCYTES # BLD AUTO: 26.6 % — SIGNIFICANT CHANGE UP (ref 13–44)
MAGNESIUM SERPL-MCNC: 2.4 MG/DL — SIGNIFICANT CHANGE UP (ref 1.6–2.6)
MCHC RBC-ENTMCNC: 28.2 PG — SIGNIFICANT CHANGE UP (ref 27–34)
MCHC RBC-ENTMCNC: 30.7 GM/DL — LOW (ref 32–36)
MCV RBC AUTO: 91.8 FL — SIGNIFICANT CHANGE UP (ref 80–100)
MONOCYTES # BLD AUTO: 0.65 K/UL — SIGNIFICANT CHANGE UP (ref 0–0.9)
MONOCYTES NFR BLD AUTO: 11 % — SIGNIFICANT CHANGE UP (ref 2–14)
NEUTROPHILS # BLD AUTO: 3.51 K/UL — SIGNIFICANT CHANGE UP (ref 1.8–7.4)
NEUTROPHILS NFR BLD AUTO: 59.2 % — SIGNIFICANT CHANGE UP (ref 43–77)
PHOSPHATE SERPL-MCNC: 3.8 MG/DL — SIGNIFICANT CHANGE UP (ref 2.5–4.5)
PLATELET # BLD AUTO: 191 K/UL — SIGNIFICANT CHANGE UP (ref 150–400)
POTASSIUM SERPL-MCNC: 4.7 MMOL/L — SIGNIFICANT CHANGE UP (ref 3.5–5.3)
POTASSIUM SERPL-SCNC: 4.7 MMOL/L — SIGNIFICANT CHANGE UP (ref 3.5–5.3)
PROTHROM AB SERPL-ACNC: 15.7 SEC — HIGH (ref 10–13.1)
RBC # BLD: 3.65 M/UL — LOW (ref 4.2–5.8)
RBC # FLD: 13.8 % — SIGNIFICANT CHANGE UP (ref 10.3–14.5)
SODIUM SERPL-SCNC: 140 MMOL/L — SIGNIFICANT CHANGE UP (ref 135–145)
WBC # BLD: 5.93 K/UL — SIGNIFICANT CHANGE UP (ref 3.8–10.5)
WBC # FLD AUTO: 5.93 K/UL — SIGNIFICANT CHANGE UP (ref 3.8–10.5)

## 2019-09-23 PROCEDURE — 76770 US EXAM ABDO BACK WALL COMP: CPT | Mod: 26

## 2019-09-23 RX ORDER — WARFARIN SODIUM 2.5 MG/1
7 TABLET ORAL ONCE
Refills: 0 | Status: COMPLETED | OUTPATIENT
Start: 2019-09-23 | End: 2019-09-23

## 2019-09-23 RX ORDER — INFLUENZA VIRUS VACCINE 15; 15; 15; 15 UG/.5ML; UG/.5ML; UG/.5ML; UG/.5ML
0.5 SUSPENSION INTRAMUSCULAR ONCE
Refills: 0 | Status: DISCONTINUED | OUTPATIENT
Start: 2019-09-23 | End: 2019-09-27

## 2019-09-23 RX ADMIN — Medication 80 MILLIGRAM(S): at 06:01

## 2019-09-23 RX ADMIN — WARFARIN SODIUM 7 MILLIGRAM(S): 2.5 TABLET ORAL at 21:51

## 2019-09-23 RX ADMIN — CARVEDILOL PHOSPHATE 6.25 MILLIGRAM(S): 80 CAPSULE, EXTENDED RELEASE ORAL at 06:01

## 2019-09-23 RX ADMIN — CARVEDILOL PHOSPHATE 6.25 MILLIGRAM(S): 80 CAPSULE, EXTENDED RELEASE ORAL at 18:16

## 2019-09-23 NOTE — PROGRESS NOTE ADULT - ASSESSMENT
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     Problem/Plan - 1:  ·  Problem: Acute pulmonary edema.  Plan: 2/2 acute HF  noted on CXR   C/w lasix 80mg IV   strict I/O and daily weight.      Problem/Plan - 2:  ·  Problem: Acute on chronic systolic heart failure.  Plan: last TTE Aug2019- EF 20-25%, Moderate MR, Mild AS/AR  - pulmonary edema w/ LE swelling.   - c/w home carvedilol dosing  - Repeat TTE given decompensated state   -Monitor BMP w/ lytes during diuresis and goal K to 4.0 and Mg to 2.0.      Problem/Plan - 3:  ·  Problem: LUZ (acute kidney injury).  Plan: in setting of acute HF  - reported urine output normal and urinating following IV lasix dose in ED  - monitor strict I/O, daily weight, and BMP daily. if worsening should have renal US if not responding to lasix  - avoid nephrotoxins, dose per CrCl.      Problem/Plan - 4:  ·  Problem: Thrombus.  Plan: continue coumadin dosing w/ goal INR 2.0-3.0  - Charted hx of PPM thrombus on last admit Aug 2019   - repeat TTE      Problem/Plan - 5:  ·  Problem: Anemia, unspecified type.  Plan: chronic  monitor w/ cbc  no signs of hemorrhage      Problem/Plan - 6:  Problem: Essential hypertension. Plan: c/w carvedilol home dosing.

## 2019-09-23 NOTE — PATIENT PROFILE ADULT - NSPROGENARRIVEDFROM_GEN_A_NUR
emergency department
Patient's first and last name, , procedure, and correct site confirmed prior to the start of procedure.

## 2019-09-24 LAB
ALBUMIN SERPL ELPH-MCNC: 3.5 G/DL — SIGNIFICANT CHANGE UP (ref 3.3–5)
ALP SERPL-CCNC: 64 U/L — SIGNIFICANT CHANGE UP (ref 40–120)
ALT FLD-CCNC: 13 U/L — SIGNIFICANT CHANGE UP (ref 10–45)
ANION GAP SERPL CALC-SCNC: 15 MMOL/L — SIGNIFICANT CHANGE UP (ref 5–17)
AST SERPL-CCNC: 26 U/L — SIGNIFICANT CHANGE UP (ref 10–40)
BILIRUB SERPL-MCNC: 0.5 MG/DL — SIGNIFICANT CHANGE UP (ref 0.2–1.2)
BUN SERPL-MCNC: 51 MG/DL — HIGH (ref 7–23)
CALCIUM SERPL-MCNC: 8.9 MG/DL — SIGNIFICANT CHANGE UP (ref 8.4–10.5)
CHLORIDE SERPL-SCNC: 104 MMOL/L — SIGNIFICANT CHANGE UP (ref 96–108)
CO2 SERPL-SCNC: 20 MMOL/L — LOW (ref 22–31)
CREAT SERPL-MCNC: 1.82 MG/DL — HIGH (ref 0.5–1.3)
GLUCOSE SERPL-MCNC: 111 MG/DL — HIGH (ref 70–99)
HCT VFR BLD CALC: 32.6 % — LOW (ref 39–50)
HGB BLD-MCNC: 10.5 G/DL — LOW (ref 13–17)
INR BLD: 1.45 RATIO — HIGH (ref 0.88–1.16)
MCHC RBC-ENTMCNC: 29.3 PG — SIGNIFICANT CHANGE UP (ref 27–34)
MCHC RBC-ENTMCNC: 32.2 GM/DL — SIGNIFICANT CHANGE UP (ref 32–36)
MCV RBC AUTO: 91.1 FL — SIGNIFICANT CHANGE UP (ref 80–100)
PLATELET # BLD AUTO: 184 K/UL — SIGNIFICANT CHANGE UP (ref 150–400)
POTASSIUM SERPL-MCNC: 4.3 MMOL/L — SIGNIFICANT CHANGE UP (ref 3.5–5.3)
POTASSIUM SERPL-SCNC: 4.3 MMOL/L — SIGNIFICANT CHANGE UP (ref 3.5–5.3)
PROT SERPL-MCNC: 6.7 G/DL — SIGNIFICANT CHANGE UP (ref 6–8.3)
PROTHROM AB SERPL-ACNC: 16.9 SEC — HIGH (ref 10–12.9)
RBC # BLD: 3.58 M/UL — LOW (ref 4.2–5.8)
RBC # FLD: 13.8 % — SIGNIFICANT CHANGE UP (ref 10.3–14.5)
SODIUM SERPL-SCNC: 139 MMOL/L — SIGNIFICANT CHANGE UP (ref 135–145)
WBC # BLD: 6.39 K/UL — SIGNIFICANT CHANGE UP (ref 3.8–10.5)
WBC # FLD AUTO: 6.39 K/UL — SIGNIFICANT CHANGE UP (ref 3.8–10.5)

## 2019-09-24 PROCEDURE — 99223 1ST HOSP IP/OBS HIGH 75: CPT

## 2019-09-24 RX ORDER — FUROSEMIDE 40 MG
80 TABLET ORAL
Refills: 0 | Status: DISCONTINUED | OUTPATIENT
Start: 2019-09-24 | End: 2019-09-26

## 2019-09-24 RX ORDER — WARFARIN SODIUM 2.5 MG/1
7.5 TABLET ORAL ONCE
Refills: 0 | Status: COMPLETED | OUTPATIENT
Start: 2019-09-24 | End: 2019-09-24

## 2019-09-24 RX ORDER — FUROSEMIDE 40 MG
80 TABLET ORAL DAILY
Refills: 0 | Status: DISCONTINUED | OUTPATIENT
Start: 2019-09-24 | End: 2019-09-24

## 2019-09-24 RX ADMIN — WARFARIN SODIUM 7.5 MILLIGRAM(S): 2.5 TABLET ORAL at 21:36

## 2019-09-24 RX ADMIN — Medication 80 MILLIGRAM(S): at 18:23

## 2019-09-24 RX ADMIN — CARVEDILOL PHOSPHATE 6.25 MILLIGRAM(S): 80 CAPSULE, EXTENDED RELEASE ORAL at 18:23

## 2019-09-24 RX ADMIN — Medication 80 MILLIGRAM(S): at 11:24

## 2019-09-24 RX ADMIN — CARVEDILOL PHOSPHATE 6.25 MILLIGRAM(S): 80 CAPSULE, EXTENDED RELEASE ORAL at 05:01

## 2019-09-24 NOTE — CONSULT NOTE ADULT - SUBJECTIVE AND OBJECTIVE BOX
**********              CARDIOLOGY CONSULT NOTE              ************  ============================================================  CHIEF COMPLAINT/REASON FOR CONSULT:  Patient is a 89y old  Male who presents with a chief complaint of 89M p/w 3d of "gasping for air" (24 Sep 2019 17:50)      HISTORY OF PRESENT ILLNESS:  89yMale with a history of Prostate CA, PPM with treatment for lead thrombus, HTN, HFrEF presenting with dyspnea, orthopnea - c/w acute decompensated systolic CHF.  Briefly, Patient reports that he started having "gasping for air" 3d ago which was associated w/ lower extremity swelling, inability to lay flat w/o chest pain palpitations, fever, chills, n/v/d, change in urination.        ===========================================================  Interval Events   -   -     ============================================================      Allergies    No Known Allergies    Intolerances    	    MEDICATIONS:  carvedilol 6.25 milliGRAM(s) Oral every 12 hours  furosemide   Injectable 80 milliGRAM(s) IV Push two times a day              influenza   Vaccine 0.5 milliLiter(s) IntraMuscular once      PAST MEDICAL & SURGICAL HISTORY:  Prostate cancer  CHF (congestive heart failure)  BPH (benign prostatic hyperplasia)  Hypertension  History of pacemaker: 2015 back in [place]      FAMILY HISTORY:  Mother - HTN      SOCIAL HISTORY:    [ x] Non-smoker  [x] No Illicit Drug Use  [ x] No Excess EtOH Use      REVIEW OF SYSTEMS: (Unless + Before Symptom, it is negative)  Constitutional: [] Fever, []Fatigue, []Weight Changes  Eyes:  []Recent Vision Changes, []Eye Pain  ENT: []Congestion, []Sore Throat  Endocrine: []Excess Sweating, []Temperature Intolerance  Cardiovascular:  []Chest Pain, []Palpitations, [+]Shortness of Breath, []Pre-syncope, []Syncope,[+] LE Swelling  Respiratory:[+] Cough, []Congestion,[] Wheezing  Gastrointestinal: [] Abdominal Pain,[] Nausea, []Vomiting  Genitourinary: []Dysuria,[] hematuria  Musculoskeletal: []Joint Pain, []Hip/Knee Injury  Neurologic: []Headaches,[] Imbalance, []Weakness  Skin: []Rashes, []hematoma, []purprura    ================================    PHYSICAL EXAM:  T(C): 36.5 (09-24-19 @ 21:45), Max: 36.6 (09-24-19 @ 00:11)  HR: 66 (09-24-19 @ 21:45) (66 - 79)  BP: 147/80 (09-24-19 @ 21:45) (126/79 - 147/80)  RR: 18 (09-24-19 @ 21:45) (18 - 18)  SpO2: 97% (09-24-19 @ 21:45) (97% - 100%)  Wt(kg): --  I&O's Summary    23 Sep 2019 07:01  -  24 Sep 2019 07:00  --------------------------------------------------------  IN: 240 mL / OUT: 0 mL / NET: 240 mL    24 Sep 2019 07:01  -  24 Sep 2019 23:19  --------------------------------------------------------  IN: 0 mL / OUT: 700 mL / NET: -700 mL      Appearance: Normal; NAD	  Psychiatry: AOx3; Normal Mood/Affect  HEENT:   Normal oral mucosa, EOMI	  Lymphatic: No lymphadenopathy  Cardiovascular: Normal S1 S2, +JVD, No murmurs, +1-2+Pitting edema b/l  Respiratory: Lungs clear to auscultation, no use of accessory muscles	  Gastrointestinal:  Soft, Non-tender	  Skin: No rashes, No ecchymoses, No cyanosis	  Neurologic: Non-focal, No Focal Deficits  Extremities: Normal range of motion, No clubbing, cyanosis  Vascular: Peripheral pulses palpable 2+ bilaterally, no prominent varicosities    ============================    LABS:	   Labs Reviewed09-24-19 @ 23:19	    CBC Full  -  ( 24 Sep 2019 09:28 )  WBC Count : 6.39 K/uL  Hemoglobin : 10.5 g/dL  Hematocrit : 32.6 %  Platelet Count - Automated : 184 K/uL  Mean Cell Volume : 91.1 fl  Mean Cell Hemoglobin : 29.3 pg  Mean Cell Hemoglobin Concentration : 32.2 gm/dL  Auto Neutrophil # : x  Auto Lymphocyte # : x  Auto Monocyte # : x  Auto Eosinophil # : x  Auto Basophil # : x  Auto Neutrophil % : x  Auto Lymphocyte % : x  Auto Monocyte % : x  Auto Eosinophil % : x  Auto Basophil % : x    09-24    139  |  104  |  51<H>  ----------------------------<  111<H>  4.3   |  20<L>  |  1.82<H>  09-23    140  |  107  |  43<H>  ----------------------------<  103<H>  4.7   |  21<L>  |  1.78<H>    Ca    8.9      24 Sep 2019 06:05  Ca    9.3      23 Sep 2019 06:11  Phos  3.8     09-23  Mg     2.4     09-23    TPro  6.7  /  Alb  3.5  /  TBili  0.5  /  DBili  x   /  AST  26  /  ALT  13  /  AlkPhos  64  09-24    < from: TTE with Doppler (w/Cont) (08.19.19 @ 13:25) >  EF (Visual Estimate): 20-25 %  Doppler Peak Velocity (m/sec): AoV=1.2  ------------------------------------------------------------------------  Observations:  Mitral Valve: Mitral annular calcification;  Tethered  mitral valve leaflets with normal opening. Moderate mitral  regurgitation.  Aortic Valve/Aorta: Calcified trileaflet aortic valve with  decreased opening. Peak transaortic valve gradient equals 6  mm Hg, mean transaortic valve gradient equals 3 mm Hg,  estimated aortic valve area equals 1.7 sqcm (by continuity  equation), aortic valve velocity time integral equals 29  cm, consistent with mild aortic stenosis. Mild aortic  regurgitation.  Aortic Root: 2.7 cm.  Left Atrium: Mildly dilated left atrium.  LA volume index =  38 cc/m2.  Left Ventricle: Severe segmental left ventricular systolic  dysfunction. The function is best preserved at basal to mid  lateral wall, basal inferolateral wal and basal anterior  wall; all remaining segments are hypokinetic. Moderate  diastolic dysfunction  Right Heart: A device wire is noted in the right heart.  There appears to be thickening on wire with a possible  mobile elements- clinical cooelation indicated.  Normal  right ventricular size with decreased right ventricular  systolic function. Tricuspid valve not well visualized.  Normal pulmonic valve. Mild-moderate pulmonic  regurgitation.  Pericardium/Pleura: Normal pericardium with no pericardial  effusion.  Hemodynamic: Estimated right atrial pressure is 8 mm Hg.  Estimated right ventricular systolic pressure equals 46 mm  Hg, assuming right atrial pressure equals 8 mm Hg,  consistent with mild pulmonary hypertension.  ------------------------------------------------------------------------  Conclusions:  1. Mitral annular calcification;  Tethered mitral valve  leaflets with normal opening. Moderate mitral  regurgitation.  2. Calcified trileaflet aortic valve with decreased  opening. Peak transaortic valve gradient equals 6 mm Hg,  mean transaortic valve gradient equals 3 mm Hg, estimated  aortic valve area equals 1.7 sqcm (by continuity equation),  aortic valve velocity time integral equals 29 cm,  consistent with mild aortic stenosis. Mild aortic  regurgitation.  3. Severe segmental left ventricular systolic dysfunction.  The function is best preserved at basal to mid lateral  wall, basal inferolateral wal and basal anterior wall; all  remaining segments are hypokinetic.  4. Moderate diastolic dysfunction  5. A device wire is noted in the right heart. There appears  to be thickening on wire with a possible mobile elements-  clinical cooelation indicated.  6. Normal right ventricular size with decreased right  ventricular systolic function.  *** No previous Echo exam.    < end of copied text >        =========================================================================  ASSESSMENT:  89yMale with a history of Prostate CA, PPM with treatment for lead thrombus, HTN, HFrEF presenting with dyspnea, orthopnea - c/w acute decompensated systolic CHF.  Still volume up with prominent JVD, crackles, LE edema  carvedilol 6.25 milliGRAM(s) Oral every 12 hours  furosemide   Injectable 80 milliGRAM(s) IV Push two times a day  influenza   Vaccine 0.5 milliLiter(s) IntraMuscular once      Recommendations  - Continue Lasix IV BID - responding well; although has 2-3 more days of diuresis likely  - Continue coreg - reduce to 3.125  - continue warfarin for PPM thrombus  - Will follow        Please call with questions.     Lewis Woods MD, [facility]  [phone]                                                                                                        Total time spent in face-to-face encounter was 80 minutes. > 50 minutes spent in counseling and coordination of care addressing all medical issues listed above. All labs, imaging, consultant reports, and any relevant outside medical records personally reviewed in order to evaluate and manage the patient medically as well as provide coordination of care amongst providers.

## 2019-09-24 NOTE — PROGRESS NOTE ADULT - ASSESSMENT
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     Problem/Plan -  ·  Problem: Acute on chronic systolic heart failure.  Plan: last TTE Aug2019- EF 20-25%, Moderate MR, Mild AS/AR  Lasix 80 mg IV BID. Cardio eval called.  - pulmonary edema w/ LE swelling.   - c/w home carvedilol dosing  - Repeat TTE given decompensated state   -Monitor BMP w/ lytes during diuresis and goal K to 4.0      Problem/Plan - 3:  ·  Problem: LUZ (acute kidney injury).  Plan: in setting of acute HF  -    Problem/Plan - 4:  ·  Problem: Thrombus.  Plan: continue coumadin dosing w/ goal INR 2.0-3.0  - Hx of PPM thrombus on last admit Aug 2019   - repeat TTE      Problem/Plan - 5:  ·  Problem: Anemia, unspecified type.  Plan: chronic  monitor w/ cbc  no signs of hemorrhage      Problem/Plan - 6:  Problem: Essential hypertension. Plan: c/w carvedilol.

## 2019-09-25 LAB
ANION GAP SERPL CALC-SCNC: 15 MMOL/L — SIGNIFICANT CHANGE UP (ref 5–17)
APTT BLD: 31.4 SEC — SIGNIFICANT CHANGE UP (ref 27.5–36.3)
BUN SERPL-MCNC: 47 MG/DL — HIGH (ref 7–23)
CALCIUM SERPL-MCNC: 8.8 MG/DL — SIGNIFICANT CHANGE UP (ref 8.4–10.5)
CHLORIDE SERPL-SCNC: 101 MMOL/L — SIGNIFICANT CHANGE UP (ref 96–108)
CO2 SERPL-SCNC: 24 MMOL/L — SIGNIFICANT CHANGE UP (ref 22–31)
CREAT SERPL-MCNC: 1.57 MG/DL — HIGH (ref 0.5–1.3)
GLUCOSE SERPL-MCNC: 103 MG/DL — HIGH (ref 70–99)
HCT VFR BLD CALC: 34.1 % — LOW (ref 39–50)
HGB BLD-MCNC: 10.6 G/DL — LOW (ref 13–17)
INR BLD: 1.61 RATIO — HIGH (ref 0.88–1.16)
MAGNESIUM SERPL-MCNC: 2.2 MG/DL — SIGNIFICANT CHANGE UP (ref 1.6–2.6)
MCHC RBC-ENTMCNC: 28.1 PG — SIGNIFICANT CHANGE UP (ref 27–34)
MCHC RBC-ENTMCNC: 31.1 GM/DL — LOW (ref 32–36)
MCV RBC AUTO: 90.5 FL — SIGNIFICANT CHANGE UP (ref 80–100)
PHOSPHATE SERPL-MCNC: 4.1 MG/DL — SIGNIFICANT CHANGE UP (ref 2.5–4.5)
PLATELET # BLD AUTO: 205 K/UL — SIGNIFICANT CHANGE UP (ref 150–400)
POTASSIUM SERPL-MCNC: 3.4 MMOL/L — LOW (ref 3.5–5.3)
POTASSIUM SERPL-SCNC: 3.4 MMOL/L — LOW (ref 3.5–5.3)
PROTHROM AB SERPL-ACNC: 18.6 SEC — HIGH (ref 10–13.1)
RBC # BLD: 3.77 M/UL — LOW (ref 4.2–5.8)
RBC # FLD: 13.7 % — SIGNIFICANT CHANGE UP (ref 10.3–14.5)
SODIUM SERPL-SCNC: 140 MMOL/L — SIGNIFICANT CHANGE UP (ref 135–145)
WBC # BLD: 5.73 K/UL — SIGNIFICANT CHANGE UP (ref 3.8–10.5)
WBC # FLD AUTO: 5.73 K/UL — SIGNIFICANT CHANGE UP (ref 3.8–10.5)

## 2019-09-25 PROCEDURE — 99233 SBSQ HOSP IP/OBS HIGH 50: CPT

## 2019-09-25 RX ORDER — POTASSIUM CHLORIDE 20 MEQ
40 PACKET (EA) ORAL ONCE
Refills: 0 | Status: COMPLETED | OUTPATIENT
Start: 2019-09-25 | End: 2019-09-25

## 2019-09-25 RX ORDER — CARVEDILOL PHOSPHATE 80 MG/1
3.12 CAPSULE, EXTENDED RELEASE ORAL EVERY 12 HOURS
Refills: 0 | Status: DISCONTINUED | OUTPATIENT
Start: 2019-09-25 | End: 2019-09-27

## 2019-09-25 RX ORDER — WARFARIN SODIUM 2.5 MG/1
7.5 TABLET ORAL ONCE
Refills: 0 | Status: COMPLETED | OUTPATIENT
Start: 2019-09-25 | End: 2019-09-25

## 2019-09-25 RX ADMIN — WARFARIN SODIUM 7.5 MILLIGRAM(S): 2.5 TABLET ORAL at 23:10

## 2019-09-25 RX ADMIN — Medication 80 MILLIGRAM(S): at 18:02

## 2019-09-25 RX ADMIN — CARVEDILOL PHOSPHATE 3.12 MILLIGRAM(S): 80 CAPSULE, EXTENDED RELEASE ORAL at 18:01

## 2019-09-25 RX ADMIN — CARVEDILOL PHOSPHATE 6.25 MILLIGRAM(S): 80 CAPSULE, EXTENDED RELEASE ORAL at 06:29

## 2019-09-25 RX ADMIN — Medication 40 MILLIEQUIVALENT(S): at 09:05

## 2019-09-25 RX ADMIN — Medication 80 MILLIGRAM(S): at 06:29

## 2019-09-26 LAB
ANION GAP SERPL CALC-SCNC: 13 MMOL/L — SIGNIFICANT CHANGE UP (ref 5–17)
BUN SERPL-MCNC: 41 MG/DL — HIGH (ref 7–23)
CALCIUM SERPL-MCNC: 8.9 MG/DL — SIGNIFICANT CHANGE UP (ref 8.4–10.5)
CHLORIDE SERPL-SCNC: 101 MMOL/L — SIGNIFICANT CHANGE UP (ref 96–108)
CO2 SERPL-SCNC: 28 MMOL/L — SIGNIFICANT CHANGE UP (ref 22–31)
CREAT SERPL-MCNC: 1.43 MG/DL — HIGH (ref 0.5–1.3)
GLUCOSE SERPL-MCNC: 106 MG/DL — HIGH (ref 70–99)
HCT VFR BLD CALC: 36.7 % — LOW (ref 39–50)
HGB BLD-MCNC: 11.6 G/DL — LOW (ref 13–17)
INR BLD: 2.13 RATIO — HIGH (ref 0.88–1.16)
MAGNESIUM SERPL-MCNC: 2.2 MG/DL — SIGNIFICANT CHANGE UP (ref 1.6–2.6)
MCHC RBC-ENTMCNC: 28.6 PG — SIGNIFICANT CHANGE UP (ref 27–34)
MCHC RBC-ENTMCNC: 31.6 GM/DL — LOW (ref 32–36)
MCV RBC AUTO: 90.6 FL — SIGNIFICANT CHANGE UP (ref 80–100)
NT-PROBNP SERPL-SCNC: HIGH PG/ML (ref 0–300)
PHOSPHATE SERPL-MCNC: 3.5 MG/DL — SIGNIFICANT CHANGE UP (ref 2.5–4.5)
PLATELET # BLD AUTO: 239 K/UL — SIGNIFICANT CHANGE UP (ref 150–400)
POTASSIUM SERPL-MCNC: 4.6 MMOL/L — SIGNIFICANT CHANGE UP (ref 3.5–5.3)
POTASSIUM SERPL-SCNC: 4.6 MMOL/L — SIGNIFICANT CHANGE UP (ref 3.5–5.3)
PROTHROM AB SERPL-ACNC: 24.8 SEC — HIGH (ref 10–13.1)
RBC # BLD: 4.05 M/UL — LOW (ref 4.2–5.8)
RBC # FLD: 14 % — SIGNIFICANT CHANGE UP (ref 10.3–14.5)
SODIUM SERPL-SCNC: 142 MMOL/L — SIGNIFICANT CHANGE UP (ref 135–145)
WBC # BLD: 6.21 K/UL — SIGNIFICANT CHANGE UP (ref 3.8–10.5)
WBC # FLD AUTO: 6.21 K/UL — SIGNIFICANT CHANGE UP (ref 3.8–10.5)

## 2019-09-26 PROCEDURE — 93306 TTE W/DOPPLER COMPLETE: CPT | Mod: 26

## 2019-09-26 PROCEDURE — 99233 SBSQ HOSP IP/OBS HIGH 50: CPT

## 2019-09-26 RX ORDER — WARFARIN SODIUM 2.5 MG/1
6 TABLET ORAL ONCE
Refills: 0 | Status: COMPLETED | OUTPATIENT
Start: 2019-09-26 | End: 2019-09-26

## 2019-09-26 RX ORDER — BUMETANIDE 0.25 MG/ML
2 INJECTION INTRAMUSCULAR; INTRAVENOUS DAILY
Refills: 0 | Status: DISCONTINUED | OUTPATIENT
Start: 2019-09-27 | End: 2019-09-27

## 2019-09-26 RX ADMIN — Medication 80 MILLIGRAM(S): at 06:27

## 2019-09-26 RX ADMIN — CARVEDILOL PHOSPHATE 3.12 MILLIGRAM(S): 80 CAPSULE, EXTENDED RELEASE ORAL at 18:25

## 2019-09-26 RX ADMIN — CARVEDILOL PHOSPHATE 3.12 MILLIGRAM(S): 80 CAPSULE, EXTENDED RELEASE ORAL at 06:28

## 2019-09-26 RX ADMIN — Medication 80 MILLIGRAM(S): at 18:25

## 2019-09-26 RX ADMIN — WARFARIN SODIUM 6 MILLIGRAM(S): 2.5 TABLET ORAL at 21:50

## 2019-09-26 NOTE — PROGRESS NOTE ADULT - ASSESSMENT
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     Problem/Plan -  ·  Problem: Acute on chronic systolic heart failure.  Plan: last TTE Aug2019- EF 20-25%, Moderate MR, Mild AS/AR  Lasix 80 mg IV BID. Cardio f/up noted.     Problem/Plan - 3:  ·  Problem: LUZ (acute kidney injury).  Plan: in setting of acute HF  -    Problem/Plan - 4:  ·  Problem: Thrombus.  Plan: continue coumadin dosing w/ goal INR 2.0-3.0  - Hx of PPM thrombus on last admit Aug 2019       Problem/Plan - 5:  ·  Problem: Anemia, unspecified type.  Plan: chronic  monitor w/ cbc  no signs of hemorrhage      Problem/Plan - 6:  Problem: Essential hypertension. Plan: c/w carvedilol.

## 2019-09-27 VITALS — SYSTOLIC BLOOD PRESSURE: 156 MMHG | HEART RATE: 65 BPM | DIASTOLIC BLOOD PRESSURE: 73 MMHG

## 2019-09-27 LAB
ANION GAP SERPL CALC-SCNC: 10 MMOL/L — SIGNIFICANT CHANGE UP (ref 5–17)
BUN SERPL-MCNC: 38 MG/DL — HIGH (ref 7–23)
CALCIUM SERPL-MCNC: 9 MG/DL — SIGNIFICANT CHANGE UP (ref 8.4–10.5)
CHLORIDE SERPL-SCNC: 94 MMOL/L — LOW (ref 96–108)
CO2 SERPL-SCNC: 30 MMOL/L — SIGNIFICANT CHANGE UP (ref 22–31)
CREAT SERPL-MCNC: 1.45 MG/DL — HIGH (ref 0.5–1.3)
GLUCOSE SERPL-MCNC: 101 MG/DL — HIGH (ref 70–99)
HCT VFR BLD CALC: 38.2 % — LOW (ref 39–50)
HGB BLD-MCNC: 11.9 G/DL — LOW (ref 13–17)
INR BLD: 2.35 RATIO — HIGH (ref 0.88–1.16)
MAGNESIUM SERPL-MCNC: 2.4 MG/DL — SIGNIFICANT CHANGE UP (ref 1.6–2.6)
MCHC RBC-ENTMCNC: 27.9 PG — SIGNIFICANT CHANGE UP (ref 27–34)
MCHC RBC-ENTMCNC: 31.2 GM/DL — LOW (ref 32–36)
MCV RBC AUTO: 89.5 FL — SIGNIFICANT CHANGE UP (ref 80–100)
PHOSPHATE SERPL-MCNC: 4 MG/DL — SIGNIFICANT CHANGE UP (ref 2.5–4.5)
PLATELET # BLD AUTO: 252 K/UL — SIGNIFICANT CHANGE UP (ref 150–400)
POTASSIUM SERPL-MCNC: 4 MMOL/L — SIGNIFICANT CHANGE UP (ref 3.5–5.3)
POTASSIUM SERPL-SCNC: 4 MMOL/L — SIGNIFICANT CHANGE UP (ref 3.5–5.3)
PROTHROM AB SERPL-ACNC: 27.3 SEC — HIGH (ref 10–13.1)
RBC # BLD: 4.27 M/UL — SIGNIFICANT CHANGE UP (ref 4.2–5.8)
RBC # FLD: 13.9 % — SIGNIFICANT CHANGE UP (ref 10.3–14.5)
SODIUM SERPL-SCNC: 134 MMOL/L — LOW (ref 135–145)
WBC # BLD: 6 K/UL — SIGNIFICANT CHANGE UP (ref 3.8–10.5)
WBC # FLD AUTO: 6 K/UL — SIGNIFICANT CHANGE UP (ref 3.8–10.5)

## 2019-09-27 PROCEDURE — 99233 SBSQ HOSP IP/OBS HIGH 50: CPT

## 2019-09-27 RX ORDER — WARFARIN SODIUM 2.5 MG/1
7 TABLET ORAL
Qty: 210 | Refills: 0
Start: 2019-09-27 | End: 2019-10-26

## 2019-09-27 RX ORDER — BUMETANIDE 0.25 MG/ML
1 INJECTION INTRAMUSCULAR; INTRAVENOUS
Qty: 30 | Refills: 0
Start: 2019-09-27 | End: 2019-10-26

## 2019-09-27 RX ORDER — CARVEDILOL PHOSPHATE 80 MG/1
1 CAPSULE, EXTENDED RELEASE ORAL
Qty: 0 | Refills: 0 | DISCHARGE
Start: 2019-09-27

## 2019-09-27 RX ORDER — CARVEDILOL PHOSPHATE 80 MG/1
1 CAPSULE, EXTENDED RELEASE ORAL
Qty: 60 | Refills: 0
Start: 2019-09-27 | End: 2019-10-26

## 2019-09-27 RX ORDER — BUMETANIDE 0.25 MG/ML
2 INJECTION INTRAMUSCULAR; INTRAVENOUS
Refills: 0 | Status: DISCONTINUED | OUTPATIENT
Start: 2019-09-27 | End: 2019-09-27

## 2019-09-27 RX ADMIN — BUMETANIDE 2 MILLIGRAM(S): 0.25 INJECTION INTRAMUSCULAR; INTRAVENOUS at 08:44

## 2019-09-27 RX ADMIN — CARVEDILOL PHOSPHATE 3.12 MILLIGRAM(S): 80 CAPSULE, EXTENDED RELEASE ORAL at 18:01

## 2019-09-27 RX ADMIN — CARVEDILOL PHOSPHATE 3.12 MILLIGRAM(S): 80 CAPSULE, EXTENDED RELEASE ORAL at 05:24

## 2019-09-27 NOTE — PROVIDER CONTACT NOTE (OTHER) - ASSESSMENT
Patient asymptomatic and denied any feelings of palpitations, chest pain, shortness of breath, or dizziness. VSS.

## 2019-09-27 NOTE — PROVIDER CONTACT NOTE (OTHER) - BACKGROUND
Patient admitted for SOB at rest and LE edema with an admitting diagnosis of heart failure. Carvedilol given at 1800.

## 2019-09-27 NOTE — DISCHARGE NOTE PROVIDER - HOSPITAL COURSE
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    - For  Acute on chronic systolic heart failure.  Plan: last TTE Aug2019- EF 20-25%, Moderate MR, Mild AS/AR     S/p Lasix 80 mg IV BID. Cardiology following. Changed to PO Bumex on 9/27/19    - For LUZ (acute kidney injury).  Plan: in setting of acute HF    - For Thrombus.  Plan: continue coumadin dosing w/ goal INR 2.0-3.0. 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  Plan: chronic monitor w/ cbc, no signs of hemorrhage     - for Essential hypertension. Plan: c/w carvedilol. 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    - For  Acute on chronic systolic heart failure.  Plan: last TTE Aug2019- EF 20-25%, Moderate MR, Mild AS/AR     S/p Lasix 80 mg IV BID. Cardiology following. Changed to PO Bumex on 9/27/19    - For LUZ (acute kidney injury).  Plan: in setting of acute HF    - For Thrombus.  Plan: continue coumadin dosing w/ goal INR 2.0-3.0. 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  Plan: chronic monitor w/ cbc, no signs of hemorrhage     - for Essential hypertension. Plan: c/w carvedilol.         Patient stable for discharge on 9/27/19 per Dr. Solomon

## 2019-09-27 NOTE — DISCHARGE NOTE PROVIDER - CARE PROVIDERS DIRECT ADDRESSES
,DirectAddress_Unknown ,DirectAddress_Unknown,[email or web address] ,DirectAddress_Unknown,[email or web address]

## 2019-09-27 NOTE — DISCHARGE NOTE NURSING/CASE MANAGEMENT/SOCIAL WORK - PATIENT PORTAL LINK FT
You can access the FollowMyHealth Patient Portal offered by [facility] by registering at the following website: [email or web address]. By joining [facility]’s FollowMyHealth portal, you will also be able to view your health information using other applications (apps) compatible with our system.

## 2019-09-27 NOTE — DISCHARGE NOTE PROVIDER - NSDCCPCAREPLAN_GEN_ALL_CORE_FT
PRINCIPAL DISCHARGE DIAGNOSIS  Diagnosis: CHF (congestive heart failure)  Assessment and Plan of Treatment: Weigh yourself daily.  If you gain 3lbs in 3 days, or 5lbs in a week call your Health Care Provider.  Do not eat or drink foods containing more than 2000mg of salt (sodium) in your diet every day.  Call your Health Care Provider if you have any swelling or increased swelling in your feet, ankles, and/or stomach.  The Pt was provided with CHF diet instruction (low sodium diet, daily weights, label reading, Heart Healthy Cooking Tips & Heart Healthy shopping Tips).  Take all of your medication as directed.  If you become dizzy call your Health Care Provider.        SECONDARY DISCHARGE DIAGNOSES  Diagnosis: Acute pulmonary edema  Assessment and Plan of Treatment:     Diagnosis: LUZ (acute kidney injury)  Assessment and Plan of Treatment: You had an acute kidney injury during this admission which means your kidney function was impaired temporarily. Most of the time, this is due to decreased oral intake and dehydration. Drinking fluids often throughout the day to stay hydrated is recommended. Your kidney function has since resolved and returned to normal. Lab tests that monitor your kidney function include BUN/creatinine. You may need to monitor these levels with your primary doctor upon discharge. For now, it is recommended that you avoid taking any nephrotoxic agents (medications that can harm the kidneys and further decrease kidney function. These medications include NSAIDs (anti-inflammatories) including (ex: Ibuprofen, Advil, Motrin, Celebrex, Naprosyn/Aleve), Intravenous contrast for diagnostic testing, certain antibiotics, certain chemotherapeutic agents, or any combination cold medications. Ask your doctor if it is okay to resume taking these medications once you are discharged and follow up in the office.   Have all medications adjusted for your renal function by your Health Care Provider if necessary.      Diagnosis: Thrombus  Assessment and Plan of Treatment:     Diagnosis: Anemia  Assessment and Plan of Treatment: Continue iron supplements  Eat iron and protein rich foods including: meat, dark leafy green vegetables, and beans.  Limit caffeine.  Notify your doctor if you experience heartburn, constipation, diarrhea, nausea/vomiting, dizziness or are feeling extremely tired.  Seek immediate care or call 911 if you experience:  shortness of breath even at rest, you have blood in your bowel movement or vomit, if you are too dizzy to stand up      Diagnosis: HTN (hypertension)  Assessment and Plan of Treatment: Low salt diet  Activity as tolerated.  Take all medication as prescribed.  Follow up with your medical doctor for routine blood pressure monitoring at your next visit.  Notify your doctor if you have any of the following symptoms:   Dizziness, Lightheadedness, Blurry vision, Headache, Chest pain, Shortness of breath PRINCIPAL DISCHARGE DIAGNOSIS  Diagnosis: CHF (congestive heart failure)  Assessment and Plan of Treatment: Cardiology was consulted  Last TTE Aug2019- EF 20-25%, Moderate MR, Mild AS/AR   you were on Lasix 80 mg IV twice a day.  Changed to PO Bumex on 9/27/19  Follow up with cardiology  Weigh yourself daily.  If you gain 3lbs in 3 days, or 5lbs in a week call your Health Care Provider.  Do not eat or drink foods containing more than 2000mg of salt (sodium) in your diet every day.  Call your Health Care Provider if you have any swelling or increased swelling in your feet, ankles, and/or stomach.  The Pt was provided with CHF diet instruction (low sodium diet, daily weights, label reading, Heart Healthy Cooking Tips & Heart Healthy shopping Tips).  Take all of your medication as directed.  If you become dizzy call your Health Care Provider.        SECONDARY DISCHARGE DIAGNOSES  Diagnosis: Acute pulmonary edema  Assessment and Plan of Treatment:     Diagnosis: LUZ (acute kidney injury)  Assessment and Plan of Treatment: You had an acute kidney injury during this admission which means your kidney function was impaired temporarily. Most of the time, this is due to decreased oral intake and dehydration. Drinking fluids often throughout the day to stay hydrated is recommended. Your kidney function has since resolved and returned to normal. Lab tests that monitor your kidney function include BUN/creatinine. You may need to monitor these levels with your primary doctor upon discharge. For now, it is recommended that you avoid taking any nephrotoxic agents (medications that can harm the kidneys and further decrease kidney function. These medications include NSAIDs (anti-inflammatories) including (ex: Ibuprofen, Advil, Motrin, Celebrex, Naprosyn/Aleve), Intravenous contrast for diagnostic testing, certain antibiotics, certain chemotherapeutic agents, or any combination cold medications. Ask your doctor if it is okay to resume taking these medications once you are discharged and follow up in the office.   Have all medications adjusted for your renal function by your Health Care Provider if necessary.      Diagnosis: Thrombus  Assessment and Plan of Treatment: Continue coumadin dosing w/ goal INR 2.0-3.0 for history of pacemaker thrombus on last admission in August 2019    Diagnosis: Anemia  Assessment and Plan of Treatment: chronic anemia  no signs of hemorrhage  Follow up with your primary care provider for management   Continue iron supplements  Eat iron and protein rich foods including: meat, dark leafy green vegetables, and beans.  Limit caffeine.  Notify your doctor if you experience heartburn, constipation, diarrhea, nausea/vomiting, dizziness or are feeling extremely tired.  Seek immediate care or call 911 if you experience:  shortness of breath even at rest, you have blood in your bowel movement or vomit, if you are too dizzy to stand up      Diagnosis: HTN (hypertension)  Assessment and Plan of Treatment: Continue carvedilol.   Low salt diet  Activity as tolerated.  Take all medication as prescribed.  Follow up with your medical doctor for routine blood pressure monitoring at your next visit.  Notify your doctor if you have any of the following symptoms:   Dizziness, Lightheadedness, Blurry vision, Headache, Chest pain, Shortness of breath PRINCIPAL DISCHARGE DIAGNOSIS  Diagnosis: CHF (congestive heart failure)  Assessment and Plan of Treatment: Improving.  Cardiology was consulted  Last TTE Aug2019- EF 20-25%, Moderate MR, Mild AS/AR   you were on Lasix 80 mg IV twice a day.  Changed to PO Bumex on 9/27/19  **Follow up with cardiology  Weigh yourself daily.  If you gain 3lbs in 3 days, or 5lbs in a week call your Health Care Provider.  Do not eat or drink foods containing more than 2000mg of salt (sodium) in your diet every day.  Call your Health Care Provider if you have any swelling or increased swelling in your feet, ankles, and/or stomach.  The Pt was provided with CHF diet instruction (low sodium diet, daily weights, label reading, Heart Healthy Cooking Tips & Heart Healthy shopping Tips).  Take all of your medication as directed.  If you become dizzy call your Health Care Provider.        SECONDARY DISCHARGE DIAGNOSES  Diagnosis: Acute pulmonary edema  Assessment and Plan of Treatment: Seen by cardiology  Was on IV Lasix, swicthed to PO Bumex on 9/27  FOLLOW up with your primary care provider and Cardiology    Diagnosis: LUZ (acute kidney injury)  Assessment and Plan of Treatment: Follow up with your primary care provider for repeat blood work  You had an acute kidney injury during this admission which means your kidney function was impaired temporarily. Most of the time, this is due to decreased oral intake and dehydration. Drinking fluids often throughout the day to stay hydrated is recommended. Your kidney function has since resolved and returned to normal. Lab tests that monitor your kidney function include BUN/creatinine. You may need to monitor these levels with your primary doctor upon discharge. For now, it is recommended that you avoid taking any nephrotoxic agents (medications that can harm the kidneys and further decrease kidney function. These medications include NSAIDs (anti-inflammatories) including (ex: Ibuprofen, Advil, Motrin, Celebrex, Naprosyn/Aleve), Intravenous contrast for diagnostic testing, certain antibiotics, certain chemotherapeutic agents, or any combination cold medications. Ask your doctor if it is okay to resume taking these medications once you are discharged and follow up in the office.   Have all medications adjusted for your renal function by your Health Care Provider if necessary.      Diagnosis: Thrombus  Assessment and Plan of Treatment: Continue coumadin dosing w/ goal INR 2.0-3.0 for history of pacemaker thrombus on last admission in August 2019  *Check INR level in 3 days with your primary care provider for further dosing.    Diagnosis: Anemia  Assessment and Plan of Treatment: chronic anemia  no signs of hemorrhage  Follow up with your primary care provider for management   Continue iron supplements  Eat iron and protein rich foods including: meat, dark leafy green vegetables, and beans.  Limit caffeine.  Notify your doctor if you experience heartburn, constipation, diarrhea, nausea/vomiting, dizziness or are feeling extremely tired.  Seek immediate care or call 911 if you experience:  shortness of breath even at rest, you have blood in your bowel movement or vomit, if you are too dizzy to stand up      Diagnosis: HTN (hypertension)  Assessment and Plan of Treatment: Continue carvedilol.   Low salt diet  Activity as tolerated.  Take all medication as prescribed.  Follow up with your medical doctor for routine blood pressure monitoring at your next visit.  Notify your doctor if you have any of the following symptoms:   Dizziness, Lightheadedness, Blurry vision, Headache, Chest pain, Shortness of breath

## 2019-09-27 NOTE — CHART NOTE - NSCHARTNOTEFT_GEN_A_CORE
PA Medicine Chart Note    Patient medically cleared for discharge home today per Dr. Solomon.  Spoke to cardiology, Dr. Lobato: Patient cleared by cardiology, place patient back on carvedilol 6.25 BID (home dose) as patient had episode of tachycardia ON on tele.  Discussed medications for discharge with Dr. Solomon including coumadin 7 mg (home dose).    Елена Chavez PA-C   Dept of Medicine  #[id number]

## 2019-09-27 NOTE — PROGRESS NOTE ADULT - PROVIDER SPECIALTY LIST ADULT
Cardiology
Cardiology
Internal Medicine
Cardiology

## 2019-09-27 NOTE — DISCHARGE NOTE PROVIDER - PROVIDER TOKENS
PROVIDER:[TOKEN:[[id number]]] PROVIDER:[TOKEN:[[id number]]],PROVIDER:[TOKEN:[[id number]]] PROVIDER:[TOKEN:[[id number]]],PROVIDER:[TOKEN:[[id number]]],PROVIDER:[TOKEN:[[id number]]]

## 2019-09-27 NOTE — PROGRESS NOTE ADULT - REASON FOR ADMISSION
89M p/w 3d of "gasping for air"

## 2019-09-27 NOTE — DISCHARGE NOTE PROVIDER - CARE PROVIDER_API CALL
Ike Fisher (MD)  Medicine  [address] [facility], [facility]  [address]  [place] [address]  Phone: [phone]  Fax: [phone]  Follow Up Time: Ike Fisher (MD)  Medicine  [address] [facility], [facility]  [address]  [place] [address]  Phone: [phone]  Fax: [phone]  Follow Up Time:     Panda Milian)  Cardiovascular Disease; Internal Medicine  [address] [facility], [facility] [address]  [place] [address]  Phone: [phone]  Fax: [phone]  Follow Up Time: Ike Fisher (MD)  Medicine  [address] [facility]  [place] [address]  Phone: [phone]  Fax: [phone]  Follow Up Time:     Panda Milian)  Cardiovascular Disease; Internal Medicine  [address] [facility], [facility] [address]  [place] [address]  Phone: [phone]  Fax: [phone]  Follow Up Time:     [place])  Cardiovascular Disease; Internal Medicine  [address], [address] [place] [address]  Phone: [phone]  Fax: [phone]  Follow Up Time:

## 2019-09-27 NOTE — PROGRESS NOTE ADULT - ASSESSMENT
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     Problem/Plan -  ·  Problem: Acute on chronic systolic heart failure.  Plan: last TTE Aug2019- EF 20-25%, Moderate MR, Mild AS/AR  PO Bumex. Cardio f/up noted.    LUZ:  Sec to Diuretics  -    Problem/Plan - 4:  ·  Problem: Thrombus.  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  Plan: chronic  monitor w/ cbc  no signs of hemorrhage      Problem/Plan - 6:  Problem: Essential hypertension. Plan: c/w carvedilol.     Dc planning.

## 2019-09-27 NOTE — PROGRESS NOTE ADULT - SUBJECTIVE AND OBJECTIVE BOX
Consult Follow Up Note:  · Requested by Name:	Dr. Solomon	  · Date/Time:	26-Sep-2019 	  · Reason for Referral/Consultation:	SOB/AdCHF	  · Reason for Admission	89M p/w 3d of "gasping for air"	      · Subjective and Objective: 	  **********              CARDIOLOGY CONSULT NOTE              ************  ============================================================  CHIEF COMPLAINT/REASON FOR CONSULT:  Patient is a 89y old  Male who presents with a chief complaint of 89M p/w 3d of "gasping for air" (24 Sep 2019 17:50)      HISTORY OF PRESENT ILLNESS:  89yMale with a history of Prostate CA, PPM with treatment for lead thrombus, HTN, HFrEF presenting with dyspnea, orthopnea - c/w acute decompensated systolic CHF.  Briefly, Patient reports that he started having "gasping for air" 3d ago which was associated w/ lower extremity swelling, inability to lay flat w/o chest pain palpitations, fever, chills, n/v/d, change in urination.        ===========================================================  Interval Events   - Cr coming down  - Significantly improved     ============================================================      Allergies    No Known Allergies    Intolerances    	    MEDICATIONS:  carvedilol 6.25 milliGRAM(s) Oral every 12 hours  furosemide   Injectable 80 milliGRAM(s) IV Push two times a day              influenza   Vaccine 0.5 milliLiter(s) IntraMuscular once      PAST MEDICAL & SURGICAL HISTORY:  Prostate cancer  CHF (congestive heart failure)  BPH (benign prostatic hyperplasia)  Hypertension  History of pacemaker: 2015 back in [place]      FAMILY HISTORY:  Mother - HTN      SOCIAL HISTORY:    [ x] Non-smoker  [x] No Illicit Drug Use  [ x] No Excess EtOH Use      REVIEW OF SYSTEMS: (Unless + Before Symptom, it is negative)  Constitutional: [] Fever, []Fatigue, []Weight Changes  Eyes:  []Recent Vision Changes, []Eye Pain  ENT: []Congestion, []Sore Throat  Endocrine: []Excess Sweating, []Temperature Intolerance  Cardiovascular:  []Chest Pain, []Palpitations, [+]Shortness of Breath, []Pre-syncope, []Syncope,[+] LE Swelling  Respiratory:[] Cough, []Congestion,[] Wheezing  Gastrointestinal: [] Abdominal Pain,[] Nausea, []Vomiting  Genitourinary: []Dysuria,[] hematuria  Musculoskeletal: []Joint Pain, []Hip/Knee Injury  Neurologic: []Headaches,[] Imbalance, []Weakness  Skin: []Rashes, []hematoma, []purprura    ================================    PHYSICAL EXAM:  T(C): 36.5 (09-24-19 @ 21:45), Max: 36.6 (09-24-19 @ 00:11)  HR: 66 (09-24-19 @ 21:45) (66 - 79)  BP: 147/80 (09-24-19 @ 21:45) (126/79 - 147/80)  RR: 18 (09-24-19 @ 21:45) (18 - 18)  SpO2: 97% (09-24-19 @ 21:45) (97% - 100%)  Wt(kg): --  I&O's Summary    23 Sep 2019 07:01  -  24 Sep 2019 07:00  --------------------------------------------------------  IN: 240 mL / OUT: 0 mL / NET: 240 mL    24 Sep 2019 07:01  -  24 Sep 2019 23:19  --------------------------------------------------------  IN: 0 mL / OUT: 700 mL / NET: -700 mL      Appearance: Normal; NAD	  Psychiatry: AOx3; Normal Mood/Affect  HEENT:   Normal oral mucosa, EOMI	  Lymphatic: No lymphadenopathy  Cardiovascular: Normal S1 S2, No furtherJVD, No murmurs, 0-1+edema  Respiratory: Lungs clear to auscultation, no use of accessory muscles	  Gastrointestinal:  Soft, Non-tender	  Skin: No rashes, No ecchymoses, No cyanosis	  Neurologic: Non-focal, No Focal Deficits  Extremities: Normal range of motion, No clubbing, cyanosis  Vascular: Peripheral pulses palpable 2+ bilaterally, no prominent varicosities    ============================    LABS:	   Labs [id number]    CBC Full  -  ( 24 Sep 2019 09:28 )  WBC Count : 6.39 K/uL  Hemoglobin : 10.5 g/dL  Hematocrit : 32.6 %  Platelet Count - Automated : 184 K/uL  Mean Cell Volume : 91.1 fl  Mean Cell Hemoglobin : 29.3 pg  Mean Cell Hemoglobin Concentration : 32.2 gm/dL  Auto Neutrophil # : x  Auto Lymphocyte # : x  Auto Monocyte # : x  Auto Eosinophil # : x  Auto Basophil # : x  Auto Neutrophil % : x  Auto Lymphocyte % : x  Auto Monocyte % : x  Auto Eosinophil % : x  Auto Basophil % : x    09-24    139  |  104  |  51<H>  ----------------------------<  111<H>  4.3   |  20<L>  |  1.82<H>  09-23    140  |  107  |  43<H>  ----------------------------<  103<H>  4.7   |  21<L>  |  1.78<H>    Ca    8.9      24 Sep 2019 06:05  Ca    9.3      23 Sep 2019 06:11  Phos  3.8     09-23  Mg     2.4     09-23    TPro  6.7  /  Alb  3.5  /  TBili  0.5  /  DBili  x   /  AST  26  /  ALT  13  /  AlkPhos  64  09-24    < from: TTE with Doppler (w/Cont) (08.19.19 @ 13:25) >  EF (Visual Estimate): 20-25 %  Doppler Peak Velocity (m/sec): AoV=1.2  ------------------------------------------------------------------------  Observations:  Mitral Valve: Mitral annular calcification;  Tethered  mitral valve leaflets with normal opening. Moderate mitral  regurgitation.  Aortic Valve/Aorta: Calcified trileaflet aortic valve with  decreased opening. Peak transaortic valve gradient equals 6  mm Hg, mean transaortic valve gradient equals 3 mm Hg,  estimated aortic valve area equals 1.7 sqcm (by continuity  equation), aortic valve velocity time integral equals 29  cm, consistent with mild aortic stenosis. Mild aortic  regurgitation.  Aortic Root: 2.7 cm.  Left Atrium: Mildly dilated left atrium.  LA volume index =  38 cc/m2.  Left Ventricle: Severe segmental left ventricular systolic  dysfunction. The function is best preserved at basal to mid  lateral wall, basal inferolateral wal and basal anterior  wall; all remaining segments are hypokinetic. Moderate  diastolic dysfunction  Right Heart: A device wire is noted in the right heart.  There appears to be thickening on wire with a possible  mobile elements- clinical cooelation indicated.  Normal  right ventricular size with decreased right ventricular  systolic function. Tricuspid valve not well visualized.  Normal pulmonic valve. Mild-moderate pulmonic  regurgitation.  Pericardium/Pleura: Normal pericardium with no pericardial  effusion.  Hemodynamic: Estimated right atrial pressure is 8 mm Hg.  Estimated right ventricular systolic pressure equals 46 mm  Hg, assuming right atrial pressure equals 8 mm Hg,  consistent with mild pulmonary hypertension.  ------------------------------------------------------------------------  Conclusions:  1. Mitral annular calcification;  Tethered mitral valve  leaflets with normal opening. Moderate mitral  regurgitation.  2. Calcified trileaflet aortic valve with decreased  opening. Peak transaortic valve gradient equals 6 mm Hg,  mean transaortic valve gradient equals 3 mm Hg, estimated  aortic valve area equals 1.7 sqcm (by continuity equation),  aortic valve velocity time integral equals 29 cm,  consistent with mild aortic stenosis. Mild aortic  regurgitation.  3. Severe segmental left ventricular systolic dysfunction.  The function is best preserved at basal to mid lateral  wall, basal inferolateral wal and basal anterior wall; all  remaining segments are hypokinetic.  4. Moderate diastolic dysfunction  5. A device wire is noted in the right heart. There appears  to be thickening on wire with a possible mobile elements-  clinical cooelation indicated.  6. Normal right ventricular size with decreased right  ventricular systolic function.  *** No previous Echo exam.    < end of copied text >        =========================================================================  ASSESSMENT:  89yMale with a history of Prostate CA, PPM with treatment for lead thrombus, HTN, HFrEF presenting with dyspnea, orthopnea - c/w acute decompensated systolic CHF.  Still volume up with prominent JVD, crackles, LE edema  carvedilol 6.25 milliGRAM(s) Oral every 12 hours  furosemide   Injectable 80 milliGRAM(s) IV Push two times a day  influenza   Vaccine 0.5 milliLiter(s) IntraMuscular once      Recommendations  - **Doing much better  - **Switch to 2mg Bumex daily   - Mobilization/PT  - Dispo  - Continue coreg - reduce to 3.125  - continue warfarin for PPM thrombus  - Will follow        Please call with questions.     Lewis Woods MD, [facility]  [phone]
Consult Follow Up Note:  · Requested by Name:	Dr. Solomon	  · Date/Time:	27-Sep-2019 	  · Reason for Referral/Consultation:	SOB/AdCHF	  · Reason for Admission	89M p/w 3d of "gasping for air"	      · Subjective and Objective: 	  **********              CARDIOLOGY CONSULT NOTE              ************  ============================================================  CHIEF COMPLAINT/REASON FOR CONSULT:  Patient is a 89y old  Male who presents with a chief complaint of 89M p/w 3d of "gasping for air" (24 Sep 2019 17:50)      HISTORY OF PRESENT ILLNESS:  89yMale with a history of Prostate CA, PPM with treatment for lead thrombus, HTN, HFrEF presenting with dyspnea, orthopnea - c/w acute decompensated systolic CHF.  Briefly, Patient reports that he started having "gasping for air" 3d ago which was associated w/ lower extremity swelling, inability to lay flat w/o chest pain palpitations, fever, chills, n/v/d, change in urination.        ===========================================================  Interval Events   - Cr coming down  - Significantly improved   - switched to po bumex    ============================================================      Allergies    No Known Allergies    Intolerances    	    MEDICATIONS:  carvedilol 6.25 milliGRAM(s) Oral every 12 hours  furosemide   Injectable 80 milliGRAM(s) IV Push two times a day              influenza   Vaccine 0.5 milliLiter(s) IntraMuscular once      PAST MEDICAL & SURGICAL HISTORY:  Prostate cancer  CHF (congestive heart failure)  BPH (benign prostatic hyperplasia)  Hypertension  History of pacemaker: 2015 back in [place]      FAMILY HISTORY:  Mother - HTN      SOCIAL HISTORY:    [ x] Non-smoker  [x] No Illicit Drug Use  [ x] No Excess EtOH Use      REVIEW OF SYSTEMS: (Unless + Before Symptom, it is negative)  Constitutional: [] Fever, []Fatigue, []Weight Changes  Eyes:  []Recent Vision Changes, []Eye Pain  ENT: []Congestion, []Sore Throat  Endocrine: []Excess Sweating, []Temperature Intolerance  Cardiovascular:  []Chest Pain, []Palpitations, [+]Shortness of Breath, []Pre-syncope, []Syncope,[+] LE Swelling  Respiratory:[] Cough, []Congestion,[] Wheezing  Gastrointestinal: [] Abdominal Pain,[] Nausea, []Vomiting  Genitourinary: []Dysuria,[] hematuria  Musculoskeletal: []Joint Pain, []Hip/Knee Injury  Neurologic: []Headaches,[] Imbalance, []Weakness  Skin: []Rashes, []hematoma, []purprura    ================================    PHYSICAL EXAM:  T(C): 36.5 (09-24-19 @ 21:45), Max: 36.6 (09-24-19 @ 00:11)  HR: 66 (09-24-19 @ 21:45) (66 - 79)  BP: 147/80 (09-24-19 @ 21:45) (126/79 - 147/80)  RR: 18 (09-24-19 @ 21:45) (18 - 18)  SpO2: 97% (09-24-19 @ 21:45) (97% - 100%)  Wt(kg): --  I&O's Summary    23 Sep 2019 07:01  -  24 Sep 2019 07:00  --------------------------------------------------------  IN: 240 mL / OUT: 0 mL / NET: 240 mL    24 Sep 2019 07:01  -  24 Sep 2019 23:19  --------------------------------------------------------  IN: 0 mL / OUT: 700 mL / NET: -700 mL      Appearance: Normal; NAD	  Psychiatry: AOx3; Normal Mood/Affect  HEENT:   Normal oral mucosa, EOMI	  Lymphatic: No lymphadenopathy  Cardiovascular: Normal S1 S2, No furtherJVD, No murmurs, 0-1+edema  Respiratory: Lungs clear to auscultation, no use of accessory muscles	  Gastrointestinal:  Soft, Non-tender	  Skin: No rashes, No ecchymoses, No cyanosis	  Neurologic: Non-focal, No Focal Deficits  Extremities: Normal range of motion, No clubbing, cyanosis  Vascular: Peripheral pulses palpable 2+ bilaterally, no prominent varicosities    ============================    LABS:	   Labs [id number]    CBC Full  -  ( 24 Sep 2019 09:28 )  WBC Count : 6.39 K/uL  Hemoglobin : 10.5 g/dL  Hematocrit : 32.6 %  Platelet Count - Automated : 184 K/uL  Mean Cell Volume : 91.1 fl  Mean Cell Hemoglobin : 29.3 pg  Mean Cell Hemoglobin Concentration : 32.2 gm/dL  Auto Neutrophil # : x  Auto Lymphocyte # : x  Auto Monocyte # : x  Auto Eosinophil # : x  Auto Basophil # : x  Auto Neutrophil % : x  Auto Lymphocyte % : x  Auto Monocyte % : x  Auto Eosinophil % : x  Auto Basophil % : x    09-24    139  |  104  |  51<H>  ----------------------------<  111<H>  4.3   |  20<L>  |  1.82<H>  09-23    140  |  107  |  43<H>  ----------------------------<  103<H>  4.7   |  21<L>  |  1.78<H>    Ca    8.9      24 Sep 2019 06:05  Ca    9.3      23 Sep 2019 06:11  Phos  3.8     09-23  Mg     2.4     09-23    TPro  6.7  /  Alb  3.5  /  TBili  0.5  /  DBili  x   /  AST  26  /  ALT  13  /  AlkPhos  64  09-24    < from: TTE with Doppler (w/Cont) (08.19.19 @ 13:25) >  EF (Visual Estimate): 20-25 %  Doppler Peak Velocity (m/sec): AoV=1.2  ------------------------------------------------------------------------  Observations:  Mitral Valve: Mitral annular calcification;  Tethered  mitral valve leaflets with normal opening. Moderate mitral  regurgitation.  Aortic Valve/Aorta: Calcified trileaflet aortic valve with  decreased opening. Peak transaortic valve gradient equals 6  mm Hg, mean transaortic valve gradient equals 3 mm Hg,  estimated aortic valve area equals 1.7 sqcm (by continuity  equation), aortic valve velocity time integral equals 29  cm, consistent with mild aortic stenosis. Mild aortic  regurgitation.  Aortic Root: 2.7 cm.  Left Atrium: Mildly dilated left atrium.  LA volume index =  38 cc/m2.  Left Ventricle: Severe segmental left ventricular systolic  dysfunction. The function is best preserved at basal to mid  lateral wall, basal inferolateral wal and basal anterior  wall; all remaining segments are hypokinetic. Moderate  diastolic dysfunction  Right Heart: A device wire is noted in the right heart.  There appears to be thickening on wire with a possible  mobile elements- clinical cooelation indicated.  Normal  right ventricular size with decreased right ventricular  systolic function. Tricuspid valve not well visualized.  Normal pulmonic valve. Mild-moderate pulmonic  regurgitation.  Pericardium/Pleura: Normal pericardium with no pericardial  effusion.  Hemodynamic: Estimated right atrial pressure is 8 mm Hg.  Estimated right ventricular systolic pressure equals 46 mm  Hg, assuming right atrial pressure equals 8 mm Hg,  consistent with mild pulmonary hypertension.  ------------------------------------------------------------------------  Conclusions:  1. Mitral annular calcification;  Tethered mitral valve  leaflets with normal opening. Moderate mitral  regurgitation.  2. Calcified trileaflet aortic valve with decreased  opening. Peak transaortic valve gradient equals 6 mm Hg,  mean transaortic valve gradient equals 3 mm Hg, estimated  aortic valve area equals 1.7 sqcm (by continuity equation),  aortic valve velocity time integral equals 29 cm,  consistent with mild aortic stenosis. Mild aortic  regurgitation.  3. Severe segmental left ventricular systolic dysfunction.  The function is best preserved at basal to mid lateral  wall, basal inferolateral wal and basal anterior wall; all  remaining segments are hypokinetic.  4. Moderate diastolic dysfunction  5. A device wire is noted in the right heart. There appears  to be thickening on wire with a possible mobile elements-  clinical cooelation indicated.  6. Normal right ventricular size with decreased right  ventricular systolic function.  *** No previous Echo exam.    < end of copied text >        =========================================================================  ASSESSMENT:  89yMale with a history of Prostate CA, PPM with treatment for lead thrombus, HTN, HFrEF presenting with dyspnea, orthopnea - c/w acute decompensated systolic CHF.  Still volume up with prominent JVD, crackles, LE edema  carvedilol 6.25 milliGRAM(s) Oral every 12 hours  furosemide   Injectable 80 milliGRAM(s) IV Push two times a day  influenza   Vaccine 0.5 milliLiter(s) IntraMuscular once      Recommendations  - **Doing much better  - **Continue 2mg Bumex daily   - Mobilization/PT  - Dispo  - OK to D/c  - continue warfarin for PPM thrombus  - Will follow        Please call with questions.     Lewis Woods MD, FACC [facility]  [phone]
Consult:  · Requested by Name:	Dr. Solomon	  · Date/Time:	25-Sep-2019 	  · Reason for Referral/Consultation:	SOB/AdCHF	  · Reason for Admission	89M p/w 3d of "gasping for air"	      · Subjective and Objective: 	  **********              CARDIOLOGY CONSULT NOTE              ************  ============================================================  CHIEF COMPLAINT/REASON FOR CONSULT:  Patient is a 89y old  Male who presents with a chief complaint of 89M p/w 3d of "gasping for air" (24 Sep 2019 17:50)      HISTORY OF PRESENT ILLNESS:  89yMale with a history of Prostate CA, PPM with treatment for lead thrombus, HTN, HFrEF presenting with dyspnea, orthopnea - c/w acute decompensated systolic CHF.  Briefly, Patient reports that he started having "gasping for air" 3d ago which was associated w/ lower extremity swelling, inability to lay flat w/o chest pain palpitations, fever, chills, n/v/d, change in urination.        ===========================================================  Interval Events   - Cr coming down  - No sig UOP overnight per Is/Os  - will re-assess in AM if Metolazone needs to be given    ============================================================      Allergies    No Known Allergies    Intolerances    	    MEDICATIONS:  carvedilol 6.25 milliGRAM(s) Oral every 12 hours  furosemide   Injectable 80 milliGRAM(s) IV Push two times a day              influenza   Vaccine 0.5 milliLiter(s) IntraMuscular once      PAST MEDICAL & SURGICAL HISTORY:  Prostate cancer  CHF (congestive heart failure)  BPH (benign prostatic hyperplasia)  Hypertension  History of pacemaker: 2015 back in [place]      FAMILY HISTORY:  Mother - HTN      SOCIAL HISTORY:    [ x] Non-smoker  [x] No Illicit Drug Use  [ x] No Excess EtOH Use      REVIEW OF SYSTEMS: (Unless + Before Symptom, it is negative)  Constitutional: [] Fever, []Fatigue, []Weight Changes  Eyes:  []Recent Vision Changes, []Eye Pain  ENT: []Congestion, []Sore Throat  Endocrine: []Excess Sweating, []Temperature Intolerance  Cardiovascular:  []Chest Pain, []Palpitations, [+]Shortness of Breath, []Pre-syncope, []Syncope,[+] LE Swelling  Respiratory:[+] Cough, []Congestion,[] Wheezing  Gastrointestinal: [] Abdominal Pain,[] Nausea, []Vomiting  Genitourinary: []Dysuria,[] hematuria  Musculoskeletal: []Joint Pain, []Hip/Knee Injury  Neurologic: []Headaches,[] Imbalance, []Weakness  Skin: []Rashes, []hematoma, []purprura    ================================    PHYSICAL EXAM:  T(C): 36.5 (09-24-19 @ 21:45), Max: 36.6 (09-24-19 @ 00:11)  HR: 66 (09-24-19 @ 21:45) (66 - 79)  BP: 147/80 (09-24-19 @ 21:45) (126/79 - 147/80)  RR: 18 (09-24-19 @ 21:45) (18 - 18)  SpO2: 97% (09-24-19 @ 21:45) (97% - 100%)  Wt(kg): --  I&O's Summary    23 Sep 2019 07:01  -  24 Sep 2019 07:00  --------------------------------------------------------  IN: 240 mL / OUT: 0 mL / NET: 240 mL    24 Sep 2019 07:01  -  24 Sep 2019 23:19  --------------------------------------------------------  IN: 0 mL / OUT: 700 mL / NET: -700 mL      Appearance: Normal; NAD	  Psychiatry: AOx3; Normal Mood/Affect  HEENT:   Normal oral mucosa, EOMI	  Lymphatic: No lymphadenopathy  Cardiovascular: Normal S1 S2, +JVD, No murmurs, +1-2+Pitting edema b/l  Respiratory: Lungs clear to auscultation, no use of accessory muscles	  Gastrointestinal:  Soft, Non-tender	  Skin: No rashes, No ecchymoses, No cyanosis	  Neurologic: Non-focal, No Focal Deficits  Extremities: Normal range of motion, No clubbing, cyanosis  Vascular: Peripheral pulses palpable 2+ bilaterally, no prominent varicosities    ============================    LABS:	   Labs [id number]    CBC Full  -  ( 24 Sep 2019 09:28 )  WBC Count : 6.39 K/uL  Hemoglobin : 10.5 g/dL  Hematocrit : 32.6 %  Platelet Count - Automated : 184 K/uL  Mean Cell Volume : 91.1 fl  Mean Cell Hemoglobin : 29.3 pg  Mean Cell Hemoglobin Concentration : 32.2 gm/dL  Auto Neutrophil # : x  Auto Lymphocyte # : x  Auto Monocyte # : x  Auto Eosinophil # : x  Auto Basophil # : x  Auto Neutrophil % : x  Auto Lymphocyte % : x  Auto Monocyte % : x  Auto Eosinophil % : x  Auto Basophil % : x    09-24    139  |  104  |  51<H>  ----------------------------<  111<H>  4.3   |  20<L>  |  1.82<H>  09-23    140  |  107  |  43<H>  ----------------------------<  103<H>  4.7   |  21<L>  |  1.78<H>    Ca    8.9      24 Sep 2019 06:05  Ca    9.3      23 Sep 2019 06:11  Phos  3.8     09-23  Mg     2.4     09-23    TPro  6.7  /  Alb  3.5  /  TBili  0.5  /  DBili  x   /  AST  26  /  ALT  13  /  AlkPhos  64  09-24    < from: TTE with Doppler (w/Cont) (08.19.19 @ 13:25) >  EF (Visual Estimate): 20-25 %  Doppler Peak Velocity (m/sec): AoV=1.2  ------------------------------------------------------------------------  Observations:  Mitral Valve: Mitral annular calcification;  Tethered  mitral valve leaflets with normal opening. Moderate mitral  regurgitation.  Aortic Valve/Aorta: Calcified trileaflet aortic valve with  decreased opening. Peak transaortic valve gradient equals 6  mm Hg, mean transaortic valve gradient equals 3 mm Hg,  estimated aortic valve area equals 1.7 sqcm (by continuity  equation), aortic valve velocity time integral equals 29  cm, consistent with mild aortic stenosis. Mild aortic  regurgitation.  Aortic Root: 2.7 cm.  Left Atrium: Mildly dilated left atrium.  LA volume index =  38 cc/m2.  Left Ventricle: Severe segmental left ventricular systolic  dysfunction. The function is best preserved at basal to mid  lateral wall, basal inferolateral wal and basal anterior  wall; all remaining segments are hypokinetic. Moderate  diastolic dysfunction  Right Heart: A device wire is noted in the right heart.  There appears to be thickening on wire with a possible  mobile elements- clinical cooelation indicated.  Normal  right ventricular size with decreased right ventricular  systolic function. Tricuspid valve not well visualized.  Normal pulmonic valve. Mild-moderate pulmonic  regurgitation.  Pericardium/Pleura: Normal pericardium with no pericardial  effusion.  Hemodynamic: Estimated right atrial pressure is 8 mm Hg.  Estimated right ventricular systolic pressure equals 46 mm  Hg, assuming right atrial pressure equals 8 mm Hg,  consistent with mild pulmonary hypertension.  ------------------------------------------------------------------------  Conclusions:  1. Mitral annular calcification;  Tethered mitral valve  leaflets with normal opening. Moderate mitral  regurgitation.  2. Calcified trileaflet aortic valve with decreased  opening. Peak transaortic valve gradient equals 6 mm Hg,  mean transaortic valve gradient equals 3 mm Hg, estimated  aortic valve area equals 1.7 sqcm (by continuity equation),  aortic valve velocity time integral equals 29 cm,  consistent with mild aortic stenosis. Mild aortic  regurgitation.  3. Severe segmental left ventricular systolic dysfunction.  The function is best preserved at basal to mid lateral  wall, basal inferolateral wal and basal anterior wall; all  remaining segments are hypokinetic.  4. Moderate diastolic dysfunction  5. A device wire is noted in the right heart. There appears  to be thickening on wire with a possible mobile elements-  clinical cooelation indicated.  6. Normal right ventricular size with decreased right  ventricular systolic function.  *** No previous Echo exam.    < end of copied text >        =========================================================================  ASSESSMENT:  89yMale with a history of Prostate CA, PPM with treatment for lead thrombus, HTN, HFrEF presenting with dyspnea, orthopnea - c/w acute decompensated systolic CHF.  Still volume up with prominent JVD, crackles, LE edema  carvedilol 6.25 milliGRAM(s) Oral every 12 hours  furosemide   Injectable 80 milliGRAM(s) IV Push two times a day  influenza   Vaccine 0.5 milliLiter(s) IntraMuscular once      Recommendations  - Continue Lasix IV BID - responding well; although has 2-3 more days of diuresis likely  - will re-assess in AM if Metolazone to be given  - Continue coreg - reduce to 3.125  - continue warfarin for PPM thrombus  - Will follow        Please call with questions.     Lewis Woods MD, [facility]  [phone]
Patient is a 89y old  Male who presents with a chief complaint of 89M p/w 3d of "gasping for air" (22 Sep 2019 20:54)      SUBJECTIVE / OVERNIGHT EVENTS:    Events noted.  CONSTITUTIONAL: No fever,  or fatigue  RESPIRATORY: No cough, wheezing,  No shortness of breath  CARDIOVASCULAR: No chest pain, palpitations, dizziness,  GASTROINTESTINAL: No abdominal or epigastric pain.   NEUROLOGICAL: No headaches,     MEDICATIONS  (STANDING):  carvedilol 6.25 milliGRAM(s) Oral every 12 hours  influenza   Vaccine 0.5 milliLiter(s) IntraMuscular once    MEDICATIONS  (PRN):        CAPILLARY BLOOD GLUCOSE        I&O's Summary      PHYSICAL EXAM:    NECK: Supple, No JVD  CHEST/LUNG: Clear to auscultation bilaterally; No wheezing.  HEART: Regular rate and rhythm; No murmurs, rubs, or gallops  ABDOMEN: Soft, Nontender, Nondistended; Bowel sounds present  EXTREMITIES:   No edema  NEUROLOGY: AAO       LABS:                        10.3   5.93  )-----------( 191      ( 23 Sep 2019 08:37 )             33.5         140  |  107  |  43<H>  ----------------------------<  103<H>  4.7   |  21<L>  |  1.78<H>    Ca    9.3      23 Sep 2019 06:11  Phos  3.8       Mg     2.4         TPro  6.9  /  Alb  3.8  /  TBili  0.4  /  DBili  x   /  AST  25  /  ALT  13  /  AlkPhos  64      PT/INR - ( 23 Sep 2019 08:24 )   PT: 15.7 sec;   INR: 1.36 ratio         PTT - ( 23 Sep 2019 08:24 )  PTT:32.3 sec      Urinalysis Basic - ( 22 Sep 2019 18:50 )    Color: Colorless / Appearance: Clear / S.009 / pH: x  Gluc: x / Ketone: Negative  / Bili: Negative / Urobili: Negative   Blood: x / Protein: 30 mg/dL / Nitrite: Negative   Leuk Esterase: Negative / RBC: 7 /hpf / WBC 1 /HPF   Sq Epi: x / Non Sq Epi: 0 /hpf / Bacteria: Negative      CAPILLARY BLOOD GLUCOSE                    RADIOLOGY & ADDITIONAL TESTS:    Imaging Personally Reviewed:    Consultant(s) Notes Reviewed:      Care Discussed with Consultants/Other Providers:
Patient is a 89y old  Male who presents with a chief complaint of 89M p/w 3d of "gasping for air" (22 Sep 2019 20:54)      SUBJECTIVE / OVERNIGHT EVENTS:    Events noted.  CONSTITUTIONAL: No fever,  or fatigue  RESPIRATORY: No cough, wheezing,  No shortness of breath  CARDIOVASCULAR: No chest pain, palpitations, dizziness,  GASTROINTESTINAL: No abdominal or epigastric pain.   NEUROLOGICAL: No headaches,     MEDICATIONS  (STANDING):  carvedilol 6.25 milliGRAM(s) Oral every 12 hours  influenza   Vaccine 0.5 milliLiter(s) IntraMuscular once    MEDICATIONS  (PRN):        CAPILLARY BLOOD GLUCOSE        I&O's Summary      PHYSICAL EXAM:    NECK: Supple, No JVD  CHEST/LUNG: Clear to auscultation bilaterally; No wheezing.  HEART: Regular rate and rhythm; No murmurs, rubs, or gallops  ABDOMEN: Soft, Nontender, Nondistended; Bowel sounds present  EXTREMITIES:  Pedal edema  NEUROLOGY: AAO       LABS:                        10.3   5.93  )-----------( 191      ( 23 Sep 2019 08:37 )             33.5         140  |  107  |  43<H>  ----------------------------<  103<H>  4.7   |  21<L>  |  1.78<H>    Ca    9.3      23 Sep 2019 06:11  Phos  3.8       Mg     2.4         TPro  6.9  /  Alb  3.8  /  TBili  0.4  /  DBili  x   /  AST  25  /  ALT  13  /  AlkPhos  64      PT/INR - ( 23 Sep 2019 08:24 )   PT: 15.7 sec;   INR: 1.36 ratio         PTT - ( 23 Sep 2019 08:24 )  PTT:32.3 sec      Urinalysis Basic - ( 22 Sep 2019 18:50 )    Color: Colorless / Appearance: Clear / S.009 / pH: x  Gluc: x / Ketone: Negative  / Bili: Negative / Urobili: Negative   Blood: x / Protein: 30 mg/dL / Nitrite: Negative   Leuk Esterase: Negative / RBC: 7 /hpf / WBC 1 /HPF   Sq Epi: x / Non Sq Epi: 0 /hpf / Bacteria: Negative      CAPILLARY BLOOD GLUCOSE                    RADIOLOGY & ADDITIONAL TESTS:    Imaging Personally Reviewed:    Consultant(s) Notes Reviewed:      Care Discussed with Consultants/Other Providers:
Patient is a 89y old  Male who presents with a chief complaint of 89M p/w 3d of "gasping for air" (24 Sep 2019 23:18)      SUBJECTIVE / OVERNIGHT EVENTS:    Events noted.  CONSTITUTIONAL: No fever,  or fatigue  RESPIRATORY: No cough, wheezing,  No shortness of breath  CARDIOVASCULAR: No chest pain, palpitations, dizziness, or leg swelling  GASTROINTESTINAL: No abdominal or epigastric pain. No nausea, vomiting.  NEUROLOGICAL: No headaches,     MEDICATIONS  (STANDING):  carvedilol 3.125 milliGRAM(s) Oral every 12 hours  furosemide   Injectable 80 milliGRAM(s) IV Push two times a day  influenza   Vaccine 0.5 milliLiter(s) IntraMuscular once    MEDICATIONS  (PRN):        CAPILLARY BLOOD GLUCOSE        I&O's Summary    24 Sep 2019 07:01  -  25 Sep 2019 07:00  --------------------------------------------------------  IN: 240 mL / OUT: 700 mL / NET: -460 mL        PHYSICAL EXAM:  GENERAL: NAD  NECK: Supple, No JVD  CHEST/LUNG: Clear to auscultation bilaterally; No wheezing.  HEART: Regular rate and rhythm; No murmurs, rubs, or gallops  ABDOMEN: Soft, Nontender, Nondistended; Bowel sounds present  EXTREMITIES:   No edema  NEUROLOGY: AAO X 3      LABS:                        10.6   5.73  )-----------( 205      ( 25 Sep 2019 09:02 )             34.1     09-25    140  |  101  |  47<H>  ----------------------------<  103<H>  3.4<L>   |  24  |  1.57<H>    Ca    8.8      25 Sep 2019 05:57  Phos  4.1     09-25  Mg     2.2     09-25    TPro  6.7  /  Alb  3.5  /  TBili  0.5  /  DBili  x   /  AST  26  /  ALT  13  /  AlkPhos  64  09-24    PT/INR - ( 25 Sep 2019 08:45 )   PT: 18.6 sec;   INR: 1.61 ratio         PTT - ( 25 Sep 2019 08:45 )  PTT:31.4 sec        CAPILLARY BLOOD GLUCOSE                    RADIOLOGY & ADDITIONAL TESTS:    Imaging Personally Reviewed:    Consultant(s) Notes Reviewed:      Care Discussed with Consultants/Other Providers:
Patient is a 89y old  Male who presents with a chief complaint of 89M p/w 3d of "gasping for air" (25 Sep 2019 23:35)      SUBJECTIVE / OVERNIGHT EVENTS:    Events noted.  CONSTITUTIONAL: No fever,  or fatigue  RESPIRATORY: No cough, wheezing,  No shortness of breath  CARDIOVASCULAR: No chest pain, palpitations, dizziness, or leg swelling  GASTROINTESTINAL: No abdominal or epigastric pain. No nausea, vomiting.  NEUROLOGICAL: No headaches,     MEDICATIONS  (STANDING):  carvedilol 3.125 milliGRAM(s) Oral every 12 hours  furosemide   Injectable 80 milliGRAM(s) IV Push two times a day  influenza   Vaccine 0.5 milliLiter(s) IntraMuscular once    MEDICATIONS  (PRN):        CAPILLARY BLOOD GLUCOSE        I&O's Summary    25 Sep 2019 07:01  -  26 Sep 2019 07:00  --------------------------------------------------------  IN: 240 mL / OUT: 0 mL / NET: 240 mL    26 Sep 2019 07:01  -  26 Sep 2019 13:55  --------------------------------------------------------  IN: 120 mL / OUT: 0 mL / NET: 120 mL        PHYSICAL EXAM:  GENERAL: NAD  NECK: Supple, No JVD  CHEST/LUNG: Clear to auscultation bilaterally; No wheezing.  HEART: Regular rate and rhythm; No murmurs, rubs, or gallops  ABDOMEN: Soft, Nontender, Nondistended; Bowel sounds present  EXTREMITIES:   No edema  NEUROLOGY: AAO X 3      LABS:                        11.6   6.21  )-----------( 239      ( 26 Sep 2019 07:45 )             36.7     09-26    142  |  101  |  41<H>  ----------------------------<  106<H>  4.6   |  28  |  1.43<H>    Ca    8.9      26 Sep 2019 05:37  Phos  3.5     09-26  Mg     2.2     09-26      PT/INR - ( 26 Sep 2019 08:29 )   PT: 24.8 sec;   INR: 2.13 ratio         PTT - ( 25 Sep 2019 08:45 )  PTT:31.4 sec        CAPILLARY BLOOD GLUCOSE                    RADIOLOGY & ADDITIONAL TESTS:    Imaging Personally Reviewed:    Consultant(s) Notes Reviewed:      Care Discussed with Consultants/Other Providers:
Patient is a 89y old  Male who presents with a chief complaint of 89M p/w 3d of "gasping for air" (25 Sep 2019 23:35)      SUBJECTIVE / OVERNIGHT EVENTS:    Events noted.  CONSTITUTIONAL: No fever,  or fatigue  RESPIRATORY: No cough, wheezing,  No shortness of breath  CARDIOVASCULAR: No chest pain, palpitations, dizziness, or leg swelling  GASTROINTESTINAL: No abdominal or epigastric pain. No nausea, vomiting.  NEUROLOGICAL: No headaches,     MEDICATIONS  (STANDING):  carvedilol 3.125 milliGRAM(s) Oral every 12 hours  furosemide   Injectable 80 milliGRAM(s) IV Push two times a day  influenza   Vaccine 0.5 milliLiter(s) IntraMuscular once    MEDICATIONS  (PRN):        CAPILLARY BLOOD GLUCOSE        I&O's Summary    25 Sep 2019 07:01  -  26 Sep 2019 07:00  --------------------------------------------------------  IN: 240 mL / OUT: 0 mL / NET: 240 mL    26 Sep 2019 07:01  -  26 Sep 2019 13:55  --------------------------------------------------------  IN: 120 mL / OUT: 0 mL / NET: 120 mL        PHYSICAL EXAM:  GENERAL: NAD  NECK: Supple, No JVD  CHEST/LUNG: Clear to auscultation bilaterally; No wheezing.  HEART: Regular rate and rhythm; No murmurs, rubs, or gallops  ABDOMEN: Soft, Nontender, Nondistended; Bowel sounds present  EXTREMITIES:   No edema  NEUROLOGY: AAO X 3      LABS:                        11.6   6.21  )-----------( 239      ( 26 Sep 2019 07:45 )             36.7     09-26    142  |  101  |  41<H>  ----------------------------<  106<H>  4.6   |  28  |  1.43<H>    Ca    8.9      26 Sep 2019 05:37  Phos  3.5     09-26  Mg     2.2     09-26      PT/INR - ( 26 Sep 2019 08:29 )   PT: 24.8 sec;   INR: 2.13 ratio         PTT - ( 25 Sep 2019 08:45 )  PTT:31.4 sec        CAPILLARY BLOOD GLUCOSE                    RADIOLOGY & ADDITIONAL TESTS:    Imaging Personally Reviewed:    Consultant(s) Notes Reviewed:      Care Discussed with Consultants/Other Providers:

## 2019-10-31 PROCEDURE — 82330 ASSAY OF CALCIUM: CPT

## 2019-10-31 PROCEDURE — 85610 PROTHROMBIN TIME: CPT

## 2019-10-31 PROCEDURE — 80053 COMPREHEN METABOLIC PANEL: CPT

## 2019-10-31 PROCEDURE — 76770 US EXAM ABDO BACK WALL COMP: CPT

## 2019-10-31 PROCEDURE — 93306 TTE W/DOPPLER COMPLETE: CPT

## 2019-10-31 PROCEDURE — 82565 ASSAY OF CREATININE: CPT

## 2019-10-31 PROCEDURE — 82947 ASSAY GLUCOSE BLOOD QUANT: CPT

## 2019-10-31 PROCEDURE — 84484 ASSAY OF TROPONIN QUANT: CPT

## 2019-10-31 PROCEDURE — 84295 ASSAY OF SERUM SODIUM: CPT

## 2019-10-31 PROCEDURE — 84100 ASSAY OF PHOSPHORUS: CPT

## 2019-10-31 PROCEDURE — 83735 ASSAY OF MAGNESIUM: CPT

## 2019-10-31 PROCEDURE — 85027 COMPLETE CBC AUTOMATED: CPT

## 2019-10-31 PROCEDURE — 83605 ASSAY OF LACTIC ACID: CPT

## 2019-10-31 PROCEDURE — 93005 ELECTROCARDIOGRAM TRACING: CPT

## 2019-10-31 PROCEDURE — 82803 BLOOD GASES ANY COMBINATION: CPT

## 2019-10-31 PROCEDURE — 84132 ASSAY OF SERUM POTASSIUM: CPT

## 2019-10-31 PROCEDURE — 82435 ASSAY OF BLOOD CHLORIDE: CPT

## 2019-10-31 PROCEDURE — 81001 URINALYSIS AUTO W/SCOPE: CPT

## 2019-10-31 PROCEDURE — 85730 THROMBOPLASTIN TIME PARTIAL: CPT

## 2019-10-31 PROCEDURE — 71045 X-RAY EXAM CHEST 1 VIEW: CPT

## 2019-10-31 PROCEDURE — 80048 BASIC METABOLIC PNL TOTAL CA: CPT

## 2019-10-31 PROCEDURE — 96374 THER/PROPH/DIAG INJ IV PUSH: CPT

## 2019-10-31 PROCEDURE — 85014 HEMATOCRIT: CPT

## 2019-10-31 PROCEDURE — 93308 TTE F-UP OR LMTD: CPT

## 2019-10-31 PROCEDURE — 83880 ASSAY OF NATRIURETIC PEPTIDE: CPT

## 2019-10-31 PROCEDURE — 99285 EMERGENCY DEPT VISIT HI MDM: CPT | Mod: 25

## 2019-11-01 ENCOUNTER — OUTPATIENT (OUTPATIENT)
Dept: OUTPATIENT SERVICES | Facility: HOSPITAL | Age: 84
LOS: 1 days | End: 2019-11-01

## 2019-11-01 DIAGNOSIS — Z95.0 PRESENCE OF CARDIAC PACEMAKER: Chronic | ICD-10-CM

## 2019-11-15 ENCOUNTER — INPATIENT (INPATIENT)
Facility: HOSPITAL | Age: 84
LOS: 6 days | Discharge: HOME | DRG: 291 | End: 2019-11-22
Attending: INTERNAL MEDICINE | Admitting: INTERNAL MEDICINE
Payer: MEDICAID

## 2019-11-15 VITALS
WEIGHT: 138.89 LBS | SYSTOLIC BLOOD PRESSURE: 130 MMHG | RESPIRATION RATE: 22 BRPM | TEMPERATURE: 98 F | DIASTOLIC BLOOD PRESSURE: 72 MMHG | HEIGHT: 64 IN | OXYGEN SATURATION: 98 % | HEART RATE: 74 BPM

## 2019-11-15 DIAGNOSIS — Z29.9 ENCOUNTER FOR PROPHYLACTIC MEASURES, UNSPECIFIED: ICD-10-CM

## 2019-11-15 DIAGNOSIS — E87.70 FLUID OVERLOAD, UNSPECIFIED: ICD-10-CM

## 2019-11-15 DIAGNOSIS — T82.9XXS UNSPECIFIED COMPLICATION OF CARDIAC AND VASCULAR PROSTHETIC DEVICE, IMPLANT AND GRAFT, SEQUELA: ICD-10-CM

## 2019-11-15 DIAGNOSIS — C61 MALIGNANT NEOPLASM OF PROSTATE: ICD-10-CM

## 2019-11-15 DIAGNOSIS — R41.0 DISORIENTATION, UNSPECIFIED: ICD-10-CM

## 2019-11-15 DIAGNOSIS — I50.23 ACUTE ON CHRONIC SYSTOLIC (CONGESTIVE) HEART FAILURE: ICD-10-CM

## 2019-11-15 DIAGNOSIS — N18.3 CHRONIC KIDNEY DISEASE, STAGE 3 (MODERATE): ICD-10-CM

## 2019-11-15 DIAGNOSIS — Z95.0 PRESENCE OF CARDIAC PACEMAKER: Chronic | ICD-10-CM

## 2019-11-15 DIAGNOSIS — G47.00 INSOMNIA, UNSPECIFIED: ICD-10-CM

## 2019-11-15 DIAGNOSIS — Z02.9 ENCOUNTER FOR ADMINISTRATIVE EXAMINATIONS, UNSPECIFIED: ICD-10-CM

## 2019-11-15 PROBLEM — I50.9 HEART FAILURE, UNSPECIFIED: Chronic | Status: ACTIVE | Noted: 2019-09-22

## 2019-11-15 LAB
ALBUMIN SERPL ELPH-MCNC: 3.8 G/DL — SIGNIFICANT CHANGE UP (ref 3.3–5)
ALP SERPL-CCNC: 67 U/L — SIGNIFICANT CHANGE UP (ref 40–120)
ALT FLD-CCNC: 12 U/L — SIGNIFICANT CHANGE UP (ref 10–45)
ANION GAP SERPL CALC-SCNC: 10 MMOL/L — SIGNIFICANT CHANGE UP (ref 5–17)
APTT BLD: 36.4 SEC — HIGH (ref 27.5–36.3)
AST SERPL-CCNC: 22 U/L — SIGNIFICANT CHANGE UP (ref 10–40)
BILIRUB SERPL-MCNC: 0.5 MG/DL — SIGNIFICANT CHANGE UP (ref 0.2–1.2)
BUN SERPL-MCNC: 35 MG/DL — HIGH (ref 7–23)
CALCIUM SERPL-MCNC: 9.1 MG/DL — SIGNIFICANT CHANGE UP (ref 8.4–10.5)
CHLORIDE SERPL-SCNC: 103 MMOL/L — SIGNIFICANT CHANGE UP (ref 96–108)
CO2 SERPL-SCNC: 24 MMOL/L — SIGNIFICANT CHANGE UP (ref 22–31)
CREAT SERPL-MCNC: 1.74 MG/DL — HIGH (ref 0.5–1.3)
GLUCOSE SERPL-MCNC: 142 MG/DL — HIGH (ref 70–99)
HCT VFR BLD CALC: 33.2 % — LOW (ref 39–50)
HGB BLD-MCNC: 10.3 G/DL — LOW (ref 13–17)
INR BLD: 2.53 RATIO — HIGH (ref 0.88–1.16)
LIDOCAIN IGE QN: 66 U/L — HIGH (ref 7–60)
MAGNESIUM SERPL-MCNC: 2.2 MG/DL — SIGNIFICANT CHANGE UP (ref 1.6–2.6)
MCHC RBC-ENTMCNC: 28.1 PG — SIGNIFICANT CHANGE UP (ref 27–34)
MCHC RBC-ENTMCNC: 31 GM/DL — LOW (ref 32–36)
MCV RBC AUTO: 90.5 FL — SIGNIFICANT CHANGE UP (ref 80–100)
NRBC # BLD: 0 /100 WBCS — SIGNIFICANT CHANGE UP (ref 0–0)
NT-PROBNP SERPL-SCNC: HIGH PG/ML (ref 0–300)
PLATELET # BLD AUTO: 203 K/UL — SIGNIFICANT CHANGE UP (ref 150–400)
POTASSIUM SERPL-MCNC: 4 MMOL/L — SIGNIFICANT CHANGE UP (ref 3.5–5.3)
POTASSIUM SERPL-SCNC: 4 MMOL/L — SIGNIFICANT CHANGE UP (ref 3.5–5.3)
PROT SERPL-MCNC: 7.1 G/DL — SIGNIFICANT CHANGE UP (ref 6–8.3)
PROTHROM AB SERPL-ACNC: 29.7 SEC — HIGH (ref 10–12.9)
RBC # BLD: 3.67 M/UL — LOW (ref 4.2–5.8)
RBC # FLD: 15.7 % — HIGH (ref 10.3–14.5)
SODIUM SERPL-SCNC: 137 MMOL/L — SIGNIFICANT CHANGE UP (ref 135–145)
TROPONIN T, HIGH SENSITIVITY RESULT: 57 NG/L — HIGH (ref 0–51)
TROPONIN T, HIGH SENSITIVITY RESULT: 67 NG/L — HIGH (ref 0–51)
WBC # BLD: 5.39 K/UL — SIGNIFICANT CHANGE UP (ref 3.8–10.5)
WBC # FLD AUTO: 5.39 K/UL — SIGNIFICANT CHANGE UP (ref 3.8–10.5)

## 2019-11-15 PROCEDURE — 71045 X-RAY EXAM CHEST 1 VIEW: CPT | Mod: 26

## 2019-11-15 PROCEDURE — 70450 CT HEAD/BRAIN W/O DYE: CPT | Mod: 26

## 2019-11-15 PROCEDURE — 93308 TTE F-UP OR LMTD: CPT | Mod: 26

## 2019-11-15 PROCEDURE — 99285 EMERGENCY DEPT VISIT HI MDM: CPT

## 2019-11-15 PROCEDURE — 93010 ELECTROCARDIOGRAM REPORT: CPT

## 2019-11-15 PROCEDURE — 99223 1ST HOSP IP/OBS HIGH 75: CPT

## 2019-11-15 RX ORDER — FUROSEMIDE 40 MG
80 TABLET ORAL
Refills: 0 | Status: DISCONTINUED | OUTPATIENT
Start: 2019-11-15 | End: 2019-11-22

## 2019-11-15 RX ORDER — QUETIAPINE FUMARATE 200 MG/1
12.5 TABLET, FILM COATED ORAL EVERY 12 HOURS
Refills: 0 | Status: DISCONTINUED | OUTPATIENT
Start: 2019-11-15 | End: 2019-11-22

## 2019-11-15 RX ORDER — BUMETANIDE 0.25 MG/ML
1 INJECTION INTRAMUSCULAR; INTRAVENOUS ONCE
Refills: 0 | Status: COMPLETED | OUTPATIENT
Start: 2019-11-15 | End: 2019-11-15

## 2019-11-15 RX ORDER — QUETIAPINE FUMARATE 200 MG/1
25 TABLET, FILM COATED ORAL AT BEDTIME
Refills: 0 | Status: DISCONTINUED | OUTPATIENT
Start: 2019-11-15 | End: 2019-11-15

## 2019-11-15 RX ORDER — LANOLIN ALCOHOL/MO/W.PET/CERES
3 CREAM (GRAM) TOPICAL AT BEDTIME
Refills: 0 | Status: DISCONTINUED | OUTPATIENT
Start: 2019-11-15 | End: 2019-11-22

## 2019-11-15 RX ORDER — CARVEDILOL PHOSPHATE 80 MG/1
6.25 CAPSULE, EXTENDED RELEASE ORAL EVERY 12 HOURS
Refills: 0 | Status: DISCONTINUED | OUTPATIENT
Start: 2019-11-15 | End: 2019-11-22

## 2019-11-15 RX ORDER — RISPERIDONE 4 MG/1
0.5 TABLET ORAL
Refills: 0 | Status: DISCONTINUED | OUTPATIENT
Start: 2019-11-15 | End: 2019-11-15

## 2019-11-15 RX ORDER — QUETIAPINE FUMARATE 200 MG/1
12.5 TABLET, FILM COATED ORAL
Refills: 0 | Status: DISCONTINUED | OUTPATIENT
Start: 2019-11-15 | End: 2019-11-15

## 2019-11-15 RX ORDER — WARFARIN SODIUM 2.5 MG/1
6 TABLET ORAL ONCE
Refills: 0 | Status: COMPLETED | OUTPATIENT
Start: 2019-11-15 | End: 2019-11-15

## 2019-11-15 RX ADMIN — BUMETANIDE 1 MILLIGRAM(S): 0.25 INJECTION INTRAMUSCULAR; INTRAVENOUS at 17:23

## 2019-11-15 RX ADMIN — Medication 3 MILLIGRAM(S): at 22:42

## 2019-11-15 RX ADMIN — WARFARIN SODIUM 6 MILLIGRAM(S): 2.5 TABLET ORAL at 22:42

## 2019-11-15 NOTE — H&P ADULT - PROBLEM SELECTOR PLAN 4
- pt has PPM thrombus ; on Coumadin 7mg qd  - INR 2.5 today  - will dose 6mg tonight  - daily PT/INR

## 2019-11-15 NOTE — H&P ADULT - NSHPSOCIALHISTORY_GEN_ALL_CORE
Lives with spouse in his son's home  Pt's son Alexandre Boyd is primary care giver  Denies tobacco/Etoh/Illicit drug use

## 2019-11-15 NOTE — ED PROVIDER NOTE - PHYSICAL EXAMINATION
Attending Crystal Montes: Gen: NAD, heent: atrauamtic, eomi, perrla, mmm, op pink, uvula midline, neck; nttp, no nuchal rigidity, chest: nttp, no crepitus, cv: rrr, +murmur, lungs: decreased at bases b/l, abd: soft, nontender, nondistended, no peritoneal signs, +BS, no guarding, ext: wwp,bl LE pitting edema skin: no rash, neuro: awake and alert, following commands, speech clear, sensation and strength intact, no focal deficits

## 2019-11-15 NOTE — H&P ADULT - ASSESSMENT
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 He is admitted for CHF exacerbation. Also found to have AMS.

## 2019-11-15 NOTE — H&P ADULT - PROBLEM SELECTOR PLAN 8
Transitions of Care Status:  1.  Name of PCP: Ike Fisher  2.  PCP Contacted on Admission: [ X ] Y    [ ] N    3.  PCP contacted at Discharge: [ ] Y    [ ] N    [ ] N/A  4.  Post-Discharge Appointment Date and Location:  5.  Summary of Handoff given to PCP:

## 2019-11-15 NOTE — H&P ADULT - NSHPSOURCEINFOTX_GEN_ALL_CORE
Pt is a poor historian on account of AMS ; hx obtained from his son Alexandre Boyd by phone and from PCP Dr Fisher

## 2019-11-15 NOTE — ED ADULT NURSE NOTE - NSIMPLEMENTINTERV_GEN_ALL_ED
Implemented All Fall with Harm Risk Interventions:  [place] to call system. Call bell, personal items and telephone within reach. Instruct patient to call for assistance. Room bathroom lighting operational. Non-slip footwear when patient is off stretcher. Physically safe environment: no spills, clutter or unnecessary equipment. Stretcher in lowest position, wheels locked, appropriate side rails in place. Provide visual cue, wrist band, yellow gown, etc. Monitor gait and stability. Monitor for mental status changes and reorient to person, place, and time. Review medications for side effects contributing to fall risk. Reinforce activity limits and safety measures with patient and family. Provide visual clues: red socks.

## 2019-11-15 NOTE — H&P ADULT - PROBLEM SELECTOR PLAN 1
- presenting with b/l LE edema, weight gain  - reports compliance with medications  - will start Lasix 80mg IVP bid for now  - strict I/O monitoring  - daily weights  - Cardiology consult

## 2019-11-15 NOTE — ED PROVIDER NOTE - NS ED ROS FT
General: 20lb weight gain over 3 days; denies fever, chills  HENT: denies nasal congestion, sore throat, rhinorrhea, ear pain  Eyes: denies visual changes, blurred vision, eye discharge, eye redness  Neck: denies neck pain, neck swelling  CV: denies chest pain, palpitations  Resp: denies difficulty breathing, cough  Abdominal: +abdominal distention; denies nausea, vomiting, diarrhea, blood in stool, dark stool  MSK: b/l LE swelling; denies muscle aches, bony pain, leg pain  Neuro: denies headaches, numbness, tingling, dizziness, lightheadedness.  Skin: denies rashes, cuts, bruises  Hematologic: denies unexplained bruises

## 2019-11-15 NOTE — ED ADULT NURSE REASSESSMENT NOTE - NS ED NURSE REASSESS COMMENT FT1
Pt AAOx4, NAD, resp nonlabored, resting comfortably in bed with family at bedside.  Pt denies headache, dizziness, chest pain, palpitations, SOB, abd pain, n/v/d, urinary symptoms, fevers, chills, weakness at this time. Safety maintained. will reassess

## 2019-11-15 NOTE — H&P ADULT - PROBLEM SELECTOR PLAN 2
- pt is AAO x 3 at baseline per his son and PCP  - now appears agitated ; attempting to climb out of bed   - will start Risperdal 0.5mg po bid prn for agitation  - will avoid Seroquel in the setting of prolonged qtc 522  - check CT head  - fall precautions - pt is AAO x 3 at baseline per his son and PCP  - now appears agitated ; attempting to climb out of bed   - Seroquel 12.5mg po q 12 prn for agitation  - check CT head  - fall precautions

## 2019-11-15 NOTE — ED PROVIDER NOTE - CARE PLAN
Principal Discharge DX:	Fluid overload, unspecified  Secondary Diagnosis:	Lower extremity edema  Secondary Diagnosis:	Abdominal distention

## 2019-11-15 NOTE — ED PROVIDER NOTE - OBJECTIVE STATEMENT
Attending Crystal Montes: 89 y/o male with h/o chf on bumex and coumadin presenting with increased LE swelling. per family member pt has gained approximately 20lbs in the last few days. no changes in diet and has intermittent changes in urine output. denies any chest pain or difficulty breathing. does report increased LE edema. no fevers or chills. denies any cough. has been taking all of his medications as prescribed. no syncopal episodes. no black or bloody stools. saw dr cuellar today and sent ot the ed

## 2019-11-15 NOTE — H&P ADULT - NSHPLABSRESULTS_GEN_ALL_CORE
Labs reviewed : bnp [id number] trop 67, 57 h/h 10/33 bun/cr bun/cr 35/1.7 inr 2.5      CXR  personally reviewed : Unchanged small bilateral pleural effusions.      EKG personally reviewed :  Atrial sensed ventricular paced rhythm w/prolonged AV conduction , QTc 522    TTE 9/26/2019  EF (Visual Estimate): 30-35 %  1. Mitral annular calcification. Tethered and thickened  mitral leaflets with normal diastolic opening. Mild mitral  regurgitation.  2. Calcified trileaflet aortic valve with normal opening.  No aortic valve regurgitation seen.  3. Moderate to severe segmental left ventricular systolic  dysfunction. The mid to distal septum, basal inferior,  distal inferior, distal segments, and the apex are  akinetic.  4. Moderate diastolic dysfunction(Stage II).  5. Mild riight ventricular enlargement with normal right  ventricular systolic function. A device wire is noted in  the right heart. There is thickening of the device wire  within the RV with mobile elements. Clinical correlation  advised. Labs reviewed : bnp [id number] trop 67, 57 h/h 10/33 bun/cr bun/cr 35/1.7 inr 2.5      CXR  personally reviewed : Unchanged small bilateral pleural effusions.      EKG personally reviewed :  Atrial sensed ventricular paced rhythm w/prolonged AV conduction     TTE 9/26/2019  EF (Visual Estimate): 30-35 %  1. Mitral annular calcification. Tethered and thickened  mitral leaflets with normal diastolic opening. Mild mitral  regurgitation.  2. Calcified trileaflet aortic valve with normal opening.  No aortic valve regurgitation seen.  3. Moderate to severe segmental left ventricular systolic  dysfunction. The mid to distal septum, basal inferior,  distal inferior, distal segments, and the apex are  akinetic.  4. Moderate diastolic dysfunction(Stage II).  5. Mild riight ventricular enlargement with normal right  ventricular systolic function. A device wire is noted in  the right heart. There is thickening of the device wire  within the RV with mobile elements. Clinical correlation  advised.

## 2019-11-15 NOTE — ED PROVIDER NOTE - PROGRESS NOTE DETAILS
Marquez PGY1 - Spoke to Dr. Fisher, who stated pt. can be admitted to Dr. Solomon at the end of all w/u for inpatient diuresis. Attending Crystal Montes: troponin mildly elevated. pt denies any chest pain. does have mild LUZ which could be contributing. will continue to trend Marquez PGY1 - Pt. will be admitted to Dr. Solomon.  Pt. aware and agrees w/ the plan.  All other questions and concerns have been addressed.

## 2019-11-15 NOTE — H&P ADULT - PROBLEM SELECTOR PLAN 5
- per PCP pt was undergoing treatment in [place]   - pt has been unable to provide medical records re: prostate Ca treatment

## 2019-11-15 NOTE — ED PROVIDER NOTE - ATTENDING CONTRIBUTION TO CARE
Attending MD Crystal Montes:  I personally have seen and examined this patient.  Resident note reviewed and agree on plan of care and except where noted.  See HPI, PE, and MDM for details.

## 2019-11-15 NOTE — H&P ADULT - HISTORY OF PRESENT ILLNESS
89 y/o male with h/o chf on bumex and coumadin presenting with increased LE swelling. per family member pt has gained approximately 20lbs in the last few days. no changes in diet and has intermittent changes in urine output. denies any chest pain or difficulty breathing. does report increased LE edema. no fevers or chills. denies any cough. has been taking all of his medications as prescribed. 89M  with h/o HFrEF,  and coumadin presenting with increased LE swelling. per family member pt has gained approximately 20lbs in the last few days. no changes in diet and has intermittent changes in urine output. denies any chest pain or difficulty breathing. does report increased LE edema. no fevers or chills. denies any cough. has been taking all of his medications as prescribed. 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 Per pt's son  pt has gained approximately 20lbs in the last few days. He reports that pt has been compliant with all medications including Bumex. He also reports compliance  with low salt diet and fluid restriction. Per informant pt has not had chest pain, sob, cough, fever/chills. Per informant pt has no h/o dementia and is AAO x 3 at baseline. Pt does have insomnia.    ED COURSE  VS : 130/72   74  22 O2 98%RA T 97.5F   Labs : bnp [id number] trop 67, 57 h/h 10/33 bun/cr bun/cr 35/1.7 inr 2.5  Treatment : Bumex 1mg ivp x 1

## 2019-11-15 NOTE — ED ADULT NURSE NOTE - OBJECTIVE STATEMENT
89yr old male with pmhx of CHF, and Pace maker placement presents to ED c/o B/L Pitting edema of lower extremities. Pts son states pt gets SOB upon ambulation, and his legs vicky 89yr old male with pmhx of CHF, and Pace maker placement presents to ED c/o B/L Pitting edema of lower extremities. Pts son states pt gets SOB upon ambulation, and both lower legs became swollen in the past 3 days. Pts son states pt gained 20lbs in 3 days, pt went to see his PCP today when he was advised to come to the ED. B/L pitting edema +2 noted on LE up to the lower thigh. Pt complains of pain when LE pressed. Pt placed on CM, assessed by MD, Bed is lowered, locked and call bell is within reach, family is at bedside. will reassess.

## 2019-11-15 NOTE — ED PROVIDER NOTE - CLINICAL SUMMARY MEDICAL DECISION MAKING FREE TEXT BOX
Pt. is a 89 y.o. M p/w 20 pound weight gain over the last 3 days.  Likely fluid retention.  Ultrasound revealed poor cardiac squeeze.  Will get labs, CXR, and admit.  Pt.'s PCP, Dr. Ike Fisher already aware. Pt. is a 89 y.o. M p/w 20 pound weight gain over the last 3 days.  Likely fluid retention.  Ultrasound revealed poor cardiac squeeze.  Will get labs, CXR, and admit.  Pt.'s PCP, Dr. Ike Fisher already aware.  Attending Crystal Montes: 89 y/o male with h/o CHF, s/p pacemaker placement with thrombus on coumadin presenting with increased LE swelling and weight gain. likely fluid overload. INR therapeutic making b/l DVT less likely. will give IV diuretic, monitoring renal function and admit.

## 2019-11-16 LAB
ANION GAP SERPL CALC-SCNC: 15 MMOL/L — SIGNIFICANT CHANGE UP (ref 5–17)
APTT BLD: 36.7 SEC — HIGH (ref 27.5–36.3)
BUN SERPL-MCNC: 42 MG/DL — HIGH (ref 7–23)
CALCIUM SERPL-MCNC: 8.8 MG/DL — SIGNIFICANT CHANGE UP (ref 8.4–10.5)
CHLORIDE SERPL-SCNC: 101 MMOL/L — SIGNIFICANT CHANGE UP (ref 96–108)
CO2 SERPL-SCNC: 21 MMOL/L — LOW (ref 22–31)
CREAT SERPL-MCNC: 1.78 MG/DL — HIGH (ref 0.5–1.3)
GLUCOSE SERPL-MCNC: 133 MG/DL — HIGH (ref 70–99)
INR BLD: 2.75 RATIO — HIGH (ref 0.88–1.16)
POTASSIUM SERPL-MCNC: 4 MMOL/L — SIGNIFICANT CHANGE UP (ref 3.5–5.3)
POTASSIUM SERPL-SCNC: 4 MMOL/L — SIGNIFICANT CHANGE UP (ref 3.5–5.3)
PROTHROM AB SERPL-ACNC: 32.4 SEC — HIGH (ref 10–13.1)
SODIUM SERPL-SCNC: 137 MMOL/L — SIGNIFICANT CHANGE UP (ref 135–145)

## 2019-11-16 RX ORDER — INFLUENZA VIRUS VACCINE 15; 15; 15; 15 UG/.5ML; UG/.5ML; UG/.5ML; UG/.5ML
0.5 SUSPENSION INTRAMUSCULAR ONCE
Refills: 0 | Status: DISCONTINUED | OUTPATIENT
Start: 2019-11-16 | End: 2019-11-22

## 2019-11-16 RX ORDER — WARFARIN SODIUM 2.5 MG/1
5 TABLET ORAL ONCE
Refills: 0 | Status: COMPLETED | OUTPATIENT
Start: 2019-11-16 | End: 2019-11-16

## 2019-11-16 RX ADMIN — CARVEDILOL PHOSPHATE 6.25 MILLIGRAM(S): 80 CAPSULE, EXTENDED RELEASE ORAL at 05:15

## 2019-11-16 RX ADMIN — Medication 80 MILLIGRAM(S): at 17:10

## 2019-11-16 RX ADMIN — Medication 3 MILLIGRAM(S): at 21:25

## 2019-11-16 RX ADMIN — WARFARIN SODIUM 5 MILLIGRAM(S): 2.5 TABLET ORAL at 21:25

## 2019-11-16 RX ADMIN — CARVEDILOL PHOSPHATE 6.25 MILLIGRAM(S): 80 CAPSULE, EXTENDED RELEASE ORAL at 17:08

## 2019-11-16 RX ADMIN — Medication 80 MILLIGRAM(S): at 05:16

## 2019-11-16 NOTE — PROGRESS NOTE ADULT - PROBLEM SELECTOR PLAN 1
- presenting with b/l LE edema, weight gain  - reports compliance with medications  - Lasix 80mg IVP bid for now  - strict I/O monitoring  - daily weights  - Cardiology consult

## 2019-11-16 NOTE — PROGRESS NOTE ADULT - PROBLEM SELECTOR PLAN 2
- pt is AAO x 3 at baseline per his son and PCP  - Seroquel 12.5mg po q 12 prn for agitation  -  CT head: No acute CVA  - fall precautions

## 2019-11-16 NOTE — PATIENT PROFILE ADULT - [facility] SENSORY
Regional Procedure  Technique:  Epidural  Labor and Delivery Epidural:  Yes  Catheter Type:  Epidural    Patient Preparation  Location: Labor and Delivery  Preanesthetic Checklist:  Patient Identified, Risks and Benefits Discussed, IV Bolus - Crystalloid, Patient &/Or Fetus Hemodynamically Stable, Timeout Performed and Monitors and Equipment Checked  Patient Position:  Sitting  Sedation:  None    Monitors Used:  Pulse Oximetry, NIBP and FHT's  Oxygen Supplement:  Room Air  Prep:  Chlorhexidine with alcohol  Max Sterile Barrier Technique:  Hand Washing, Cap/Mask, Sterile gloves and Sterile drape  Securement:  Tape    Regional Basics  Local Infiltration:  1% Lidocaine  Local Infiltration Volume:  3mL  Approach:  Midline    Block Details  Interspace:  L2-3  Ultrasound Used:  No  X-Ray Used:  No  Loss of resistance at:  5.5cm  with:  Air  Epidural needle type:  Tuohy  Epidural needle gauge:  17  Epidural needle length:  3.5 inCSF was not obtained.  Catheter:  Single Orifice  Catheter gauge:  19  Catheter Placement:  Easy  Catheter Distance at Skin:  11cm  Tunnel:  No  Catheter Fluid Return:  None  Test Dose:  Lidocaine 1.5% w/Epi  Test Dose Volume:  3 mLInitial Local Loading Anesthetic:  Ropivacaine  Initial Local Loading Concentration:  .2%  Initial Local Loading Volume:  10mL  Initial Local Narcotic Used:  Fentanyl 20 mcg    Intrathecal Narcotics    Labor Regional Notes  Inadvertent Dural Puncture:  No  Fetal Heart Tones Pre Procedure:  135 bpm  Fetal Heart Tones Post Procedure:  135 bpmParesthesia: noBlood: no    Regional Anesthesia Note   patient tolerated block very well no complications observed        
(4) no impairment

## 2019-11-16 NOTE — PATIENT PROFILE ADULT - PRO INTERPRETER NEED 2
Patient Education        Urinary Tract Infections in Men: Care Instructions  Your Care Instructions    A urinary tract infection, or UTI, is a general term for an infection anywhere between the kidneys and the tip of the penis. UTIs can also be a result of a prostate problem. Most cause pain or burning when you urinate. Most UTIs are caused by bacteria and can be cured with antibiotics. It is important to complete your treatment so that the infection does not get worse. Follow-up care is a key part of your treatment and safety. Be sure to make and go to all appointments, and call your doctor if you are having problems. It's also a good idea to know your test results and keep a list of the medicines you take. How can you care for yourself at home? · Take your antibiotics as prescribed. Do not stop taking them just because you feel better. You need to take the full course of antibiotics. · Take your medicines exactly as prescribed. Your doctor may have prescribed a medicine, such as phenazopyridine (Pyridium), to help relieve pain when you urinate. This turns your urine orange. You may stop taking it when your symptoms get better. But be sure to take all of your antibiotics, which treat the infection. · Drink extra water for the next day or two. This will help make the urine less concentrated and help wash out the bacteria causing the infection. (If you have kidney, heart, or liver disease and have to limit your fluids, talk with your doctor before you increase your fluid intake.)  · Avoid drinks that are carbonated or have caffeine. They can irritate the bladder. · Urinate often. Try to empty your bladder each time. · To relieve pain, take a hot bath or lay a heating pad (set on low) over your lower belly or genital area. Never go to sleep with a heating pad in place. To help prevent UTIs  · Drink plenty of fluids, enough so that your urine is light yellow or clear like water.  If you have kidney, heart, or English liver disease and have to limit fluids, talk with your doctor before you increase the amount of fluids you drink. · Urinate when you have the urge. Do not hold your urine for a long time. Urinate before you go to sleep. · Keep your penis clean. Catheter care  If you have a drainage tube (catheter) in place, the following steps will help you care for it. · Always wash your hands before and after touching your catheter. · Check the area around the urethra for inflammation or signs of infection. Signs of infection include irritated, swollen, red, or tender skin, or pus around the catheter. · Clean the area around the catheter with soap and water two times a day. Dry with a clean towel afterward. · Do not apply powder or lotion to the skin around the catheter. To empty the urine collection bag  · Wash your hands with soap and water. · Without touching the drain spout, remove the spout from its sleeve at the bottom of the collection bag. Open the valve on the spout. · Let the urine flow out of the bag and into the toilet or a container. Do not let the tubing or drain spout touch anything. · After you empty the bag, clean the end of the drain spout with tissue and water. Close the valve and put the drain spout back into its sleeve at the bottom of the collection bag. · Wash your hands with soap and water. When should you call for help? Call your doctor now or seek immediate medical care if:    · Symptoms such as a fever, chills, nausea, or vomiting get worse or happen for the first time.     · You have new pain in your back just below your rib cage. This is called flank pain.     · There is new blood or pus in your urine.     · You are not able to take or keep down your antibiotics.    Watch closely for changes in your health, and be sure to contact your doctor if:    · You are not getting better after taking an antibiotic for 2 days.     · Your symptoms go away but then come back.    Where can you learn more?  Go to http://nato-kenneth.info/. Enter A990 in the search box to learn more about \"Urinary Tract Infections in Men: Care Instructions. \"  Current as of: March 20, 2018  Content Version: 11.9  © 4201-5688 [employer]. Care instructions adapted under license by [employer] (which disclaims liability or warranty for this information). If you have questions about a medical condition or this instruction, always ask your healthcare professional. [place] [address] any warranty or liability for your use of this information.

## 2019-11-17 LAB
ANION GAP SERPL CALC-SCNC: 13 MMOL/L — SIGNIFICANT CHANGE UP (ref 5–17)
BUN SERPL-MCNC: 35 MG/DL — HIGH (ref 7–23)
CALCIUM SERPL-MCNC: 8.9 MG/DL — SIGNIFICANT CHANGE UP (ref 8.4–10.5)
CHLORIDE SERPL-SCNC: 102 MMOL/L — SIGNIFICANT CHANGE UP (ref 96–108)
CO2 SERPL-SCNC: 24 MMOL/L — SIGNIFICANT CHANGE UP (ref 22–31)
CREAT SERPL-MCNC: 1.74 MG/DL — HIGH (ref 0.5–1.3)
GLUCOSE SERPL-MCNC: 102 MG/DL — HIGH (ref 70–99)
HCT VFR BLD CALC: 31.1 % — LOW (ref 39–50)
HGB BLD-MCNC: 9.8 G/DL — LOW (ref 13–17)
INR BLD: 2.57 RATIO — HIGH (ref 0.88–1.16)
MCHC RBC-ENTMCNC: 28.2 PG — SIGNIFICANT CHANGE UP (ref 27–34)
MCHC RBC-ENTMCNC: 31.5 GM/DL — LOW (ref 32–36)
MCV RBC AUTO: 89.4 FL — SIGNIFICANT CHANGE UP (ref 80–100)
PLATELET # BLD AUTO: 182 K/UL — SIGNIFICANT CHANGE UP (ref 150–400)
POTASSIUM SERPL-MCNC: 3.8 MMOL/L — SIGNIFICANT CHANGE UP (ref 3.5–5.3)
POTASSIUM SERPL-SCNC: 3.8 MMOL/L — SIGNIFICANT CHANGE UP (ref 3.5–5.3)
PROTHROM AB SERPL-ACNC: 30.4 SEC — HIGH (ref 10–13.1)
RBC # BLD: 3.48 M/UL — LOW (ref 4.2–5.8)
RBC # FLD: 15.7 % — HIGH (ref 10.3–14.5)
SODIUM SERPL-SCNC: 139 MMOL/L — SIGNIFICANT CHANGE UP (ref 135–145)
WBC # BLD: 5.63 K/UL — SIGNIFICANT CHANGE UP (ref 3.8–10.5)
WBC # FLD AUTO: 5.63 K/UL — SIGNIFICANT CHANGE UP (ref 3.8–10.5)

## 2019-11-17 PROCEDURE — 99223 1ST HOSP IP/OBS HIGH 75: CPT | Mod: GC

## 2019-11-17 RX ORDER — WARFARIN SODIUM 2.5 MG/1
7.5 TABLET ORAL ONCE
Refills: 0 | Status: COMPLETED | OUTPATIENT
Start: 2019-11-17 | End: 2019-11-17

## 2019-11-17 RX ADMIN — Medication 80 MILLIGRAM(S): at 17:23

## 2019-11-17 RX ADMIN — CARVEDILOL PHOSPHATE 6.25 MILLIGRAM(S): 80 CAPSULE, EXTENDED RELEASE ORAL at 17:22

## 2019-11-17 RX ADMIN — CARVEDILOL PHOSPHATE 6.25 MILLIGRAM(S): 80 CAPSULE, EXTENDED RELEASE ORAL at 05:16

## 2019-11-17 RX ADMIN — WARFARIN SODIUM 7.5 MILLIGRAM(S): 2.5 TABLET ORAL at 21:20

## 2019-11-17 RX ADMIN — Medication 80 MILLIGRAM(S): at 05:16

## 2019-11-17 NOTE — CONSULT NOTE ADULT - ASSESSMENT
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    #ADHF  -Agree w/ aggressive IV diuresis w/ lasix 80 BID. Monitor strict I's/O's and daily weight, will titrate diuresis accordingly  -c/w coreg 6.25mg BID. Cr elevation, will hold off on starting ACEi/ARB  -Can trial pt on hydralazine 10 q8h for further afterload reduction  -Given elderly age and comorbidities, unlikely to benefit from upgrading to AICD    #Lead thrombus  -c/w coumadin    Faustino Lopez PGY4  [phone]  All Cardiology service information can be found 24/7 on [email or web address], password: [email or web address]

## 2019-11-17 NOTE — PROGRESS NOTE ADULT - PROBLEM SELECTOR PLAN 1
- presenting with b/l LE edema, weight gain  - Lasix 80mg IVP bid   - strict I/O monitoring  - daily weights  - Cardiology consult appreciated.

## 2019-11-17 NOTE — CONSULT NOTE ADULT - SUBJECTIVE AND OBJECTIVE BOX
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 Pt with recent admission for ADHF treated w/ IV diuresis and transitioned to oral bumex. He denies any chest pain, sob, or palpitations. Pt unable to offer much history at time of eval.     PMHx:   Prostate cancer  CHF (congestive heart failure)  BPH (benign prostatic hyperplasia)  Hypertension      PSHx:   History of pacemaker      Allergies:  No Known Allergies      Home Meds: Reviewed    Current Medications:   carvedilol 6.25 milliGRAM(s) Oral every 12 hours  furosemide   Injectable 80 milliGRAM(s) IV Push two times a day  influenza   Vaccine 0.5 milliLiter(s) IntraMuscular once  melatonin 3 milliGRAM(s) Oral at bedtime  QUEtiapine 12.5 milliGRAM(s) Oral every 12 hours PRN  warfarin 7.5 milliGRAM(s) Oral once      FAMILY HISTORY:  No pertinent family history in first degree relatives      Social History:  Denies smoking, alcohol, or IVDU     REVIEW OF SYSTEMS:  CONSTITUTIONAL: No fevers or chills  EYES/ENT: No visual changes;  No dysphagia  NECK: No pain or stiffness  RESPIRATORY: +sob   CARDIOVASCULAR: No chest pain or palpitations; + b/l LE edema   GASTROINTESTINAL: No abdominal or epigastric pain. No nausea, vomiting, or hematemesis; No diarrhea or constipation. No melena or hematochezia.  BACK: No back pain  GENITOURINARY: No dysuria, frequency or hematuria  NEUROLOGICAL: No numbness or weakness  SKIN: No itching, burning, rashes, or lesions   All other review of systems is negative unless indicated above.    Physical Exam:  T(F): 97.6 (11-17), Max: 98.1 (11-16)  HR: 71 (11-17) (64 - 71)  BP: 135/70 (11-17) (116/70 - 135/70)  RR: 18 (11-17)  SpO2: 100% (11-17)  GENERAL: No acute distress  HEAD:  Atraumatic, Normocephalic  ENT: EOMI, PERRLA, conjunctiva and sclera clear  CHEST/LUNG: bibasilar crackles   BACK: No spinal tenderness  HEART: Regular rate and rhythm; No murmurs, rubs, or gallops  ABDOMEN: Soft, Nontender, Nondistended; Bowel sounds present  EXTREMITIES:  2+ b/l LE edema   PSYCH: Nl behavior, nl affect  NEUROLOGY: non-focal, cranial nerves intact  SKIN: Normal color, No rashes or lesions  LINES:    Cardiovascular Diagnostic Testing:    ECG: Personally reviewed: AV-paced    Echo: Personally reviewed:  < from: Transthoracic Echocardiogram (09.26.19 @ 09:16) >  Conclusions:  1. Mitral annular calcification. Tethered and thickened  mitral leaflets with normal diastolic opening. Mild mitral  regurgitation.  2. Calcified trileaflet aortic valve with normal opening.  No aortic valve regurgitation seen.  3. Moderate to severe segmental left ventricular systolic  dysfunction. The mid to distal septum, basal inferior,  distal inferior, distal segments, and the apex are  akinetic.  4. Moderate diastolic dysfunction(Stage II).  5. Mild riight ventricular enlargement with normal right  ventricular systolic function. A device wire is noted in  the right heart. There is thickening of the device wire  within the RV with mobile elements. Clinical correlation  advised.  *** Compared with echocardiogram of 8/19/2019, results are  similar on today's study. Thickening on the device wire  witin the RV with mobile elements was also seen on the  prior study.    < end of copied text >      CXR: b/l pleff    Labs: Personally reviewed                        9.8    5.63  )-----------( 182      ( 17 Nov 2019 09:36 )             31.1     11-17    139  |  102  |  35<H>  ----------------------------<  102<H>  3.8   |  24  |  1.74<H>    Ca    8.9      17 Nov 2019 07:29      PT/INR - ( 17 Nov 2019 08:57 )   PT: 30.4 sec;   INR: 2.57 ratio         PTT - ( 16 Nov 2019 09:40 )  PTT:36.7 sec    CARDIAC MARKERS ( 15 Nov 2019 17:15 )  57 ng/L / x     / x     / x     / x     / x      CARDIAC MARKERS ( 15 Nov 2019 15:38 )  67 ng/L / x     / x     / x     / x     / x            Serum Pro-Brain Natriuretic Peptide: [id number] pg/mL (11-15 @ 15:38) All Cardiology service information can be found 24/7 on [email or web address], password: [email or web address]    Patient seen and evaluated at bedside    Chief Complaint: LE swelling, weight gain    HPI:  89 M w/ prostate CA, 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 Pt with recent admission for ADHF treated w/ IV diuresis and transitioned to oral bumex. He denies any chest pain, sob, or palpitations. Pt unable to offer much history at time of eval.     ===========================  Interval Events  - Reports improved breahthing  -   - No reported worsening CP/Palps/SOB\  ===========================      PMHx:   Prostate cancer  CHF (congestive heart failure)  BPH (benign prostatic hyperplasia)  Hypertension    PFHx  Father HTN    PSHx:   History of pacemaker      Allergies:  No Known Allergies      Home Meds: Reviewed    Current Medications:   carvedilol 6.25 milliGRAM(s) Oral every 12 hours  furosemide   Injectable 80 milliGRAM(s) IV Push two times a day  influenza   Vaccine 0.5 milliLiter(s) IntraMuscular once  melatonin 3 milliGRAM(s) Oral at bedtime  QUEtiapine 12.5 milliGRAM(s) Oral every 12 hours PRN  warfarin 7.5 milliGRAM(s) Oral once          Social History:  Denies smoking, alcohol, or IVDU     REVIEW OF SYSTEMS:  CONSTITUTIONAL: No fevers or chills  EYES/ENT: No visual changes;  No dysphagia  NECK: No pain or stiffness  RESPIRATORY: +sob   CARDIOVASCULAR: No chest pain or palpitations; + b/l LE edema   GASTROINTESTINAL: No abdominal or epigastric pain. No nausea, vomiting, or hematemesis; No diarrhea or constipation. No melena or hematochezia.  BACK: No back pain  GENITOURINARY: No dysuria, frequency or hematuria  NEUROLOGICAL: No numbness or weakness  SKIN: No itching, burning, rashes, or lesions   All other review of systems is negative unless indicated above.    Physical Exam:  T(F): 97.6 (11-17), Max: 98.1 (11-16)  HR: 71 (11-17) (64 - 71)  BP: 135/70 (11-17) (116/70 - 135/70)  RR: 18 (11-17)  SpO2: 100% (11-17)  GENERAL: No acute distress  HEAD:  Atraumatic, Normocephalic  ENT: EOMI, PERRLA, conjunctiva and sclera clear  CHEST/LUNG: bibasilar crackles   BACK: No spinal tenderness  HEART: Regular rate and rhythm; No murmurs, rubs, or gallops  ABDOMEN: Soft, Nontender, Nondistended; Bowel sounds present  EXTREMITIES:  2+ b/l LE edema   PSYCH: Nl behavior, nl affect  NEUROLOGY: non-focal, cranial nerves intact  SKIN: Normal color, No rashes or lesions  LINES:    Cardiovascular Diagnostic Testing:    ECG: Personally reviewed: AV-paced    Echo: Personally reviewed:  < from: Transthoracic Echocardiogram (09.26.19 @ 09:16) >  Conclusions:  1. Mitral annular calcification. Tethered and thickened  mitral leaflets with normal diastolic opening. Mild mitral  regurgitation.  2. Calcified trileaflet aortic valve with normal opening.  No aortic valve regurgitation seen.  3. Moderate to severe segmental left ventricular systolic  dysfunction. The mid to distal septum, basal inferior,  distal inferior, distal segments, and the apex are  akinetic.  4. Moderate diastolic dysfunction(Stage II).  5. Mild riight ventricular enlargement with normal right  ventricular systolic function. A device wire is noted in  the right heart. There is thickening of the device wire  within the RV with mobile elements. Clinical correlation  advised.  *** Compared with echocardiogram of 8/19/2019, results are  similar on today's study. Thickening on the device wire  witin the RV with mobile elements was also seen on the  prior study.    < end of copied text >      CXR: b/l pleff    Labs: Personally reviewed 11//17/19                        9.8    5.63  )-----------( 182      ( 17 Nov 2019 09:36 )             31.1     11-17    139  |  102  |  35<H>  ----------------------------<  102<H>  3.8   |  24  |  1.74<H>    Ca    8.9      17 Nov 2019 07:29      PT/INR - ( 17 Nov 2019 08:57 )   PT: 30.4 sec;   INR: 2.57 ratio         PTT - ( 16 Nov 2019 09:40 )  PTT:36.7 sec    CARDIAC MARKERS ( 15 Nov 2019 17:15 )  57 ng/L / x     / x     / x     / x     / x      CARDIAC MARKERS ( 15 Nov 2019 15:38 )  67 ng/L / x     / x     / x     / x     / x            Serum Pro-Brain Natriuretic Peptide: [id number] pg/mL (11-15 @ 15:38)

## 2019-11-18 DIAGNOSIS — Z71.89 OTHER SPECIFIED COUNSELING: ICD-10-CM

## 2019-11-18 LAB
ANION GAP SERPL CALC-SCNC: 14 MMOL/L — SIGNIFICANT CHANGE UP (ref 5–17)
BUN SERPL-MCNC: 36 MG/DL — HIGH (ref 7–23)
CALCIUM SERPL-MCNC: 8.8 MG/DL — SIGNIFICANT CHANGE UP (ref 8.4–10.5)
CHLORIDE SERPL-SCNC: 99 MMOL/L — SIGNIFICANT CHANGE UP (ref 96–108)
CO2 SERPL-SCNC: 25 MMOL/L — SIGNIFICANT CHANGE UP (ref 22–31)
CREAT SERPL-MCNC: 1.66 MG/DL — HIGH (ref 0.5–1.3)
GLUCOSE SERPL-MCNC: 108 MG/DL — HIGH (ref 70–99)
HCT VFR BLD CALC: 32.3 % — LOW (ref 39–50)
HGB BLD-MCNC: 10 G/DL — LOW (ref 13–17)
INR BLD: 2.61 RATIO — HIGH (ref 0.88–1.16)
MCHC RBC-ENTMCNC: 28.1 PG — SIGNIFICANT CHANGE UP (ref 27–34)
MCHC RBC-ENTMCNC: 31 GM/DL — LOW (ref 32–36)
MCV RBC AUTO: 90.7 FL — SIGNIFICANT CHANGE UP (ref 80–100)
PLATELET # BLD AUTO: 192 K/UL — SIGNIFICANT CHANGE UP (ref 150–400)
POTASSIUM SERPL-MCNC: 3.7 MMOL/L — SIGNIFICANT CHANGE UP (ref 3.5–5.3)
POTASSIUM SERPL-SCNC: 3.7 MMOL/L — SIGNIFICANT CHANGE UP (ref 3.5–5.3)
PROTHROM AB SERPL-ACNC: 30.6 SEC — HIGH (ref 10–13.1)
RBC # BLD: 3.56 M/UL — LOW (ref 4.2–5.8)
RBC # FLD: 15.4 % — HIGH (ref 10.3–14.5)
SODIUM SERPL-SCNC: 138 MMOL/L — SIGNIFICANT CHANGE UP (ref 135–145)
WBC # BLD: 5.77 K/UL — SIGNIFICANT CHANGE UP (ref 3.8–10.5)
WBC # FLD AUTO: 5.77 K/UL — SIGNIFICANT CHANGE UP (ref 3.8–10.5)

## 2019-11-18 PROCEDURE — 99233 SBSQ HOSP IP/OBS HIGH 50: CPT

## 2019-11-18 RX ORDER — WARFARIN SODIUM 2.5 MG/1
6 TABLET ORAL ONCE
Refills: 0 | Status: COMPLETED | OUTPATIENT
Start: 2019-11-18 | End: 2019-11-18

## 2019-11-18 RX ADMIN — CARVEDILOL PHOSPHATE 6.25 MILLIGRAM(S): 80 CAPSULE, EXTENDED RELEASE ORAL at 05:09

## 2019-11-18 RX ADMIN — Medication 80 MILLIGRAM(S): at 05:09

## 2019-11-18 RX ADMIN — Medication 80 MILLIGRAM(S): at 17:31

## 2019-11-18 RX ADMIN — WARFARIN SODIUM 6 MILLIGRAM(S): 2.5 TABLET ORAL at 21:40

## 2019-11-18 RX ADMIN — Medication 3 MILLIGRAM(S): at 21:40

## 2019-11-18 RX ADMIN — CARVEDILOL PHOSPHATE 6.25 MILLIGRAM(S): 80 CAPSULE, EXTENDED RELEASE ORAL at 17:31

## 2019-11-18 NOTE — PHYSICAL THERAPY INITIAL EVALUATION ADULT - GAIT DEVIATIONS NOTED, PT EVAL
increased time in double stance/decreased weight-shifting ability/decreased diallo/decreased step length/decreased stride length

## 2019-11-18 NOTE — PHYSICAL THERAPY INITIAL EVALUATION ADULT - REHAB POTENTIAL, PT EVAL
good, to achieve stated therapy goals good, to achieve stated therapy goals/while in hospital will place on AMB AIDE PROGRAM pt agreeable

## 2019-11-18 NOTE — PHYSICAL THERAPY INITIAL EVALUATION ADULT - IMPAIRED TRANSFERS: SIT/STAND, REHAB EVAL
impaired postural control/decreased strength/impaired balance/decrerased endurance , intermittent min unsteady , perform x 3/cognition

## 2019-11-18 NOTE — PHYSICAL THERAPY INITIAL EVALUATION ADULT - IMPAIRMENTS CONTRIBUTING TO GAIT DEVIATIONS, PT EVAL
impaired balance/impaired postural control/cognition/decreased strength/decreased endurance , intermittent vc to stand upright intermittent min unsteady

## 2019-11-18 NOTE — PHYSICAL THERAPY INITIAL EVALUATION ADULT - GENERAL OBSERVATIONS, REHAB EVAL
pt received in bed earlier eating lunch eating spaghetti with fingers , gown w/ sauce and spaghetti ; pt need assist with cut food PT assist and shown with spoon can eat ;pt able to do and wish to continue eating and defer gown change now , top rails up and 1 siderail hob 40 + bed alarm active ; PT return when pt finished eating assisted pt with remove blanket/sheets soiled with food and change gown of pt ; pt appreciate the assist ; pt agreeable for PT at this time ;

## 2019-11-18 NOTE — PHYSICAL THERAPY INITIAL EVALUATION ADULT - PERTINENT HX OF CURRENT PROBLEM, REHAB EVAL
11/15/19 HCT ; mod chronic microvascular ischemic dz ,no acute event ; US TTE : mod global hypokinesis LV contractility /RV enlarged/IVC plethoric present ; CXR small B pleural effusions

## 2019-11-18 NOTE — PROGRESS NOTE ADULT - PROBLEM SELECTOR PLAN 1
- presenting with b/l LE edema, weight gain  - Lasix 80mg IVP bid   - strict I/O monitoring  - daily weights  - Cardiology f/up.

## 2019-11-18 NOTE — PHYSICAL THERAPY INITIAL EVALUATION ADULT - DISCHARGE DISPOSITION, PT EVAL
home w/ assist/home w/ home PT/to increase fxl mobility , strength, balance, endurance , fall prevention education continued ; pt needs assist all functional activity and adl's ;

## 2019-11-18 NOTE — PHYSICAL THERAPY INITIAL EVALUATION ADULT - ADDITIONAL COMMENTS
pt lives with wife and sons ; independent in personal care and need assist sometimes with amb with rolling walker; son Alexandre report 20 lb weight gain for pt in few days time pt lives with wife and sons in house denies steps to enter or inside  ; independent in personal care and need assist sometimes with amb with rolling walker; son Alexandre report 20 lb weight gain for pt in few days time; pt owns a rolling walker , was a  in [place] per pt ; pt decline ID caregiver; pt uninsured and undocumented has medicaid for hospital only per CM

## 2019-11-18 NOTE — PHYSICAL THERAPY INITIAL EVALUATION ADULT - GAIT TRAINING, PT EVAL
GOAL: pt will amb with rolling walker with close supervision to supervision for 150 ft x 2 in 2 weeks

## 2019-11-19 LAB
ANION GAP SERPL CALC-SCNC: 10 MMOL/L — SIGNIFICANT CHANGE UP (ref 5–17)
BUN SERPL-MCNC: 30 MG/DL — HIGH (ref 7–23)
CALCIUM SERPL-MCNC: 8.6 MG/DL — SIGNIFICANT CHANGE UP (ref 8.4–10.5)
CHLORIDE SERPL-SCNC: 99 MMOL/L — SIGNIFICANT CHANGE UP (ref 96–108)
CO2 SERPL-SCNC: 27 MMOL/L — SIGNIFICANT CHANGE UP (ref 22–31)
CREAT SERPL-MCNC: 1.45 MG/DL — HIGH (ref 0.5–1.3)
GLUCOSE SERPL-MCNC: 106 MG/DL — HIGH (ref 70–99)
INR BLD: 2.46 RATIO — HIGH (ref 0.88–1.16)
NT-PROBNP SERPL-SCNC: HIGH PG/ML (ref 0–300)
POTASSIUM SERPL-MCNC: 3.5 MMOL/L — SIGNIFICANT CHANGE UP (ref 3.5–5.3)
POTASSIUM SERPL-SCNC: 3.5 MMOL/L — SIGNIFICANT CHANGE UP (ref 3.5–5.3)
PROTHROM AB SERPL-ACNC: 29.1 SEC — HIGH (ref 10–13.1)
SODIUM SERPL-SCNC: 136 MMOL/L — SIGNIFICANT CHANGE UP (ref 135–145)

## 2019-11-19 PROCEDURE — 99233 SBSQ HOSP IP/OBS HIGH 50: CPT

## 2019-11-19 RX ORDER — WARFARIN SODIUM 2.5 MG/1
6 TABLET ORAL ONCE
Refills: 0 | Status: COMPLETED | OUTPATIENT
Start: 2019-11-19 | End: 2019-11-19

## 2019-11-19 RX ORDER — HYDRALAZINE HCL 50 MG
10 TABLET ORAL EVERY 12 HOURS
Refills: 0 | Status: DISCONTINUED | OUTPATIENT
Start: 2019-11-19 | End: 2019-11-21

## 2019-11-19 RX ADMIN — CARVEDILOL PHOSPHATE 6.25 MILLIGRAM(S): 80 CAPSULE, EXTENDED RELEASE ORAL at 17:47

## 2019-11-19 RX ADMIN — CARVEDILOL PHOSPHATE 6.25 MILLIGRAM(S): 80 CAPSULE, EXTENDED RELEASE ORAL at 05:29

## 2019-11-19 RX ADMIN — Medication 3 MILLIGRAM(S): at 21:49

## 2019-11-19 RX ADMIN — Medication 10 MILLIGRAM(S): at 17:47

## 2019-11-19 RX ADMIN — Medication 80 MILLIGRAM(S): at 17:47

## 2019-11-19 RX ADMIN — Medication 80 MILLIGRAM(S): at 05:29

## 2019-11-19 RX ADMIN — WARFARIN SODIUM 6 MILLIGRAM(S): 2.5 TABLET ORAL at 21:49

## 2019-11-19 NOTE — PROGRESS NOTE ADULT - PROBLEM SELECTOR PLAN 2
- Seroquel 12.5mg po q 12 prn for agitation  -  CT head: No acute CVA  - fall precautions  - Resolving

## 2019-11-20 LAB
ANION GAP SERPL CALC-SCNC: 11 MMOL/L — SIGNIFICANT CHANGE UP (ref 5–17)
BUN SERPL-MCNC: 29 MG/DL — HIGH (ref 7–23)
CALCIUM SERPL-MCNC: 8.8 MG/DL — SIGNIFICANT CHANGE UP (ref 8.4–10.5)
CHLORIDE SERPL-SCNC: 96 MMOL/L — SIGNIFICANT CHANGE UP (ref 96–108)
CO2 SERPL-SCNC: 28 MMOL/L — SIGNIFICANT CHANGE UP (ref 22–31)
CREAT SERPL-MCNC: 1.38 MG/DL — HIGH (ref 0.5–1.3)
GLUCOSE BLDC GLUCOMTR-MCNC: 116 MG/DL — HIGH (ref 70–99)
GLUCOSE SERPL-MCNC: 110 MG/DL — HIGH (ref 70–99)
HCT VFR BLD CALC: 32.5 % — LOW (ref 39–50)
HGB BLD-MCNC: 10.2 G/DL — LOW (ref 13–17)
INR BLD: 2.5 RATIO — HIGH (ref 0.88–1.16)
MCHC RBC-ENTMCNC: 28.7 PG — SIGNIFICANT CHANGE UP (ref 27–34)
MCHC RBC-ENTMCNC: 31.4 GM/DL — LOW (ref 32–36)
MCV RBC AUTO: 91.5 FL — SIGNIFICANT CHANGE UP (ref 80–100)
PLATELET # BLD AUTO: 206 K/UL — SIGNIFICANT CHANGE UP (ref 150–400)
POTASSIUM SERPL-MCNC: 3.6 MMOL/L — SIGNIFICANT CHANGE UP (ref 3.5–5.3)
POTASSIUM SERPL-SCNC: 3.6 MMOL/L — SIGNIFICANT CHANGE UP (ref 3.5–5.3)
PROTHROM AB SERPL-ACNC: 29.3 SEC — HIGH (ref 10–13.1)
RBC # BLD: 3.55 M/UL — LOW (ref 4.2–5.8)
RBC # FLD: 15.5 % — HIGH (ref 10.3–14.5)
SODIUM SERPL-SCNC: 135 MMOL/L — SIGNIFICANT CHANGE UP (ref 135–145)
WBC # BLD: 6.18 K/UL — SIGNIFICANT CHANGE UP (ref 3.8–10.5)
WBC # FLD AUTO: 6.18 K/UL — SIGNIFICANT CHANGE UP (ref 3.8–10.5)

## 2019-11-20 PROCEDURE — 99233 SBSQ HOSP IP/OBS HIGH 50: CPT

## 2019-11-20 RX ORDER — WARFARIN SODIUM 2.5 MG/1
6 TABLET ORAL ONCE
Refills: 0 | Status: COMPLETED | OUTPATIENT
Start: 2019-11-20 | End: 2019-11-20

## 2019-11-20 RX ADMIN — Medication 80 MILLIGRAM(S): at 05:11

## 2019-11-20 RX ADMIN — WARFARIN SODIUM 6 MILLIGRAM(S): 2.5 TABLET ORAL at 22:07

## 2019-11-20 RX ADMIN — CARVEDILOL PHOSPHATE 6.25 MILLIGRAM(S): 80 CAPSULE, EXTENDED RELEASE ORAL at 05:10

## 2019-11-20 RX ADMIN — Medication 3 MILLIGRAM(S): at 22:07

## 2019-11-20 RX ADMIN — Medication 10 MILLIGRAM(S): at 05:11

## 2019-11-20 RX ADMIN — Medication 10 MILLIGRAM(S): at 18:31

## 2019-11-20 RX ADMIN — Medication 80 MILLIGRAM(S): at 18:31

## 2019-11-20 NOTE — DISCHARGE NOTE PROVIDER - NSDCMRMEDTOKEN_GEN_ALL_CORE_FT
bumetanide 2 mg oral tablet: 1 tab(s) orally once a day at 8 AM  carvedilol 6.25 mg oral tablet: 1 tab(s) orally 2 times a day  warfarin 1 mg oral tablet: 7 tab(s) orally once a day (at bedtime)   Check INR level in 3 days with your primary care provider bumetanide 2 mg oral tablet: 1 tab(s) orally once a day at 8 AM  carvedilol 6.25 mg oral tablet: 1 tab(s) orally 2 times a day  melatonin 3 mg oral tablet: 1 tab(s) orally once a day (at bedtime)  warfarin 1 mg oral tablet: 7 tab(s) orally once a day (at bedtime)   Check INR level in 3 days with your primary care provider bumetanide 1 mg oral tablet: 3 tab(s) orally once a day   carvedilol 6.25 mg oral tablet: 1 tab(s) orally 2 times a day  melatonin 3 mg oral tablet: 1 tab(s) orally once a day (at bedtime)  warfarin 1 mg oral tablet: 7 tab(s) orally once a day (at bedtime)   Check INR level in 3 days with your primary care provider bumetanide 1 mg oral tablet: 3 tab(s) orally once a day   carvedilol 6.25 mg oral tablet: 1 tab(s) orally 2 times a day  warfarin 1 mg oral tablet: 7 tab(s) orally once a day (at bedtime)   Check INR level in 3 days with your primary care provider

## 2019-11-20 NOTE — DISCHARGE NOTE PROVIDER - HOSPITAL COURSE
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 He is admitted for CHF exacerbation. Also found to have AMS.     Acute on chronic systolic congestive heart failure.  seen by cardiology Lasix 80mg IVP bid 89M with a history of Prostate CA, PPM with lead thrombus on Coumadin, HTN, HFrEF (EF 35% 9/2019)  presenting with increased LE swelling X 3 days. He is admitted for CHF exacerbation. Also found to have AMS. mental status back to baseline.     Acute on chronic systolic congestive heart failure.  seen by cardiology Lasix 80mg IVP bid and adjusted medication 89M with a history of Prostate CA, PPM with lead thrombus on Coumadin, HTN, HFrEF (EF 35% 9/2019)  presenting with increased LE swelling X 3 days. He is admitted for CHF exacerbation. Also found to have AMS. mental status back to baseline.     Acute on chronic systolic congestive heart failure.  seen by cardiology Lasix 80mg IVP bid and adjusted medication and improved his symptoms 89M with a history of Prostate CA, PPM with lead thrombus on Coumadin, HTN, HFrEF (EF 35% 9/2019)  presenting with increased LE swelling X 3 days. He is admitted for CHF exacerbation. Also found to have AMS. mental status back to baseline.     Acute on chronic systolic congestive heart failure.  seen by cardiology Lasix 80mg IVP bid and adjusted medication and improved his symptoms;    seen by PT-home care 89M with a history of Prostate CA, PPM with lead thrombus on Coumadin, HTN, HFrEF (EF 35% 9/2019)  presenting with increased LE swelling X 3 days. He is admitted for CHF exacerbation. Also found to have AMS. mental status back to baseline.     Acute on chronic systolic congestive heart failure.  seen by cardiology Lasix 80mg IVP bid and adjusted medication and improved his symptoms; on discharge continue with bumetanide 3 mg daily and coreg 6.25 mg twice daily per cardiology; follow up with PMD on 11/27/19 .    seen by PT-home care.    cleared by MD for discharge home with home care.

## 2019-11-20 NOTE — DISCHARGE NOTE PROVIDER - PROVIDER TOKENS
PROVIDER:[TOKEN:[[id number]]],PROVIDER:[TOKEN:[[id number]]] PROVIDER:[TOKEN:[[id number]]],PROVIDER:[TOKEN:[[id number]],SCHEDULEDAPPT:[11/27/2019]]

## 2019-11-20 NOTE — DISCHARGE NOTE PROVIDER - CARE PROVIDER_API CALL
Lewis Woods (MD)  Cardiovascular Disease; Internal Medicine  [address], [address] [place] [address]  Phone: [phone]  Fax: [phone]  Follow Up Time:     Ike Fisher)  [address], [facility]  [address]  [place] [address]  Phone: [phone]  Fax: [phone]  Follow Up Time: Lewis Woods (MD)  Cardiovascular Disease; Internal Medicine  [address], [address] [place] [address]  Phone: [phone]  Fax: [phone]  Follow Up Time:     Ike Fishre)  Medicine  [address] [place] [address], [facility]  [address]  [place] [address]  Phone: [phone]  Fax: [phone]  Scheduled Appointment: 11/27/2019

## 2019-11-20 NOTE — DISCHARGE NOTE PROVIDER - NSDCCPCAREPLAN_GEN_ALL_CORE_FT
PRINCIPAL DISCHARGE DIAGNOSIS  Diagnosis: Fluid overload, unspecified  Assessment and Plan of Treatment:       SECONDARY DISCHARGE DIAGNOSES  Diagnosis: Abdominal distention  Assessment and Plan of Treatment:     Diagnosis: Lower extremity edema  Assessment and Plan of Treatment: PRINCIPAL DISCHARGE DIAGNOSIS  Diagnosis: Fluid overload, unspecified  Assessment and Plan of Treatment: acute on chronic diastolic heart failure  continue medication a sprescribed  low sodium low cholesterol 1500ml of fluid restriction per day  monitor weight daily and follow up with cardiologist in 1 week      SECONDARY DISCHARGE DIAGNOSES  Diagnosis: Disorder of cardiac pacemaker system, sequela  Assessment and Plan of Treatment: Disorder of cardiac pacemaker system, sequela  continue coumadin as prescribed  monitor for any bleeding  follow up blood work to adjust dose    Diagnosis: CKD (chronic kidney disease), stage III  Assessment and Plan of Treatment: CKD (chronic kidney disease), stage III  continue medication as prescribed  avoid kidney toxic medication  follow up blood work in 1 week    Diagnosis: Insomnia, unspecified type  Assessment and Plan of Treatment: Insomnia, unspecified type  continue melatonin    Diagnosis: Abdominal distention  Assessment and Plan of Treatment: resolved    Diagnosis: Lower extremity edema  Assessment and Plan of Treatment: resolved  continue diuretics    Diagnosis: Delirium  Assessment and Plan of Treatment: Delirium  resolved  continue medication as prescribed PRINCIPAL DISCHARGE DIAGNOSIS  Diagnosis: Fluid overload, unspecified  Assessment and Plan of Treatment: acute on chronic diastolic heart failure  continue medication as prescribed  low sodium low cholesterol 1500ml of fluid restriction per day  monitor weight daily and follow up with cardiologist in 1 week      SECONDARY DISCHARGE DIAGNOSES  Diagnosis: Disorder of cardiac pacemaker system, sequela  Assessment and Plan of Treatment: Disorder of cardiac pacemaker system, sequela  continue coumadin as prescribed  monitor for any bleeding  follow up blood work to adjust dose    Diagnosis: CKD (chronic kidney disease), stage III  Assessment and Plan of Treatment: CKD (chronic kidney disease), stage III  continue medication as prescribed  avoid kidney toxic medication  follow up blood work in 1 week    Diagnosis: Insomnia, unspecified type  Assessment and Plan of Treatment: Insomnia, unspecified type  continue melatonin    Diagnosis: Abdominal distention  Assessment and Plan of Treatment: resolved    Diagnosis: Lower extremity edema  Assessment and Plan of Treatment: resolved  continue diuretics    Diagnosis: Delirium  Assessment and Plan of Treatment: Delirium  resolved  continue medication as prescribed PRINCIPAL DISCHARGE DIAGNOSIS  Diagnosis: Fluid overload, unspecified  Assessment and Plan of Treatment: acute on chronic diastolic heart failure  continue medication as prescribed  low sodium low cholesterol 1500ml of fluid restriction per day  monitor weight daily and follow up with cardiologist in 1 week      SECONDARY DISCHARGE DIAGNOSES  Diagnosis: Disorder of cardiac pacemaker system, sequela  Assessment and Plan of Treatment: Disorder of cardiac pacemaker system, sequela  continue coumadin as prescribed  monitor for any bleeding  follow up blood work to adjust dose    Diagnosis: CKD (chronic kidney disease), stage III  Assessment and Plan of Treatment: CKD (chronic kidney disease), stage III  continue medication as prescribed  avoid kidney toxic medication  follow up blood work in 1 week    Diagnosis: Insomnia, unspecified type  Assessment and Plan of Treatment: Insomnia, unspecified type  continue melatonin    Diagnosis: Abdominal distention  Assessment and Plan of Treatment: resolved    Diagnosis: Lower extremity edema  Assessment and Plan of Treatment: resolved  continue medication as prescribed    Diagnosis: Delirium  Assessment and Plan of Treatment: Delirium  resolved

## 2019-11-21 LAB
INR BLD: 2.29 RATIO — HIGH (ref 0.88–1.16)
NT-PROBNP SERPL-SCNC: HIGH PG/ML (ref 0–300)
PROTHROM AB SERPL-ACNC: 26.5 SEC — HIGH (ref 10–13.1)

## 2019-11-21 PROCEDURE — 99233 SBSQ HOSP IP/OBS HIGH 50: CPT

## 2019-11-21 RX ORDER — HYDRALAZINE HCL 50 MG
10 TABLET ORAL EVERY 8 HOURS
Refills: 0 | Status: DISCONTINUED | OUTPATIENT
Start: 2019-11-21 | End: 2019-11-22

## 2019-11-21 RX ORDER — WARFARIN SODIUM 2.5 MG/1
7.5 TABLET ORAL ONCE
Refills: 0 | Status: COMPLETED | OUTPATIENT
Start: 2019-11-21 | End: 2019-11-21

## 2019-11-21 RX ADMIN — CARVEDILOL PHOSPHATE 6.25 MILLIGRAM(S): 80 CAPSULE, EXTENDED RELEASE ORAL at 17:57

## 2019-11-21 RX ADMIN — Medication 80 MILLIGRAM(S): at 18:31

## 2019-11-21 RX ADMIN — Medication 10 MILLIGRAM(S): at 06:27

## 2019-11-21 RX ADMIN — CARVEDILOL PHOSPHATE 6.25 MILLIGRAM(S): 80 CAPSULE, EXTENDED RELEASE ORAL at 06:27

## 2019-11-21 RX ADMIN — Medication 80 MILLIGRAM(S): at 06:27

## 2019-11-21 RX ADMIN — Medication 3 MILLIGRAM(S): at 22:16

## 2019-11-21 RX ADMIN — Medication 10 MILLIGRAM(S): at 13:28

## 2019-11-21 RX ADMIN — Medication 10 MILLIGRAM(S): at 22:16

## 2019-11-21 RX ADMIN — WARFARIN SODIUM 7.5 MILLIGRAM(S): 2.5 TABLET ORAL at 22:15

## 2019-11-21 NOTE — PROGRESS NOTE ADULT - PROBLEM SELECTOR PLAN 1
- Lasix 80mg IVP bid   Zaroxylyn added by cardiology  - strict I/O monitoring  - daily weights  - Cardiology f/up.

## 2019-11-22 VITALS
DIASTOLIC BLOOD PRESSURE: 65 MMHG | SYSTOLIC BLOOD PRESSURE: 126 MMHG | HEART RATE: 68 BPM | OXYGEN SATURATION: 98 % | TEMPERATURE: 98 F | RESPIRATION RATE: 20 BRPM

## 2019-11-22 LAB
ALBUMIN SERPL ELPH-MCNC: 3.6 G/DL — SIGNIFICANT CHANGE UP (ref 3.3–5)
ALP SERPL-CCNC: 60 U/L — SIGNIFICANT CHANGE UP (ref 40–120)
ALT FLD-CCNC: 6 U/L — LOW (ref 10–45)
ANION GAP SERPL CALC-SCNC: 16 MMOL/L — SIGNIFICANT CHANGE UP (ref 5–17)
AST SERPL-CCNC: 17 U/L — SIGNIFICANT CHANGE UP (ref 10–40)
BILIRUB SERPL-MCNC: 0.4 MG/DL — SIGNIFICANT CHANGE UP (ref 0.2–1.2)
BUN SERPL-MCNC: 37 MG/DL — HIGH (ref 7–23)
CALCIUM SERPL-MCNC: 9 MG/DL — SIGNIFICANT CHANGE UP (ref 8.4–10.5)
CHLORIDE SERPL-SCNC: 92 MMOL/L — LOW (ref 96–108)
CO2 SERPL-SCNC: 30 MMOL/L — SIGNIFICANT CHANGE UP (ref 22–31)
CREAT SERPL-MCNC: 1.44 MG/DL — HIGH (ref 0.5–1.3)
GLUCOSE SERPL-MCNC: 132 MG/DL — HIGH (ref 70–99)
INR BLD: 2.05 RATIO — HIGH (ref 0.88–1.16)
NT-PROBNP SERPL-SCNC: HIGH PG/ML (ref 0–300)
POTASSIUM SERPL-MCNC: 4.1 MMOL/L — SIGNIFICANT CHANGE UP (ref 3.5–5.3)
POTASSIUM SERPL-SCNC: 4.1 MMOL/L — SIGNIFICANT CHANGE UP (ref 3.5–5.3)
PROT SERPL-MCNC: 6.9 G/DL — SIGNIFICANT CHANGE UP (ref 6–8.3)
PROTHROM AB SERPL-ACNC: 23.7 SEC — HIGH (ref 10–13.1)
SODIUM SERPL-SCNC: 138 MMOL/L — SIGNIFICANT CHANGE UP (ref 135–145)

## 2019-11-22 PROCEDURE — 85610 PROTHROMBIN TIME: CPT

## 2019-11-22 PROCEDURE — 70450 CT HEAD/BRAIN W/O DYE: CPT

## 2019-11-22 PROCEDURE — 97116 GAIT TRAINING THERAPY: CPT

## 2019-11-22 PROCEDURE — 97162 PT EVAL MOD COMPLEX 30 MIN: CPT

## 2019-11-22 PROCEDURE — 85027 COMPLETE CBC AUTOMATED: CPT

## 2019-11-22 PROCEDURE — 96374 THER/PROPH/DIAG INJ IV PUSH: CPT

## 2019-11-22 PROCEDURE — 83880 ASSAY OF NATRIURETIC PEPTIDE: CPT

## 2019-11-22 PROCEDURE — 84484 ASSAY OF TROPONIN QUANT: CPT

## 2019-11-22 PROCEDURE — 99285 EMERGENCY DEPT VISIT HI MDM: CPT | Mod: 25

## 2019-11-22 PROCEDURE — 93005 ELECTROCARDIOGRAM TRACING: CPT

## 2019-11-22 PROCEDURE — 80048 BASIC METABOLIC PNL TOTAL CA: CPT

## 2019-11-22 PROCEDURE — 93308 TTE F-UP OR LMTD: CPT

## 2019-11-22 PROCEDURE — 82962 GLUCOSE BLOOD TEST: CPT

## 2019-11-22 PROCEDURE — 97110 THERAPEUTIC EXERCISES: CPT

## 2019-11-22 PROCEDURE — 85730 THROMBOPLASTIN TIME PARTIAL: CPT

## 2019-11-22 PROCEDURE — 71045 X-RAY EXAM CHEST 1 VIEW: CPT

## 2019-11-22 PROCEDURE — 99233 SBSQ HOSP IP/OBS HIGH 50: CPT

## 2019-11-22 PROCEDURE — 80053 COMPREHEN METABOLIC PANEL: CPT

## 2019-11-22 PROCEDURE — 83690 ASSAY OF LIPASE: CPT

## 2019-11-22 PROCEDURE — 83735 ASSAY OF MAGNESIUM: CPT

## 2019-11-22 RX ORDER — BUMETANIDE 0.25 MG/ML
3 INJECTION INTRAMUSCULAR; INTRAVENOUS
Qty: 90 | Refills: 0
Start: 2019-11-22 | End: 2019-12-21

## 2019-11-22 RX ORDER — WARFARIN SODIUM 2.5 MG/1
7.5 TABLET ORAL ONCE
Refills: 0 | Status: DISCONTINUED | OUTPATIENT
Start: 2019-11-22 | End: 2019-11-22

## 2019-11-22 RX ORDER — CARVEDILOL PHOSPHATE 80 MG/1
1 CAPSULE, EXTENDED RELEASE ORAL
Qty: 30 | Refills: 0
Start: 2019-11-22 | End: 2019-12-21

## 2019-11-22 RX ORDER — BUMETANIDE 0.25 MG/ML
3 INJECTION INTRAMUSCULAR; INTRAVENOUS DAILY
Refills: 0 | Status: DISCONTINUED | OUTPATIENT
Start: 2019-11-23 | End: 2019-11-22

## 2019-11-22 RX ORDER — LANOLIN ALCOHOL/MO/W.PET/CERES
1 CREAM (GRAM) TOPICAL
Qty: 0 | Refills: 0 | DISCHARGE
Start: 2019-11-22

## 2019-11-22 RX ORDER — LANOLIN ALCOHOL/MO/W.PET/CERES
1 CREAM (GRAM) TOPICAL
Qty: 30 | Refills: 0
Start: 2019-11-22 | End: 2019-12-21

## 2019-11-22 RX ORDER — WARFARIN SODIUM 2.5 MG/1
7 TABLET ORAL
Qty: 210 | Refills: 0
Start: 2019-11-22 | End: 2019-12-21

## 2019-11-22 RX ADMIN — Medication 10 MILLIGRAM(S): at 13:02

## 2019-11-22 RX ADMIN — CARVEDILOL PHOSPHATE 6.25 MILLIGRAM(S): 80 CAPSULE, EXTENDED RELEASE ORAL at 06:02

## 2019-11-22 RX ADMIN — Medication 80 MILLIGRAM(S): at 06:37

## 2019-11-22 RX ADMIN — CARVEDILOL PHOSPHATE 6.25 MILLIGRAM(S): 80 CAPSULE, EXTENDED RELEASE ORAL at 17:54

## 2019-11-22 RX ADMIN — Medication 10 MILLIGRAM(S): at 06:01

## 2019-11-22 NOTE — PROGRESS NOTE ADULT - REASON FOR ADMISSION
leg swelling

## 2019-11-22 NOTE — CHART NOTE - NSCHARTNOTESELECT_GEN_ALL_CORE
----- Message from Nazia Villegas sent at 10/3/2019  7:38 AM CDT -----  Contact: [phone]  Pt is asking for the results of his fit kit/please call to advise/thanks   Event Note

## 2019-11-22 NOTE — DISCHARGE NOTE NURSING/CASE MANAGEMENT/SOCIAL WORK - NSSCTYPOFSERV_GEN_ALL_CORE
Skilled Nurse, Social work evaluations for sliding scale cost for in home physical therapy. Start of care for 11/23/2019

## 2019-11-22 NOTE — PROGRESS NOTE ADULT - SUBJECTIVE AND OBJECTIVE BOX
Consult Follow Up Note:    Provider Specialty:  Cardiology.    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 Pt with recent admission for ADHF treated w/ IV diuresis and transitioned to oral bumex. He denies any chest pain, sob, or palpitations. Pt unable to offer much history at time of eval.     ===========================  Interval Events  - Reports improved breahthing  - Still with crackles and LE edema    ===========================      PMHx:   Prostate cancer  CHF (congestive heart failure)  BPH (benign prostatic hyperplasia)  Hypertension    PFHx  Father HTN    PSHx:   History of pacemaker      Allergies:  No Known Allergies      Home Meds: Reviewed    Current Medications:   carvedilol 6.25 milliGRAM(s) Oral every 12 hours  furosemide   Injectable 80 milliGRAM(s) IV Push two times a day  influenza   Vaccine 0.5 milliLiter(s) IntraMuscular once  melatonin 3 milliGRAM(s) Oral at bedtime  QUEtiapine 12.5 milliGRAM(s) Oral every 12 hours PRN  warfarin 7.5 milliGRAM(s) Oral once          Social History:  Denies smoking, alcohol, or IVDU     REVIEW OF SYSTEMS:  CONSTITUTIONAL: No fevers or chills  EYES/ENT: No visual changes;  No dysphagia  NECK: No pain or stiffness  RESPIRATORY: +sob   CARDIOVASCULAR: No chest pain or palpitations; + b/l LE edema   GASTROINTESTINAL: No abdominal or epigastric pain. No nausea, vomiting, or hematemesis; No diarrhea or constipation. No melena or hematochezia.  BACK: No back pain  GENITOURINARY: No dysuria, frequency or hematuria  NEUROLOGICAL: No numbness or weakness  SKIN: No itching, burning, rashes, or lesions   All other review of systems is negative unless indicated above.    Physical Exam:  T(F): 97.6 (11-17), Max: 98.1 (11-16)  HR: 71 (11-17) (64 - 71)  BP: 135/70 (11-17) (116/70 - 135/70)  RR: 18 (11-17)  SpO2: 100% (11-17)  GENERAL: No acute distress  HEAD:  Atraumatic, Normocephalic  ENT: EOMI, PERRLA, conjunctiva and sclera clear  CHEST/LUNG: bibasilar crackles   BACK: No spinal tenderness  HEART: Regular rate and rhythm; No murmurs, rubs, or gallops  ABDOMEN: Soft, Nontender, Nondistended; Bowel sounds present  EXTREMITIES:  2+ b/l LE edema   PSYCH: Nl behavior, nl affect  NEUROLOGY: non-focal, cranial nerves intact  SKIN: Normal color, No rashes or lesions  LINES:    Cardiovascular Diagnostic Testing:    ECG: Personally reviewed: AV-paced    Echo: Personally reviewed:  < from: Transthoracic Echocardiogram (09.26.19 @ 09:16) >  Conclusions:  1. Mitral annular calcification. Tethered and thickened  mitral leaflets with normal diastolic opening. Mild mitral  regurgitation.  2. Calcified trileaflet aortic valve with normal opening.  No aortic valve regurgitation seen.  3. Moderate to severe segmental left ventricular systolic  dysfunction. The mid to distal septum, basal inferior,  distal inferior, distal segments, and the apex are  akinetic.  4. Moderate diastolic dysfunction(Stage II).  5. Mild riight ventricular enlargement with normal right  ventricular systolic function. A device wire is noted in  the right heart. There is thickening of the device wire  within the RV with mobile elements. Clinical correlation  advised.  *** Compared with echocardiogram of 8/19/2019, results are  similar on today's study. Thickening on the device wire  witin the RV with mobile elements was also seen on the  prior study.    < end of copied text >      CXR: b/l pleff    Labs: Personally reviewed 11//18/19                        9.8    5.63  )-----------( 182      ( 17 Nov 2019 09:36 )             31.1     11-17    139  |  102  |  35<H>  ----------------------------<  102<H>  3.8   |  24  |  1.74<H>    Ca    8.9      17 Nov 2019 07:29      PT/INR - ( 17 Nov 2019 08:57 )   PT: 30.4 sec;   INR: 2.57 ratio         PTT - ( 16 Nov 2019 09:40 )  PTT:36.7 sec    CARDIAC MARKERS ( 15 Nov 2019 17:15 )  57 ng/L / x     / x     / x     / x     / x      CARDIAC MARKERS ( 15 Nov 2019 15:38 )  67 ng/L / x     / x     / x     / x     / x            Serum Pro-Brain Natriuretic Peptide: [id number] pg/mL (11-15 @ 15:38)              Assessment and Recommendation:   · Assessment		  89 M w/ prostate CA, 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    #ADHF  -Continue Lasix 80 IV BID - still diuresis to go   -c/w coreg 6.25mg BID.   -Would start pt on hydralazine 10 q8h for further afterload reduction  -Given elderly age and comorbidities, unlikely to benefit from upgrading to AICD    #Lead thrombus  -c/w coumadin    #Dispo  -Once discharged, needs heart failure follow up - he keeps getting re-admitted
Consult Follow Up Note:    Provider Specialty:  Cardiology.    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 Pt with recent admission for ADHF treated w/ IV diuresis and transitioned to oral bumex. He denies any chest pain, sob, or palpitations. Pt unable to offer much history at time of eval.     ===========================  Interval Events  - Still with crackles  - Reports improved breahthing      ===========================      PMHx:   Prostate cancer  CHF (congestive heart failure)  BPH (benign prostatic hyperplasia)  Hypertension    PFHx  Father HTN    PSHx:   History of pacemaker      Allergies:  No Known Allergies      Home Meds: Reviewed    Current Medications:   carvedilol 6.25 milliGRAM(s) Oral every 12 hours  furosemide   Injectable 80 milliGRAM(s) IV Push two times a day  influenza   Vaccine 0.5 milliLiter(s) IntraMuscular once  melatonin 3 milliGRAM(s) Oral at bedtime  QUEtiapine 12.5 milliGRAM(s) Oral every 12 hours PRN  warfarin 7.5 milliGRAM(s) Oral once          Social History:  Denies smoking, alcohol, or IVDU     REVIEW OF SYSTEMS:  CONSTITUTIONAL: No fevers or chills  EYES/ENT: No visual changes;  No dysphagia  NECK: No pain or stiffness  RESPIRATORY: +sob   CARDIOVASCULAR: No chest pain or palpitations; + b/l LE edema   GASTROINTESTINAL: No abdominal or epigastric pain. No nausea, vomiting, or hematemesis; No diarrhea or constipation. No melena or hematochezia.  BACK: No back pain  GENITOURINARY: No dysuria, frequency or hematuria  NEUROLOGICAL: No numbness or weakness  SKIN: No itching, burning, rashes, or lesions   All other review of systems is negative unless indicated above.    Physical Exam:  T(F): 97.6 (11-17), Max: 98.1 (11-16)  HR: 71 (11-17) (64 - 71)  BP: 135/70 (11-17) (116/70 - 135/70)  RR: 18 (11-17)  SpO2: 100% (11-17)  GENERAL: No acute distress  HEAD:  Atraumatic, Normocephalic  ENT: EOMI, PERRLA, conjunctiva and sclera clear  CHEST/LUNG: bibasilar crackles   BACK: No spinal tenderness  HEART: Regular rate and rhythm; No murmurs, rubs, or gallops  ABDOMEN: Soft, Nontender, Nondistended; Bowel sounds present  EXTREMITIES:  2+ b/l LE edema   PSYCH: Nl behavior, nl affect  NEUROLOGY: non-focal, cranial nerves intact  SKIN: Normal color, No rashes or lesions  LINES:    Cardiovascular Diagnostic Testing:    ECG: Personally reviewed: AV-paced    Echo: Personally reviewed:  < from: Transthoracic Echocardiogram (09.26.19 @ 09:16) >  Conclusions:  1. Mitral annular calcification. Tethered and thickened  mitral leaflets with normal diastolic opening. Mild mitral  regurgitation.  2. Calcified trileaflet aortic valve with normal opening.  No aortic valve regurgitation seen.  3. Moderate to severe segmental left ventricular systolic  dysfunction. The mid to distal septum, basal inferior,  distal inferior, distal segments, and the apex are  akinetic.  4. Moderate diastolic dysfunction(Stage II).  5. Mild riight ventricular enlargement with normal right  ventricular systolic function. A device wire is noted in  the right heart. There is thickening of the device wire  within the RV with mobile elements. Clinical correlation  advised.  *** Compared with echocardiogram of 8/19/2019, results are  similar on today's study. Thickening on the device wire  witin the RV with mobile elements was also seen on the  prior study.    < end of copied text >      CXR: b/l hermanff    Labs: Personally reviewed 11//20/19                        9.8    5.63  )-----------( 182      ( 17 Nov 2019 09:36 )             31.1     11-17    139  |  102  |  35<H>  ----------------------------<  102<H>  3.8   |  24  |  1.74<H>    Ca    8.9      17 Nov 2019 07:29      PT/INR - ( 17 Nov 2019 08:57 )   PT: 30.4 sec;   INR: 2.57 ratio         PTT - ( 16 Nov 2019 09:40 )  PTT:36.7 sec    CARDIAC MARKERS ( 15 Nov 2019 17:15 )  57 ng/L / x     / x     / x     / x     / x      CARDIAC MARKERS ( 15 Nov 2019 15:38 )  67 ng/L / x     / x     / x     / x     / x            Serum Pro-Brain Natriuretic Peptide: [id number] pg/mL (11-15 @ 15:38)              Assessment and Recommendation:   · Assessment		  89 M w/ prostate CA, 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    #ADHF  -Continue Lasix 80 IV BID - still diuresis to go   -Add metolazone 2.5mg BID 30 min prior to each Lasix dosing  -c/w coreg 6.25mg BID.   -increase hydralazine to 25 q8h for further afterload reduction  -Given elderly age and comorbidities, unlikely to benefit from upgrading to AICD    #Lead thrombus  -c/w coumadin    #Dispo  -Once discharged, needs heart failure follow up - he keeps getting re-admitted
Consult Follow Up Note:    Provider Specialty:  Cardiology.    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 Pt with recent admission for ADHF treated w/ IV diuresis and transitioned to oral bumex. He denies any chest pain, sob, or palpitations. Pt unable to offer much history at time of eval.     ===========================  Interval Events  - Improving  - Reports improved breathing  - Improving volume status    ===========================      PMHx:   Prostate cancer  CHF (congestive heart failure)  BPH (benign prostatic hyperplasia)  Hypertension    PFHx  Father HTN    PSHx:   History of pacemaker      Allergies:  No Known Allergies      Home Meds: Reviewed    Current Medications:   carvedilol 6.25 milliGRAM(s) Oral every 12 hours  furosemide   Injectable 80 milliGRAM(s) IV Push two times a day  influenza   Vaccine 0.5 milliLiter(s) IntraMuscular once  melatonin 3 milliGRAM(s) Oral at bedtime  QUEtiapine 12.5 milliGRAM(s) Oral every 12 hours PRN  warfarin 7.5 milliGRAM(s) Oral once          Social History:  Denies smoking, alcohol, or IVDU     REVIEW OF SYSTEMS:  CONSTITUTIONAL: No fevers or chills  EYES/ENT: No visual changes;  No dysphagia  NECK: No pain or stiffness  RESPIRATORY: +sob   CARDIOVASCULAR: No chest pain or palpitations; + b/l LE edema   GASTROINTESTINAL: No abdominal or epigastric pain. No nausea, vomiting, or hematemesis; No diarrhea or constipation. No melena or hematochezia.  BACK: No back pain  GENITOURINARY: No dysuria, frequency or hematuria  NEUROLOGICAL: No numbness or weakness  SKIN: No itching, burning, rashes, or lesions   All other review of systems is negative unless indicated above.    Physical Exam:  T(F): 97.6 (11-17), Max: 98.1 (11-16)  HR: 71 (11-17) (64 - 71)  BP: 135/70 (11-17) (116/70 - 135/70)  RR: 18 (11-17)  SpO2: 100% (11-17)  GENERAL: No acute distress  HEAD:  Atraumatic, Normocephalic  ENT: EOMI, PERRLA, conjunctiva and sclera clear  CHEST/LUNG: bibasilar crackles   BACK: No spinal tenderness  HEART: Regular rate and rhythm; No murmurs, rubs, or gallops  ABDOMEN: Soft, Nontender, Nondistended; Bowel sounds present  EXTREMITIES:  2+ b/l LE edema   PSYCH: Nl behavior, nl affect  NEUROLOGY: non-focal, cranial nerves intact  SKIN: Normal color, No rashes or lesions  LINES:    Cardiovascular Diagnostic Testing:    ECG: Personally reviewed: AV-paced    Echo: Personally reviewed:  < from: Transthoracic Echocardiogram (09.26.19 @ 09:16) >  Conclusions:  1. Mitral annular calcification. Tethered and thickened  mitral leaflets with normal diastolic opening. Mild mitral  regurgitation.  2. Calcified trileaflet aortic valve with normal opening.  No aortic valve regurgitation seen.  3. Moderate to severe segmental left ventricular systolic  dysfunction. The mid to distal septum, basal inferior,  distal inferior, distal segments, and the apex are  akinetic.  4. Moderate diastolic dysfunction(Stage II).  5. Mild riight ventricular enlargement with normal right  ventricular systolic function. A device wire is noted in  the right heart. There is thickening of the device wire  within the RV with mobile elements. Clinical correlation  advised.  *** Compared with echocardiogram of 8/19/2019, results are  similar on today's study. Thickening on the device wire  witin the RV with mobile elements was also seen on the  prior study.    < end of copied text >      CXR: b/l sheron    Labs: Personally reviewed 11//22/19                        9.8    5.63  )-----------( 182      ( 17 Nov 2019 09:36 )             31.1     11-17    139  |  102  |  35<H>  ----------------------------<  102<H>  3.8   |  24  |  1.74<H>    Ca    8.9      17 Nov 2019 07:29      PT/INR - ( 17 Nov 2019 08:57 )   PT: 30.4 sec;   INR: 2.57 ratio         PTT - ( 16 Nov 2019 09:40 )  PTT:36.7 sec    CARDIAC MARKERS ( 15 Nov 2019 17:15 )  57 ng/L / x     / x     / x     / x     / x      CARDIAC MARKERS ( 15 Nov 2019 15:38 )  67 ng/L / x     / x     / x     / x     / x            Serum Pro-Brain Natriuretic Peptide: [id number] pg/mL (11-15 @ 15:38)              Assessment and Recommendation:   · Assessment		  89 M w/ prostate CA, 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    #ADHF  -Patient would like to be discharged home  - Switch to Bumex 3 mg daily on discharge  -c/w coreg 6.25mg BID.   -He will need close f/u with PMD and HF to prevent another readmission    #Lead thrombus  -c/w coumadin    #Dispo  -Once discharged, needs heart failure follow up - he keeps getting re-admitted
Consult Follow Up Note:    Provider Specialty:  Cardiology.    Referral/Consultation:     · Requested by Name:	Dr. 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 Pt with recent admission for ADHF treated w/ IV diuresis and transitioned to oral bumex. He denies any chest pain, sob, or palpitations. Pt unable to offer much history at time of eval.     ===========================  Interval Events  - improving  - Reports improved breahthing      ===========================      PMHx:   Prostate cancer  CHF (congestive heart failure)  BPH (benign prostatic hyperplasia)  Hypertension    PFHx  Father HTN    PSHx:   History of pacemaker      Allergies:  No Known Allergies      Home Meds: Reviewed    Current Medications:   carvedilol 6.25 milliGRAM(s) Oral every 12 hours  furosemide   Injectable 80 milliGRAM(s) IV Push two times a day  influenza   Vaccine 0.5 milliLiter(s) IntraMuscular once  melatonin 3 milliGRAM(s) Oral at bedtime  QUEtiapine 12.5 milliGRAM(s) Oral every 12 hours PRN  warfarin 7.5 milliGRAM(s) Oral once          Social History:  Denies smoking, alcohol, or IVDU     REVIEW OF SYSTEMS:  CONSTITUTIONAL: No fevers or chills  EYES/ENT: No visual changes;  No dysphagia  NECK: No pain or stiffness  RESPIRATORY: +sob   CARDIOVASCULAR: No chest pain or palpitations; + b/l LE edema   GASTROINTESTINAL: No abdominal or epigastric pain. No nausea, vomiting, or hematemesis; No diarrhea or constipation. No melena or hematochezia.  BACK: No back pain  GENITOURINARY: No dysuria, frequency or hematuria  NEUROLOGICAL: No numbness or weakness  SKIN: No itching, burning, rashes, or lesions   All other review of systems is negative unless indicated above.    Physical Exam:  T(F): 97.6 (11-17), Max: 98.1 (11-16)  HR: 71 (11-17) (64 - 71)  BP: 135/70 (11-17) (116/70 - 135/70)  RR: 18 (11-17)  SpO2: 100% (11-17)  GENERAL: No acute distress  HEAD:  Atraumatic, Normocephalic  ENT: EOMI, PERRLA, conjunctiva and sclera clear  CHEST/LUNG: bibasilar crackles   BACK: No spinal tenderness  HEART: Regular rate and rhythm; No murmurs, rubs, or gallops  ABDOMEN: Soft, Nontender, Nondistended; Bowel sounds present  EXTREMITIES:  2+ b/l LE edema   PSYCH: Nl behavior, nl affect  NEUROLOGY: non-focal, cranial nerves intact  SKIN: Normal color, No rashes or lesions  LINES:    Cardiovascular Diagnostic Testing:    ECG: Personally reviewed: AV-paced    Echo: Personally reviewed:  < from: Transthoracic Echocardiogram (09.26.19 @ 09:16) >  Conclusions:  1. Mitral annular calcification. Tethered and thickened  mitral leaflets with normal diastolic opening. Mild mitral  regurgitation.  2. Calcified trileaflet aortic valve with normal opening.  No aortic valve regurgitation seen.  3. Moderate to severe segmental left ventricular systolic  dysfunction. The mid to distal septum, basal inferior,  distal inferior, distal segments, and the apex are  akinetic.  4. Moderate diastolic dysfunction(Stage II).  5. Mild riight ventricular enlargement with normal right  ventricular systolic function. A device wire is noted in  the right heart. There is thickening of the device wire  within the RV with mobile elements. Clinical correlation  advised.  *** Compared with echocardiogram of 8/19/2019, results are  similar on today's study. Thickening on the device wire  witin the RV with mobile elements was also seen on the  prior study.    < end of copied text >      CXR: b/l pleff    Labs: Personally reviewed 11//21/19                        9.8    5.63  )-----------( 182      ( 17 Nov 2019 09:36 )             31.1     11-17    139  |  102  |  35<H>  ----------------------------<  102<H>  3.8   |  24  |  1.74<H>    Ca    8.9      17 Nov 2019 07:29      PT/INR - ( 17 Nov 2019 08:57 )   PT: 30.4 sec;   INR: 2.57 ratio         PTT - ( 16 Nov 2019 09:40 )  PTT:36.7 sec    CARDIAC MARKERS ( 15 Nov 2019 17:15 )  57 ng/L / x     / x     / x     / x     / x      CARDIAC MARKERS ( 15 Nov 2019 15:38 )  67 ng/L / x     / x     / x     / x     / x            Serum Pro-Brain Natriuretic Peptide: [id number] pg/mL (11-15 @ 15:38)              Assessment and Recommendation:   · Assessment		  89 M w/ prostate CA, 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    #ADHF  -Re-assess in AM before dose of Metolazone or IV Lasix  -c/w coreg 6.25mg BID.   -increase hydralazine to 25 q8h for further afterload reduction  -Given elderly age and comorbidities, unlikely to benefit from upgrading to AICD    #Lead thrombus  -c/w coumadin    #Dispo  -Once discharged, needs heart failure follow up - he keeps getting re-admitted
Patient is a 89y old  Male who presents with a chief complaint of leg swelling (15 Nov 2019 19:02)      SUBJECTIVE / OVERNIGHT EVENTS:    Events noted.  CONSTITUTIONAL: No fever,  or fatigue  RESPIRATORY: No cough, wheezing,  No shortness of breath  CARDIOVASCULAR: No chest pain, palpitations, dizziness, or leg swelling  GASTROINTESTINAL: No abdominal or epigastric pain. No nausea, vomiting.  NEUROLOGICAL: No headaches,     MEDICATIONS  (STANDING):  carvedilol 6.25 milliGRAM(s) Oral every 12 hours  furosemide   Injectable 80 milliGRAM(s) IV Push two times a day  influenza   Vaccine 0.5 milliLiter(s) IntraMuscular once  melatonin 3 milliGRAM(s) Oral at bedtime    MEDICATIONS  (PRN):  QUEtiapine 12.5 milliGRAM(s) Oral every 12 hours PRN agitation        CAPILLARY BLOOD GLUCOSE        I&O's Summary    15 Nov 2019 07:01  -  16 Nov 2019 07:00  --------------------------------------------------------  IN: 480 mL / OUT: 975 mL / NET: -495 mL    16 Nov 2019 07:01  -  16 Nov 2019 16:11  --------------------------------------------------------  IN: 400 mL / OUT: 500 mL / NET: -100 mL        PHYSICAL EXAM:  GENERAL: NAD  NECK: Supple, No JVD  CHEST/LUNG: Clear to auscultation bilaterally; No wheezing.  HEART: Regular rate and rhythm; No murmurs, rubs, or gallops  ABDOMEN: Soft, Nontender, Nondistended; Bowel sounds present  EXTREMITIES:   No edema  NEUROLOGY: AAO X 3      LABS:                        10.3   5.39  )-----------( 203      ( 15 Nov 2019 15:38 )             33.2     11-16    137  |  101  |  42<H>  ----------------------------<  133<H>  4.0   |  21<L>  |  1.78<H>    Ca    8.8      16 Nov 2019 07:22  Mg     2.2     11-15    TPro  7.1  /  Alb  3.8  /  TBili  0.5  /  DBili  x   /  AST  22  /  ALT  12  /  AlkPhos  67  11-15    PT/INR - ( 16 Nov 2019 09:40 )   PT: 32.4 sec;   INR: 2.75 ratio         PTT - ( 16 Nov 2019 09:40 )  PTT:36.7 sec        CAPILLARY BLOOD GLUCOSE                    RADIOLOGY & ADDITIONAL TESTS:    Imaging Personally Reviewed:    Consultant(s) Notes Reviewed:      Care Discussed with Consultants/Other Providers:
Patient is a 89y old  Male who presents with a chief complaint of leg swelling (17 Nov 2019 17:55)      SUBJECTIVE / OVERNIGHT EVENTS:    Events noted. Leg swelling.  CONSTITUTIONAL: No fever,  or fatigue  RESPIRATORY: No cough, wheezing,  No shortness of breath  CARDIOVASCULAR: No chest pain, palpitations, dizziness,   GASTROINTESTINAL: No abdominal or epigastric pain.   NEUROLOGICAL: No headaches,     MEDICATIONS  (STANDING):  carvedilol 6.25 milliGRAM(s) Oral every 12 hours  furosemide   Injectable 80 milliGRAM(s) IV Push two times a day  influenza   Vaccine 0.5 milliLiter(s) IntraMuscular once  melatonin 3 milliGRAM(s) Oral at bedtime    MEDICATIONS  (PRN):  QUEtiapine 12.5 milliGRAM(s) Oral every 12 hours PRN agitation        CAPILLARY BLOOD GLUCOSE        I&O's Summary    16 Nov 2019 07:01  -  17 Nov 2019 07:00  --------------------------------------------------------  IN: 650 mL / OUT: 1000 mL / NET: -350 mL    17 Nov 2019 07:01  -  17 Nov 2019 23:00  --------------------------------------------------------  IN: 730 mL / OUT: 350 mL / NET: 380 mL        PHYSICAL EXAM:    NECK: Supple, No JVD  CHEST/LUNG: Clear to auscultation bilaterally; No wheezing.  HEART: Regular rate and rhythm; No murmurs, rubs, or gallops  ABDOMEN: Soft, Nontender, Nondistended; Bowel sounds present  EXTREMITIES:   Pedal edema edema  NEUROLOGY: AAO       LABS:                        9.8    5.63  )-----------( 182      ( 17 Nov 2019 09:36 )             31.1     11-17    139  |  102  |  35<H>  ----------------------------<  102<H>  3.8   |  24  |  1.74<H>    Ca    8.9      17 Nov 2019 07:29      PT/INR - ( 17 Nov 2019 08:57 )   PT: 30.4 sec;   INR: 2.57 ratio         PTT - ( 16 Nov 2019 09:40 )  PTT:36.7 sec        CAPILLARY BLOOD GLUCOSE                    RADIOLOGY & ADDITIONAL TESTS:    Imaging Personally Reviewed:    Consultant(s) Notes Reviewed:      Care Discussed with Consultants/Other Providers:
Patient is a 89y old  Male who presents with a chief complaint of leg swelling (17 Nov 2019 17:55)      SUBJECTIVE / OVERNIGHT EVENTS:    Events noted. Leg swelling.  CONSTITUTIONAL: No fever,  or fatigue  RESPIRATORY: No cough, wheezing,  No shortness of breath  CARDIOVASCULAR: No chest pain, palpitations, dizziness,   GASTROINTESTINAL: No abdominal or epigastric pain.   NEUROLOGICAL: No headaches,     MEDICATIONS  (STANDING):  carvedilol 6.25 milliGRAM(s) Oral every 12 hours  furosemide   Injectable 80 milliGRAM(s) IV Push two times a day  influenza   Vaccine 0.5 milliLiter(s) IntraMuscular once  melatonin 3 milliGRAM(s) Oral at bedtime    MEDICATIONS  (PRN):  QUEtiapine 12.5 milliGRAM(s) Oral every 12 hours PRN agitation        CAPILLARY BLOOD GLUCOSE        I&O's Summary    16 Nov 2019 07:01  -  17 Nov 2019 07:00  --------------------------------------------------------  IN: 650 mL / OUT: 1000 mL / NET: -350 mL    17 Nov 2019 07:01  -  17 Nov 2019 23:00  --------------------------------------------------------  IN: 730 mL / OUT: 350 mL / NET: 380 mL        PHYSICAL EXAM:    NECK: Supple, No JVD  CHEST/LUNG: Clear to auscultation bilaterally; No wheezing.  HEART: Regular rate and rhythm; No murmurs, rubs, or gallops  ABDOMEN: Soft, Nontender, Nondistended; Bowel sounds present  EXTREMITIES:   Pedal edema edema  NEUROLOGY: AAO X 3      LABS:                        9.8    5.63  )-----------( 182      ( 17 Nov 2019 09:36 )             31.1     11-17    139  |  102  |  35<H>  ----------------------------<  102<H>  3.8   |  24  |  1.74<H>    Ca    8.9      17 Nov 2019 07:29      PT/INR - ( 17 Nov 2019 08:57 )   PT: 30.4 sec;   INR: 2.57 ratio         PTT - ( 16 Nov 2019 09:40 )  PTT:36.7 sec        CAPILLARY BLOOD GLUCOSE                    RADIOLOGY & ADDITIONAL TESTS:    Imaging Personally Reviewed:    Consultant(s) Notes Reviewed:      Care Discussed with Consultants/Other Providers:
Patient is a 89y old  Male who presents with a chief complaint of leg swelling (19 Nov 2019 22:37)      SUBJECTIVE / OVERNIGHT EVENTS:    Events noted.  CONSTITUTIONAL: No fever,  or fatigue  RESPIRATORY: Mild SOB  CARDIOVASCULAR: No chest pain, palpitations, dizziness, or leg swelling  GASTROINTESTINAL: No abdominal or epigastric pain.   NEUROLOGICAL: No headaches,     MEDICATIONS  (STANDING):  carvedilol 6.25 milliGRAM(s) Oral every 12 hours  furosemide   Injectable 80 milliGRAM(s) IV Push two times a day  hydrALAZINE 10 milliGRAM(s) Oral every 12 hours  influenza   Vaccine 0.5 milliLiter(s) IntraMuscular once  melatonin 3 milliGRAM(s) Oral at bedtime    MEDICATIONS  (PRN):  QUEtiapine 12.5 milliGRAM(s) Oral every 12 hours PRN agitation        CAPILLARY BLOOD GLUCOSE        I&O's Summary    18 Nov 2019 07:01  -  19 Nov 2019 07:00  --------------------------------------------------------  IN: 1040 mL / OUT: 2000 mL / NET: -960 mL    19 Nov 2019 07:01  -  20 Nov 2019 00:26  --------------------------------------------------------  IN: 860 mL / OUT: 1750 mL / NET: -890 mL        PHYSICAL EXAM:  GENERAL: NAD  NECK: Supple, No JVD  CHEST/LUNG: Clear to auscultation bilaterally; BL basilar crackles  HEART: Regular rate and rhythm; No murmurs, rubs, or gallops  ABDOMEN: Soft, Nontender, Nondistended; Bowel sounds present  EXTREMITIES:   No edema  NEUROLOGY: AAO       LABS:                        10.0   5.77  )-----------( 192      ( 18 Nov 2019 08:44 )             32.3     11-19    136  |  99  |  30<H>  ----------------------------<  106<H>  3.5   |  27  |  1.45<H>    Ca    8.6      19 Nov 2019 07:23      PT/INR - ( 19 Nov 2019 08:49 )   PT: 29.1 sec;   INR: 2.46 ratio                 CAPILLARY BLOOD GLUCOSE                    RADIOLOGY & ADDITIONAL TESTS:    Imaging Personally Reviewed:    Consultant(s) Notes Reviewed:      Care Discussed with Consultants/Other Providers:
Patient is a 89y old  Male who presents with a chief complaint of leg swelling (19 Nov 2019 22:37)      SUBJECTIVE / OVERNIGHT EVENTS:    Events noted.  CONSTITUTIONAL: No fever,  or fatigue  RESPIRATORY: Mild SOB  CARDIOVASCULAR: No chest pain, palpitations, dizziness, or leg swelling  GASTROINTESTINAL: No abdominal or epigastric pain.   NEUROLOGICAL: No headaches,     MEDICATIONS  (STANDING):  carvedilol 6.25 milliGRAM(s) Oral every 12 hours  furosemide   Injectable 80 milliGRAM(s) IV Push two times a day  hydrALAZINE 10 milliGRAM(s) Oral every 12 hours  influenza   Vaccine 0.5 milliLiter(s) IntraMuscular once  melatonin 3 milliGRAM(s) Oral at bedtime    MEDICATIONS  (PRN):  QUEtiapine 12.5 milliGRAM(s) Oral every 12 hours PRN agitation        CAPILLARY BLOOD GLUCOSE        I&O's Summary    18 Nov 2019 07:01  -  19 Nov 2019 07:00  --------------------------------------------------------  IN: 1040 mL / OUT: 2000 mL / NET: -960 mL    19 Nov 2019 07:01  -  20 Nov 2019 00:26  --------------------------------------------------------  IN: 860 mL / OUT: 1750 mL / NET: -890 mL        PHYSICAL EXAM:  GENERAL: NAD  NECK: Supple, No JVD  CHEST/LUNG: Clear to auscultation bilaterally; BL basilar crackles  HEART: Regular rate and rhythm; No murmurs, rubs, or gallops  ABDOMEN: Soft, Nontender, Nondistended; Bowel sounds present  EXTREMITIES:   No edema  NEUROLOGY: AAO       LABS:                        10.0   5.77  )-----------( 192      ( 18 Nov 2019 08:44 )             32.3     11-19    136  |  99  |  30<H>  ----------------------------<  106<H>  3.5   |  27  |  1.45<H>    Ca    8.6      19 Nov 2019 07:23      PT/INR - ( 19 Nov 2019 08:49 )   PT: 29.1 sec;   INR: 2.46 ratio                 CAPILLARY BLOOD GLUCOSE                    RADIOLOGY & ADDITIONAL TESTS:    Imaging Personally Reviewed:    Consultant(s) Notes Reviewed:      Care Discussed with Consultants/Other Providers:
Patient is a 89y old  Male who presents with a chief complaint of leg swelling (19 Nov 2019 22:37)      SUBJECTIVE / OVERNIGHT EVENTS:  Mild edema in feet,   no new Events noted.  CONSTITUTIONAL: No fever,  or fatigue  RESPIRATORY: Mild SOB  CARDIOVASCULAR: No chest pain, palpitations, dizziness, or leg swelling  GASTROINTESTINAL: No abdominal or epigastric pain.   NEUROLOGICAL: No headaches,     MEDICATIONS  (STANDING):  carvedilol 6.25 milliGRAM(s) Oral every 12 hours  furosemide   Injectable 80 milliGRAM(s) IV Push two times a day  hydrALAZINE 10 milliGRAM(s) Oral every 12 hours  influenza   Vaccine 0.5 milliLiter(s) IntraMuscular once  melatonin 3 milliGRAM(s) Oral at bedtime    MEDICATIONS  (PRN):  QUEtiapine 12.5 milliGRAM(s) Oral every 12 hours PRN agitation        CAPILLARY BLOOD GLUCOSE        I&O's Summary    18 Nov 2019 07:01  -  19 Nov 2019 07:00  --------------------------------------------------------  IN: 1040 mL / OUT: 2000 mL / NET: -960 mL    19 Nov 2019 07:01  -  20 Nov 2019 00:26  --------------------------------------------------------  IN: 860 mL / OUT: 1750 mL / NET: -890 mL        PHYSICAL EXAM:  GENERAL: NAD  NECK: Supple, No JVD  CHEST/LUNG: Clear to auscultation bilaterally; BL basilar crackles  HEART: Regular rate and rhythm; No murmurs, rubs, or gallops  ABDOMEN: Soft, Nontender, Nondistended; Bowel sounds present  EXTREMITIES:   No edema  NEUROLOGY: AAO       LABS:                        10.0   5.77  )-----------( 192      ( 18 Nov 2019 08:44 )             32.3     11-19    136  |  99  |  30<H>  ----------------------------<  106<H>  3.5   |  27  |  1.45<H>    Ca    8.6      19 Nov 2019 07:23      PT/INR - ( 19 Nov 2019 08:49 )   PT: 29.1 sec;   INR: 2.46 ratio                 CAPILLARY BLOOD GLUCOSE                    RADIOLOGY & ADDITIONAL TESTS:    Imaging Personally Reviewed:    Consultant(s) Notes Reviewed:      Care Discussed with Consultants/Other Providers:
Patient is a 89y old  Male who presents with a chief complaint of leg swelling (19 Nov 2019 22:37)      SUBJECTIVE / OVERNIGHT EVENTS:  Mild edema in feet, no new Events noted.  CONSTITUTIONAL: No fever,  or fatigue  RESPIRATORY: Mild SOB  CARDIOVASCULAR: No chest pain, palpitations, dizziness, or leg swelling  GASTROINTESTINAL: No abdominal or epigastric pain.   NEUROLOGICAL: No headaches,     MEDICATIONS  (STANDING):  carvedilol 6.25 milliGRAM(s) Oral every 12 hours  furosemide   Injectable 80 milliGRAM(s) IV Push two times a day  hydrALAZINE 10 milliGRAM(s) Oral every 12 hours  influenza   Vaccine 0.5 milliLiter(s) IntraMuscular once  melatonin 3 milliGRAM(s) Oral at bedtime    MEDICATIONS  (PRN):  QUEtiapine 12.5 milliGRAM(s) Oral every 12 hours PRN agitation        CAPILLARY BLOOD GLUCOSE        I&O's Summary    18 Nov 2019 07:01  -  19 Nov 2019 07:00  --------------------------------------------------------  IN: 1040 mL / OUT: 2000 mL / NET: -960 mL    19 Nov 2019 07:01  -  20 Nov 2019 00:26  --------------------------------------------------------  IN: 860 mL / OUT: 1750 mL / NET: -890 mL        PHYSICAL EXAM:  GENERAL: NAD  NECK: Supple, No JVD  CHEST/LUNG: Clear to auscultation bilaterally; BL basilar crackles  HEART: Regular rate and rhythm; No murmurs, rubs, or gallops  ABDOMEN: Soft, Nontender, Nondistended; Bowel sounds present  EXTREMITIES:   No edema  NEUROLOGY: AAO       LABS:                        10.0   5.77  )-----------( 192      ( 18 Nov 2019 08:44 )             32.3     11-19    136  |  99  |  30<H>  ----------------------------<  106<H>  3.5   |  27  |  1.45<H>    Ca    8.6      19 Nov 2019 07:23      PT/INR - ( 19 Nov 2019 08:49 )   PT: 29.1 sec;   INR: 2.46 ratio                 CAPILLARY BLOOD GLUCOSE                    RADIOLOGY & ADDITIONAL TESTS:    Imaging Personally Reviewed:    Consultant(s) Notes Reviewed:      Care Discussed with Consultants/Other Providers:
Consult Follow Up Note:    Provider Specialty:  Cardiology.    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 Pt with recent admission for ADHF treated w/ IV diuresis and transitioned to oral bumex. He denies any chest pain, sob, or palpitations. Pt unable to offer much history at time of eval.     ===========================  Interval Events  - Reports improved breahthing  - Still with crackles and LE edema    ===========================      PMHx:   Prostate cancer  CHF (congestive heart failure)  BPH (benign prostatic hyperplasia)  Hypertension    PFHx  Father HTN    PSHx:   History of pacemaker      Allergies:  No Known Allergies      Home Meds: Reviewed    Current Medications:   carvedilol 6.25 milliGRAM(s) Oral every 12 hours  furosemide   Injectable 80 milliGRAM(s) IV Push two times a day  influenza   Vaccine 0.5 milliLiter(s) IntraMuscular once  melatonin 3 milliGRAM(s) Oral at bedtime  QUEtiapine 12.5 milliGRAM(s) Oral every 12 hours PRN  warfarin 7.5 milliGRAM(s) Oral once          Social History:  Denies smoking, alcohol, or IVDU     REVIEW OF SYSTEMS:  CONSTITUTIONAL: No fevers or chills  EYES/ENT: No visual changes;  No dysphagia  NECK: No pain or stiffness  RESPIRATORY: +sob   CARDIOVASCULAR: No chest pain or palpitations; + b/l LE edema   GASTROINTESTINAL: No abdominal or epigastric pain. No nausea, vomiting, or hematemesis; No diarrhea or constipation. No melena or hematochezia.  BACK: No back pain  GENITOURINARY: No dysuria, frequency or hematuria  NEUROLOGICAL: No numbness or weakness  SKIN: No itching, burning, rashes, or lesions   All other review of systems is negative unless indicated above.    Physical Exam:  T(F): 97.6 (11-17), Max: 98.1 (11-16)  HR: 71 (11-17) (64 - 71)  BP: 135/70 (11-17) (116/70 - 135/70)  RR: 18 (11-17)  SpO2: 100% (11-17)  GENERAL: No acute distress  HEAD:  Atraumatic, Normocephalic  ENT: EOMI, PERRLA, conjunctiva and sclera clear  CHEST/LUNG: bibasilar crackles   BACK: No spinal tenderness  HEART: Regular rate and rhythm; No murmurs, rubs, or gallops  ABDOMEN: Soft, Nontender, Nondistended; Bowel sounds present  EXTREMITIES:  2+ b/l LE edema   PSYCH: Nl behavior, nl affect  NEUROLOGY: non-focal, cranial nerves intact  SKIN: Normal color, No rashes or lesions  LINES:    Cardiovascular Diagnostic Testing:    ECG: Personally reviewed: AV-paced    Echo: Personally reviewed:  < from: Transthoracic Echocardiogram (09.26.19 @ 09:16) >  Conclusions:  1. Mitral annular calcification. Tethered and thickened  mitral leaflets with normal diastolic opening. Mild mitral  regurgitation.  2. Calcified trileaflet aortic valve with normal opening.  No aortic valve regurgitation seen.  3. Moderate to severe segmental left ventricular systolic  dysfunction. The mid to distal septum, basal inferior,  distal inferior, distal segments, and the apex are  akinetic.  4. Moderate diastolic dysfunction(Stage II).  5. Mild riight ventricular enlargement with normal right  ventricular systolic function. A device wire is noted in  the right heart. There is thickening of the device wire  within the RV with mobile elements. Clinical correlation  advised.  *** Compared with echocardiogram of 8/19/2019, results are  similar on today's study. Thickening on the device wire  witin the RV with mobile elements was also seen on the  prior study.    < end of copied text >      CXR: b/l pleff    Labs: Personally reviewed 11//19/19                        9.8    5.63  )-----------( 182      ( 17 Nov 2019 09:36 )             31.1     11-17    139  |  102  |  35<H>  ----------------------------<  102<H>  3.8   |  24  |  1.74<H>    Ca    8.9      17 Nov 2019 07:29      PT/INR - ( 17 Nov 2019 08:57 )   PT: 30.4 sec;   INR: 2.57 ratio         PTT - ( 16 Nov 2019 09:40 )  PTT:36.7 sec    CARDIAC MARKERS ( 15 Nov 2019 17:15 )  57 ng/L / x     / x     / x     / x     / x      CARDIAC MARKERS ( 15 Nov 2019 15:38 )  67 ng/L / x     / x     / x     / x     / x            Serum Pro-Brain Natriuretic Peptide: 40360 pg/mL (11-15 @ 15:38)              Assessment and Recommendation:   · Assessment		  89 M w/ prostate CA, 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    #ADHF  -Continue Lasix 80 IV BID - still diuresis to go   -c/w coreg 6.25mg BID.   -Would start pt on hydralazine 10 q8h for further afterload reduction  -Given elderly age and comorbidities, unlikely to benefit from upgrading to AICD    #Lead thrombus  -c/w coumadin    #Dispo  -Once discharged, needs heart failure follow up - he keeps getting re-admitted

## 2019-11-22 NOTE — CHART NOTE - NSCHARTNOTEFT_GEN_A_CORE
follow up- pt is cleared by md for discharge home with home care; d/w Dr. Lobato cardiologist and advised to continue bumetanide 3mg po daily and coreg 6.25mg po twice daily on discharge; continue with coumadin and dose as per INR; pt has appointment with PMD on 11/27/19per d/w pt sonAlexandre; discussed discharge medication/follow up,  Marissa Whitt(NP)  [facility], [phone]

## 2019-11-22 NOTE — PROGRESS NOTE ADULT - PROVIDER SPECIALTY LIST ADULT
Cardiology Consent: Written consent was obtained and risks were reviewed including but not limited to scarring, infection, bleeding, scabbing, incomplete removal, nerve damage and allergy to anesthesia.

## 2019-11-22 NOTE — DISCHARGE NOTE NURSING/CASE MANAGEMENT/SOCIAL WORK - PATIENT PORTAL LINK FT
You can access the FollowMyHealth Patient Portal offered by [facility] by registering at the following website: [email or web address]. By joining [facility]’s FollowMyHealth portal, you will also be able to view your health information using other applications (apps) compatible with our system.

## 2019-11-22 NOTE — DISCHARGE NOTE NURSING/CASE MANAGEMENT/SOCIAL WORK - NSDCPEPTCOWAR_GEN_ALL_CORE
Warfarin/Coumadin - Dietary Advice/Warfarin/Coumadin - Follow up monitoring/Warfarin/Coumadin - Potential for adverse drug reactions and interactions/Warfarin/Coumadin - Compliance

## 2020-03-25 NOTE — PATIENT PROFILE ADULT - NSASFUNCLEVELADLTRANSFER_GEN_A_NUR
Chart review completed  Patient's daughter requested discharge from [address] [place] [address] services 3/15/20 due to [address]  Unsuccessful attempt at reaching patient  Left name, department call back number and message indicating patient should call his PCP for any emergent needs  CM also indicated that dept VM is operational M-F between 8a-430p  This CM will continue to monitor electronically [facility] chart review [facility]  1 = assistive equipment

## 2020-09-12 NOTE — PROGRESS NOTE ADULT - PROBLEM/PLAN-1
Syncope   due to severe Orthostatic hypotension   cont midodrine  will consider adding clonidine   monitor orthostatic vitals every 8 hours   OOB to chair at all times  Compression stockings  neurology eval noted ho    HTN  supine HTN  will consider adding clonidine     Elevated trop  not consistent with acute MI   obtain echo DISPLAY PLAN FREE TEXT

## 2020-09-29 NOTE — ED PROVIDER NOTE - ATTENDING CONTRIBUTION TO CARE
\89M, pmh chf, htn, presents with sob, productive cough x a few days. Pt recently returned from [place], was previously on lasix but has not taken x several mo. SOB worse with ambulation, lying flat. +bilat LE edema. +Constipation, abd distension, but denies pain. Denies cp, palpitaitons, f/c, n/v/d.    PE: NAD, NCAT, MMM, Trachea midline, Normal conjunctiva, lungs with bibasilar rales, S1/S2 RRR, Normal perfusion, 2+ radial pulses bilat, Abdomen Soft, NTND, No rebound/guarding, +bilat pitting LE edema, No deformity of extremities, No rashes,  No focal motor or sensory deficits.     Presentation most c/w chf exacerbation. Without cp, low suspicion of acs, ekg without acute ischemic changes. Without unilateral leg swelling, no hypoxia, very low suspicion of PE. Obtian labs, cxr, diurese, and plan to admit. - Vic Low MD Alert and oriented to person, place and time

## 2020-10-30 NOTE — DISCHARGE NOTE PROVIDER - PROVIDER RX CONTACT NUMBER
I left message to call me at [facility] also stated I will be at my home site on Monday 11/02.   
[phone]

## 2022-04-06 NOTE — ED PROVIDER NOTE - GASTROINTESTINAL, MLM
It was nice to meet you today! I am sorry that you are hurting.     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     I think the numbness/tingling/pain in hand is from carpal tunnel.     Wear the wrist braces at night. You can wear during the day as needed.     If no improvement, we can get a nerve test/EMG to look into this further.     We will call you in 4 weeks to check on you, but please stay in touch and call me if you need anything. You can also send me a message in [facility].     Meera   [phone]   
Abdomen soft, non-tender, no guarding.

## 2023-03-14 NOTE — ED ADULT NURSE NOTE - CAS TRG GENERAL AIRWAY, MLM
Intubation    Date/Time: 3/14/2023 2:04 PM  Performed by: Roberto Carlos Engle CRNA  Authorized by: Marga Borrero MD     Intubation:     Induction:  Rapid sequence induction    Intubated:  Postinduction    Mask Ventilation:  Easy mask    Attempts:  1    Attempted By:  CRNA    Method of Intubation:  Video laryngoscopy    Blade:  Chua 4    Laryngeal View Grade: Grade I - full view of cords      Difficult Airway Encountered?: No      Complications:  None    Airway Device:  Oral endotracheal tube    Airway Device Size:  7.5    Style/Cuff Inflation:  Cuffed (inflated to minimal occlusive pressure)    Tube secured:  23    Secured at:  The lips    Placement Verified By:  Capnometry and Colorimetric ETCO2 device    Complicating Factors:  None    Findings Post-Intubation:  BS equal bilateral and atraumatic/condition of teeth unchanged    
Peripheral Block    Patient location during procedure: pre-op   Block not for primary anesthetic.  Reason for block: at surgeon's request and post-op pain management   Post-op Pain Location: R shoulder   Start time: 3/14/2023 1:15 PM  Timeout: 3/14/2023 1:08 PM   End time: 3/14/2023 1:20 PM    Staffing  Authorizing Provider: Marga Borrero MD  Performing Provider: Marga Borrero MD    Preanesthetic Checklist  Completed: patient identified, IV checked, site marked, risks and benefits discussed, surgical consent, monitors and equipment checked, pre-op evaluation and timeout performed  Peripheral Block  Patient position: sitting  Prep: ChloraPrep  Patient monitoring: heart rate, cardiac monitor, continuous pulse ox, continuous capnometry and frequent blood pressure checks  Block type: interscalene  Laterality: right  Injection technique: single shot  Needle  Needle type: Stimuplex   Needle gauge: 22 G  Needle length: 2 in  Needle localization: anatomical landmarks and ultrasound guidance   -ultrasound image captured on disc.  Assessment  Injection assessment: negative aspiration, negative parasthesia and local visualized surrounding nerve  Paresthesia pain: none  Heart rate change: no  Slow fractionated injection: yes    Medications:    Medications: ropivacaine (NAROPIN) injection 0.5% - Perineural   20 mL - 3/14/2023 1:20:00 PM    Additional Notes  VSS.  VItals monitored throughout procedure.  Patient tolerated procedure well.             
Patent

## 2024-02-09 NOTE — PHYSICAL THERAPY INITIAL EVALUATION ADULT - PLANNED THERAPY INTERVENTIONS, PT EVAL
Orthopedic
transfer training/bed mobility training/balance training/GOAL: Pt will perform 10 stairs with or without U HR as needed within 3-4weeks./gait training

## 2024-08-11 NOTE — H&P ADULT - I WAS PHYSICALLY PRESENT FOR THE KEY PORTIONS OF THE EVALUATION AND MANAGEMENT (E/M) SERVICE PROVIDED.  I AGREE WITH THE ABOVE HISTORY, PHYSICAL, AND PLAN WHICH I HAVE REVIEWED AND EDITED WHERE APPROPRIATE
Problem: Adult Inpatient Plan of Care  Goal: Plan of Care Review  Outcome: Progressing  Goal: Patient-Specific Goal (Individualized)  Outcome: Progressing  Goal: Absence of Hospital-Acquired Illness or Injury  Outcome: Progressing  Goal: Optimal Comfort and Wellbeing  Outcome: Progressing     Problem: Skin Injury Risk Increased  Goal: Skin Health and Integrity  Outcome: Progressing      Statement Selected

## 2024-08-15 NOTE — PATIENT PROFILE ADULT - ABILITY TO HEAR (WITH HEARING AID OR HEARING APPLIANCE IF NORMALLY USED):
Adequate: hears normal conversation without difficulty [de-identified] : 73yo M here for f/u of CLL. He was previously following with Dr. Mccabe. \par  \par  On March 2,2017, total WBC 11,33, with absolute lymphocyte count 5865.\par  On May 25, Total WBC 14.3, ALC 9052.\par  Of note, chest CT for follow up of renal cell carcinoma on 5/31 showed slightly enlarged subpectoral and left axillary nodes.\par  MRI/PET 6/1 multiple stable retroperitoneal nodes without abnormal FDG uptake. There was mild splenomegaly without abnormal uptake.\par  \par  Flow cytometry reveals monotypic B cells positive for CD19 didn't CD20 CD23 and C5 negative for FMC-7, CD10 and CD38 consistent with CLL. The monotypic B cells are 41% of cells, equating to 5658 cells\par  FISH panel reveals deletion of 11q in 57.5% of cells and deletion of 13q in 67% of cells.\par  \par  He has had no constitutional symptoms.\par  He has a history of prostate carcinoma, 3 cm renal cell carcinoma, 3.5 cm medullary thyroid cancer\par  \par  His previous CA history is as follows: \par  In 2003, he was diagnosed with prostate CA s/p radical prostatectomy\par  MRI done for rising PSA in 2009, found to have RCC s/p partial R nephrectomy and RT to prostate bed\par  PET scheduled on Monday for rising PSA\par  In 8/2018, found to have medullary thyroid CA s/p total thyroidectomy\par  \par  PET MRI Axumin skullbase to thighs: s/p radical prostatectomy. No recurrence in the prostatectomy bed. No metastatic lymphadenopathy of prostate origin. No liver or lung metastasis.\par  Interval increase in size of the abdominal, retroperitoneal and mesenteric lymphadenopathy with correcting increase Axumin uptake since 6/10/19,. No liver infiltration. Splenomegaly (14.6cm), relatively stable since 6/10/19.\par  s/p R partial nephrectomy without evidence of local recurrence.\par  s/p thyroidectomy. No recurrent mass in the thyroidectomy bed [de-identified] : Labs done on 9/16/20 - WBC 78.9. Repeat on 10/6/20 was 73.79.  He went to [place] for the winter. Labs done in [place] on 5/3/21 when he had cellulitis showed WBC of 143.4.   He got back from [place] on 5/20. Cellulitis flared up again about 4 days later. He went to see his PMD and received a course of Cefadoxil x10d started 5/27. He had shingles on L side in July, around 7/12. Has LUQ pain, ? postherpetic neuralgia, on gabapentin.  He had his COVID booster 9/9 Abd sono done 9/2022: Extensive mesenteric adenopathy measuring up to 11 cm in size. Splenomegaly 16.8 by 8.7 x 12.3 cm.  He went to [place] for the winter. He was admitted for cellulitis - was discharged on IV Abx. Also had a rectal bleed from internal hemorrhoids Saw Dr. Jones - .88 in 4/2022. Hgb 9.0, plts 102 He was started on iron supplements for ferritin of 26. He had COVID 5/12 s/p monoclonal antibodies but still required hospitalization. He received Abx and Remdesivir, was discharged last week.  He has lost about 40 lbs after these admissions for infections.  IgG levels drawn in June was 295.   He started Calquence 100mg BID on 7/7, tolerating it well. He is bruising easily when there's trauma. He has increased joint pains at night; pain not too bad during the daytime.  We started IVIG on 7/14/22 Went to [place] 7/23-8/6. 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 Xray L femur scheduled for tomorrow   9/8: Patient was seen today accompanied by his wife, he is very depressed with his metastatic prostate cancer and CLL. He stated his f diffused pain in left knee, back lower back, Tylenol for and tramadol with mild improvement. Saw Ortho  on 8/18 for left knee pain 2/2 OR, had Monovisc injection into his left knee.  He f/u with [facility] oncologist for metastatic prostate cancer, Femur Xray done at [facility] on 8/12/22 show sclerosis of the proximal femur but no lytic lesions. Pt had PET/CT done on 8/30. He was started on med for prostate cancer added 1 week ago per pt, he didn't remember the name. Denied any usage of any steroid recently.   10/6: patient was seen today in the [facility] with Dr. Mcmahon. Pt c/o constipation/hemorrhoid bleeding due to s/e of oral iron supplement. He will fly to [place] in 1 wk and will come back in May, will f/u w/ Dr. Jones while in [place] Will have COVID booster tomorrow night.  PSA level 90% decreased now He c/o shoulder, back and knee pain, took tylenol/ tramadol w/o significant improvement. Could not sleep during night due to the pain. rated it 6-7/10 at night.   6/7: He just got back from [place] a week or 2 ago. He was seeing Dr. Jones down in [place]. Last labs with him in April were: WBC 65.09, Hgb 10.8, plt 105.  He received Feraheme in Nov 2022: 11/18 and 11/25. He had labs with his PMD Dr. Doran on 5/31: Fe 37, TIBC 281, TSAT 13%, ferritin 50. Pt reports feeling very fatigued.  Getting IVIG today. He continues on Calquence.    Pt had COVID end of June. He continues to have fatigue and weakness. Also with diffuse joint pains.   9/7:  Patient is seen today for a f/u apt accompanied by his wife. He feels tired recently, saw cardiologist and had a cardiac monitor, was found pulse paused last Thursday night. He had a pacemaker placed last Friday 9/1/23. Still have bruises on his left chest from the procedure.   10/9: Still has some fatigue but not like before. He will be going to [place] end of the month.   5/22/24: Pt returned to [place] from [place] yesterday.  He was admitted to [facility] on 4/23/24 for 5 dys for GIB and severe anemia, s/p 3U PRBC transfusion. He was seeing hematologist Dr. Jones when he was in [place]. His platelet dropped on 5/7/2024 to 40K,  he got 1U PLT on 5/9, plate rechecked on 5/14 55k. Dexa 40mg x 4 days completed on 5/18. Calquence was hold since 4/18/24 when he was hospitalized. Xarelto was hold since platelet trended down.  He is still on Orgovyx for prostate;   6/3/24:  Patient is seen today for a f/u apt accompanied by his wife. s/p Fereheme x 2 on 5/25/24 and 6/1/24. He c/o headache and fatigue after fereheme, took tramadol/Tylenol for the pain relieve. Still c/o headache today.  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 Synthroid dose increased to 175mcg and ramipril dose decreased. Notes very low energy.   7/17/24:  Patient is seen today for a f/u apt accompanied by his son Tanner. He denied any active bleeding. Platelet today 65K.  7/25/24: Pt felt frustrated today when saw platelet trending down to 43K, He will fly to [place] 7/29-8/12/24.    8/14/24: Patient stopped taking xarelto in April 2024 when he had a GIB from diverticulosis. He stopped calquence at that time. After that, he had a brief episode of a fib in April 2024 but he was already off both xarelto and calquence. He has a plan for Watchman procedure in October 2024. He's been having sweats and tiredness.

## 2025-07-31 NOTE — PROVIDER CONTACT NOTE (CRITICAL VALUE NOTIFICATION) - SITUATION
PT is on a heparin infusion, with APTT drawn and resulted as critical
critical ptt result 185
troponin high sensitivity 88
Render In Strict Bullet Format?: No
Continue Regimen: ketoconazole 2 % shampoo and fluocinonide 0.05 % topical solution
Detail Level: Zone